# Patient Record
Sex: MALE | Race: WHITE | NOT HISPANIC OR LATINO | Employment: OTHER | URBAN - NONMETROPOLITAN AREA
[De-identification: names, ages, dates, MRNs, and addresses within clinical notes are randomized per-mention and may not be internally consistent; named-entity substitution may affect disease eponyms.]

---

## 2017-01-03 ENCOUNTER — HOSPITAL ENCOUNTER (OUTPATIENT)
Dept: OTHER | Facility: HOSPITAL | Age: 67
Discharge: HOME | End: 2017-01-03
Attending: INTERNAL MEDICINE | Admitting: INTERNAL MEDICINE

## 2017-01-09 ENCOUNTER — OFFICE VISIT (OUTPATIENT)
Dept: CARDIOLOGY | Facility: CLINIC | Age: 67
End: 2017-01-09

## 2017-01-09 VITALS
SYSTOLIC BLOOD PRESSURE: 138 MMHG | HEIGHT: 75 IN | OXYGEN SATURATION: 97 % | HEART RATE: 64 BPM | DIASTOLIC BLOOD PRESSURE: 68 MMHG | WEIGHT: 267.9 LBS | BODY MASS INDEX: 33.31 KG/M2

## 2017-01-09 DIAGNOSIS — I10 ESSENTIAL HYPERTENSION: Primary | ICD-10-CM

## 2017-01-09 DIAGNOSIS — I25.10 CORONARY ARTERY DISEASE INVOLVING NATIVE CORONARY ARTERY OF NATIVE HEART WITHOUT ANGINA PECTORIS: ICD-10-CM

## 2017-01-09 PROCEDURE — 99213 OFFICE O/P EST LOW 20 MIN: CPT | Performed by: INTERNAL MEDICINE

## 2017-01-09 RX ORDER — CHLORAL HYDRATE 500 MG
1200 CAPSULE ORAL
COMMUNITY

## 2017-01-09 RX ORDER — CHOLECALCIFEROL (VITAMIN D3) 10 MCG
1 TABLET ORAL DAILY
COMMUNITY

## 2017-01-09 NOTE — MR AVS SNAPSHOT
Robinson Quiroz   1/9/2017 12:45 PM   Office Visit    Dept Phone:  [phone]   Encounter #:  [id number]    Provider:  Arnaud Akbar MD   Department:  [facility] CARDIOLOGY                Your Full Care Plan              Your Updated Medication List          This list is accurate as of: 1/9/17  1:30 PM.  Always use your most recent med list.                albuterol 108 (90 BASE) MCG/ACT inhaler   Commonly known as:  PROVENTIL HFA;VENTOLIN HFA   Inhale 2 puffs every 4 (four) hours as needed (cough).       aspirin 81 MG chewable tablet       atorvastatin 80 MG tablet   Commonly known as:  LIPITOR       b complex-C-folic acid 1 MG capsule       clotrimazole-betamethasone 1-0.05 % cream   Commonly known as:  LOTRISONE       fish oil 1000 MG capsule capsule       gabapentin 300 MG capsule   Commonly known as:  NEURONTIN       Lorcaserin HCl 10 MG tablet   Take 10 mg by mouth 2 (Two) Times a Day.       metoprolol succinate XL 50 MG 24 hr tablet   Commonly known as:  TOPROL-XL   TAKE 1 TABLET BY MOUTH EVERY DAY FOR HEART/BLOOD PRESSURE       MULTIVITAMIN ADULTS 50+ PO       pregabalin 50 MG capsule   Commonly known as:  LYRICA       tamsulosin 0.4 MG capsule 24 hr capsule   Commonly known as:  FLOMAX   TAKE ONE CAPSULE BY MOUTH EVERY DAY FOR PROSTATE       triamcinolone 0.1 % cream   Commonly known as:  KENALOG       valsartan-hydrochlorothiazide 80-12.5 MG per tablet   Commonly known as:  DIOVAN-HCT   TAKE 1 TABLET EVERY DAY ORALLY FOR BLOOD PRESSURE               You Were Diagnosed With        Codes Comments    Essential hypertension    -  Primary ICD-10-CM: I10  ICD-9-CM: 401.9     Coronary artery disease involving native coronary artery of native heart without angina pectoris     ICD-10-CM: I25.10  ICD-9-CM: 414.01       Instructions     None    Patient Instructions History      Upcoming Appointments     Visit Type Date Time Department    OFFICE VISIT 1/9/2017 12:45 PM TAMMY  "CARDIOLOGY [facility]    FOLLOW UP 2017  3:00 PM [facility] CARDIOLOGY [facility]    OFFICE VISIT 7/10/2017  2:15 PM [facility] CARDIOLOGY [facility]      MyChart Signup     [facility] [employer] allows you to send messages to your doctor, view your test results, renew your prescriptions, schedule appointments, and more. To sign up, go to [employer] and click on the Sign Up Now link in the New User? box. Enter your [employer] Activation Code exactly as it appears below along with the last four digits of your Social Security Number and your Date of Birth () to complete the sign-up process. If you do not sign up before the expiration date, you must request a new code.    [employer] Activation Code: [email or web address]  Expires: 2017  1:29 PM    If you have questions, you can email [email or web address] or call [phone] to talk to our [employer] staff. Remember, [employer] is NOT to be used for urgent needs. For medical emergencies, dial 911.               Other Info from Your Visit           Your Appointments     2017  3:00 PM CDT   Follow Up with Arnaud Akbar MD   [facility] CARDIOLOGY (--)    [address] [place] [address] [address]  [facility] [address] [place] [id number] [phone]           Arrive 15 minutes prior to appointment.            Jul 10, 2017  2:15 PM CDT   Office Visit with Arnaud Akbar MD   [facility] CARDIOLOGY (--)    [address] [place] [address] [address]  [facility] [address] [place] [id number] [phone]           Arrive 15 minutes prior to appointment.              Allergies     Penicillins  Anaphylaxis      Reason for Visit     Results     Chest Pain           Vital Signs     Blood Pressure Pulse Height Weight Oxygen Saturation Body Mass Index    138/68 (BP Location: Left arm, Patient Position: Sitting, Cuff Size: Large Adult) 64 75\" (190.5 cm) 267 lb 14.4 oz (122 kg) 97% 33.49 kg/m2    Smoking Status                   Former Smoker           Problems and Diagnoses Noted     " Coronary artery disease involving native coronary artery without angina pectoris    High blood pressure

## 2017-01-09 NOTE — PROGRESS NOTES
Chief complaint : Coronary artery disease       History of Present Illness very pleasant 66-year-old gentleman presents today to discuss the result of his nuclear perfusion imaging stress test.  Since his last visit he has not had any symptoms of angina or chest pain.  He has no orthopnea or PND.  He has no palpitations he is planning to join the fitness center with his wife.         Review of Systems   Cardiovascular: Negative for chest pain and dyspnea on exertion.       Past Medical History   Diagnosis Date   • Arthritis    • Coronary artery disease    • History of echocardiogram 09/13/2012     NRL LV systolic function w/ EF 60%. Mild CLVH. NRL diastolic function of LV myocardium. Left atrial enlargement   • HL (hearing loss)    • Hyperlipidemia    • Hypertension    • Kidney stone    • Obesity    • Pneumonia    • Stroke        Family History   Problem Relation Age of Onset   • Cancer Mother    • Diabetes Mother    • Early death Mother    • Hearing loss Mother    • Hyperlipidemia Mother    • Liver disease Mother    • Pancreatic cancer Mother    • Heart disease Father    • Stroke Father    • Hearing loss Daughter    • Arthritis Maternal Aunt    • Arthritis Maternal Uncle    • Alcohol abuse Paternal Uncle    • Arthritis Maternal Grandmother    • Arthritis Maternal Grandfather        Penicillins     reports that he quit smoking about 13 years ago. His smoking use included Cigarettes. He has never used smokeless tobacco. He reports that he does not drink alcohol or use illicit drugs.    Objective     Vital Signs  Heart Rate:  [64] 64  BP: (138)/(68) 138/68    Physical Exam   Constitutional: He is oriented to person, place, and time. He appears well-developed and well-nourished.   HENT:   Head: Normocephalic and atraumatic.   Eyes: Conjunctivae and EOM are normal. Pupils are equal, round, and reactive to light.   Neck: Neck supple. No JVD present. Carotid bruit is not present. No tracheal deviation and no edema  present.   Cardiovascular: Normal rate, regular rhythm, S1 normal, S2 normal, normal heart sounds and intact distal pulses.  Exam reveals no gallop, no S3, no S4 and no friction rub.    No murmur heard.  Pulmonary/Chest: Effort normal and breath sounds normal. He has no wheezes. He has no rales. He exhibits no tenderness.   Abdominal: Bowel sounds are normal. He exhibits no abdominal bruit and no pulsatile midline mass. There is no rebound and no guarding.   Musculoskeletal: Normal range of motion. He exhibits no edema.   Neurological: He is alert and oriented to person, place, and time.   Skin: Skin is warm and dry.   Psychiatric: He has a normal mood and affect.       Procedures    Assessment/Plan     Patient Active Problem List   Diagnosis   • Essential hypertension   • Coronary artery disease involving native coronary artery without angina pectoris   • Chronic right shoulder pain   • Rotator cuff strain   • Coronary artery disease involving native heart   • Chest pain   • Hyperlipidemia     1. Essential hypertension  Patient's blood pressure is well-controlled.  Plan is to continue with current medications.    2. Coronary artery disease involving native coronary artery of native heart without angina pectoris  His nuclear perfusion imaging stress test reveals chronic changes.  I have advised patient to continue with current medications and risk factor modification.  If he continues to have symptoms however we will consider diagnostic coronary angiogram.      I discussed the patients findings and my recommendations with patient.    Arnaud Akbar MD  01/09/17  1:25 PM    EMR Dragon/Transcription disclaimer:   Much of this encounter note is an electronic transcription/translation of spoken language to printed text. The electronic translation of spoken language may permit erroneous, or at times, nonsensical words or phrases to be inadvertently transcribed; Although I have reviewed the note for such errors, some may  still exist.

## 2017-01-17 RX ORDER — TAMSULOSIN HYDROCHLORIDE 0.4 MG/1
0.4 CAPSULE ORAL DAILY
Qty: 90 CAPSULE | Refills: 0 | Status: SHIPPED | OUTPATIENT
Start: 2017-01-17 | End: 2017-04-18 | Stop reason: SDUPTHER

## 2017-01-17 RX ORDER — PREGABALIN 50 MG/1
50 CAPSULE ORAL DAILY
Qty: 90 CAPSULE | Refills: 0 | Status: SHIPPED | OUTPATIENT
Start: 2017-01-17 | End: 2017-04-15 | Stop reason: SDUPTHER

## 2017-01-17 RX ORDER — METOPROLOL SUCCINATE 50 MG/1
50 TABLET, EXTENDED RELEASE ORAL DAILY
Qty: 90 TABLET | Refills: 0 | Status: SHIPPED | OUTPATIENT
Start: 2017-01-17 | End: 2017-07-17 | Stop reason: SDUPTHER

## 2017-01-17 RX ORDER — ATORVASTATIN CALCIUM 80 MG/1
80 TABLET, FILM COATED ORAL DAILY
Qty: 90 TABLET | Refills: 0 | Status: SHIPPED | OUTPATIENT
Start: 2017-01-17 | End: 2017-07-17 | Stop reason: SDUPTHER

## 2017-03-13 RX ORDER — VALSARTAN AND HYDROCHLOROTHIAZIDE 80; 12.5 MG/1; MG/1
TABLET, FILM COATED ORAL
Qty: 90 TABLET | Refills: 0 | Status: SHIPPED | OUTPATIENT
Start: 2017-03-13 | End: 2017-06-08 | Stop reason: SDUPTHER

## 2017-04-12 ENCOUNTER — OFFICE VISIT (OUTPATIENT)
Dept: CARDIOLOGY | Facility: CLINIC | Age: 67
End: 2017-04-12

## 2017-04-12 VITALS
DIASTOLIC BLOOD PRESSURE: 67 MMHG | SYSTOLIC BLOOD PRESSURE: 134 MMHG | HEIGHT: 75 IN | HEART RATE: 60 BPM | OXYGEN SATURATION: 97 % | WEIGHT: 272 LBS | BODY MASS INDEX: 33.82 KG/M2

## 2017-04-12 DIAGNOSIS — I10 ESSENTIAL HYPERTENSION: ICD-10-CM

## 2017-04-12 DIAGNOSIS — E78.5 HYPERLIPIDEMIA, UNSPECIFIED HYPERLIPIDEMIA TYPE: Primary | ICD-10-CM

## 2017-04-12 DIAGNOSIS — I25.10 CORONARY ARTERY DISEASE INVOLVING NATIVE CORONARY ARTERY OF NATIVE HEART WITHOUT ANGINA PECTORIS: ICD-10-CM

## 2017-04-12 PROCEDURE — 99214 OFFICE O/P EST MOD 30 MIN: CPT | Performed by: INTERNAL MEDICINE

## 2017-04-13 NOTE — PROGRESS NOTES
Chief complaint : Coronary artery disease    History of Present Illness very pleasant 67-year-old gentleman who comes for follow-up visit.  He is doing very well.  He has started to exercise on a regular basis.  He has no chest pain or chest discomfort.  He denies shortness of breath orthopnea or PND.  Denies palpitations or dizziness.    Subjective      Review of Systems   Constitution: Negative. Negative for decreased appetite, diaphoresis, weakness, night sweats, weight gain and weight loss.   HENT: Negative for headaches, hearing loss, nosebleeds and sore throat.    Eyes: Negative.  Negative for blurred vision and photophobia.   Cardiovascular: Negative for chest pain, claudication, dyspnea on exertion, irregular heartbeat, leg swelling, palpitations, paroxysmal nocturnal dyspnea and syncope.   Respiratory: Negative for cough, hemoptysis, shortness of breath and wheezing.    Endocrine: Negative for cold intolerance, heat intolerance, polydipsia, polyphagia and polyuria.   Hematologic/Lymphatic: Negative.    Skin: Negative for color change, dry skin, flushing, itching and rash.   Musculoskeletal: Negative.  Negative for muscle cramps, muscle weakness and myalgias.   Gastrointestinal: Negative for abdominal pain, change in bowel habit, diarrhea, hematemesis, melena, nausea and vomiting.   Genitourinary: Negative for dysuria, frequency and hematuria.   Neurological: Negative for dizziness, focal weakness, light-headedness, loss of balance, numbness and seizures.   Psychiatric/Behavioral: Negative.  Negative for substance abuse, suicidal ideas and thoughts of violence.   Allergic/Immunologic: Negative.        Past Medical History:   Diagnosis Date   • Arthritis    • Coronary artery disease    • History of echocardiogram 09/13/2012    NRL LV systolic function w/ EF 60%. Mild CLVH. NRL diastolic function of LV myocardium. Left atrial enlargement   • HL (hearing loss)    • Hyperlipidemia    • Hypertension    • Kidney  stone    • Obesity    • Pneumonia    • Stroke        Family History   Problem Relation Age of Onset   • Cancer Mother    • Diabetes Mother    • Early death Mother    • Hearing loss Mother    • Hyperlipidemia Mother    • Liver disease Mother    • Pancreatic cancer Mother    • Heart disease Father    • Stroke Father    • Hearing loss Daughter    • Arthritis Maternal Aunt    • Arthritis Maternal Uncle    • Alcohol abuse Paternal Uncle    • Arthritis Maternal Grandmother    • Arthritis Maternal Grandfather        Suzanne     reports that he quit smoking about 13 years ago. His smoking use included Cigarettes. He has never used smokeless tobacco. He reports that he does not drink alcohol or use illicit drugs.    Objective     Vital Signs  Heart Rate:  [60] 60  BP: (134)/(67) 134/67    Physical Exam   Constitutional: He is oriented to person, place, and time. He appears well-developed and well-nourished.   HENT:   Head: Normocephalic and atraumatic.   Eyes: Conjunctivae and EOM are normal. Pupils are equal, round, and reactive to light.   Neck: Neck supple. No JVD present. Carotid bruit is not present. No tracheal deviation and no edema present.   Cardiovascular: Normal rate, regular rhythm, S1 normal, S2 normal, normal heart sounds and intact distal pulses.  Exam reveals no gallop, no S3, no S4 and no friction rub.    No murmur heard.  Pulmonary/Chest: Effort normal and breath sounds normal. He has no wheezes. He has no rales. He exhibits no tenderness.   Abdominal: Bowel sounds are normal. He exhibits no abdominal bruit and no pulsatile midline mass. There is no rebound and no guarding.   Musculoskeletal: Normal range of motion. He exhibits no edema.   Neurological: He is alert and oriented to person, place, and time.   Skin: Skin is warm and dry.   Psychiatric: He has a normal mood and affect.       Procedures    Assessment/Plan     Patient Active Problem List   Diagnosis   • Essential hypertension   • Coronary  artery disease involving native coronary artery without angina pectoris   • Chronic right shoulder pain   • Rotator cuff strain   • Coronary artery disease involving native heart   • Chest pain   • Hyperlipidemia     1. Hyperlipidemia, unspecified hyperlipidemia type:    Component      Latest Ref Rng & Units 2/3/2014 8/25/2014 3/9/2015 1/29/2016   HDL-C      60 - 200 mg/dl 37 (L) 37 (L) 29 (L) 36 (L)   LP(a) Cholesterol      0 - 10 mg/dl 3 0 0 0   LDLCALC ELECT      0 - 129 mg/dl 47 59 59 56   Chylomicron      Not Present Not Present Not Present Not Present Not Present   LDL/HDL Ratio      0.00 - 3.55 1.27 1.59 2.03 1.56   Total Cholesterol      0 - 199 mg/dl 116 121 115 123   Triglycerides      20 - 199 mg/dl 205 (H) 144 155 159   Plan:  · We will continue with high intensity statin therapy.  · We'll monitor his lipid panel and liver function test.  · Continue with lifestyle modification.  - Comprehensive Metabolic Panel; Future  - Lipid Panel; Future    2. Coronary artery disease involving native coronary artery of native heart without angina pectoris    September 2004 patient underwent coronary artery bypass graft surgery  · LIMA to the LAD  · SVG to diagonal branch  · SVG to OM  · SVG to PDA  October 21, 2013 patient underwent cardiac catheterization and stent placement.  2.5 x 33 mm xience expedition stent was deployed to the left circumflex coronary artery.  Subsequently nuclear perfusion imaging stress test January 3, 2017 revealed no evidence of reversible perfusion defect.  Plan:  · Continue with risk factor modification  · Continue with guideline direct medical therapy    3. Essential hypertension  Patient's blood pressure is well-controlled.  He has started to go to the fitness center on a regular basis with his spouse and is exercising on a regular basis.  Plan:  · Continue with current medications  · Continue with risk factor and lifestyle modification.  · Continue with low sodium diet.      I discussed  the patients findings and my recommendations with patient.    Arnaud Akbar MD  04/12/17  7:38 PM    EMR Dragon/Transcription disclaimer:   Much of this encounter note is an electronic transcription/translation of spoken language to printed text. The electronic translation of spoken language may permit erroneous, or at times, nonsensical words or phrases to be inadvertently transcribed; Although I have reviewed the note for such errors, some may still exist.

## 2017-04-18 RX ORDER — TAMSULOSIN HYDROCHLORIDE 0.4 MG/1
0.4 CAPSULE ORAL DAILY
Qty: 30 CAPSULE | Refills: 0 | Status: SHIPPED | OUTPATIENT
Start: 2017-04-18 | End: 2017-05-15 | Stop reason: SDUPTHER

## 2017-05-04 ENCOUNTER — OFFICE VISIT (OUTPATIENT)
Dept: INTERNAL MEDICINE | Facility: CLINIC | Age: 67
End: 2017-05-04

## 2017-05-04 ENCOUNTER — APPOINTMENT (OUTPATIENT)
Dept: LAB | Facility: HOSPITAL | Age: 67
End: 2017-05-04

## 2017-05-04 VITALS
WEIGHT: 271.8 LBS | HEIGHT: 75 IN | SYSTOLIC BLOOD PRESSURE: 140 MMHG | BODY MASS INDEX: 33.8 KG/M2 | DIASTOLIC BLOOD PRESSURE: 80 MMHG

## 2017-05-04 DIAGNOSIS — I10 ESSENTIAL HYPERTENSION: Primary | ICD-10-CM

## 2017-05-04 DIAGNOSIS — Z13.6 ENCOUNTER FOR ABDOMINAL AORTIC ANEURYSM SCREENING: ICD-10-CM

## 2017-05-04 DIAGNOSIS — G62.9 PERIPHERAL POLYNEUROPATHY: ICD-10-CM

## 2017-05-04 DIAGNOSIS — E78.2 MIXED HYPERLIPIDEMIA: ICD-10-CM

## 2017-05-04 LAB
ALBUMIN SERPL-MCNC: 4.7 G/DL (ref 3.4–4.8)
ALBUMIN/GLOB SERPL: 1.6 G/DL (ref 1.1–1.8)
ALP SERPL-CCNC: 93 U/L (ref 38–126)
ALT SERPL W P-5'-P-CCNC: 65 U/L (ref 21–72)
ANION GAP SERPL CALCULATED.3IONS-SCNC: 13 MMOL/L (ref 5–15)
ARTICHOKE IGE QN: 45 MG/DL (ref 1–129)
AST SERPL-CCNC: 71 U/L (ref 17–59)
BASOPHILS # BLD AUTO: 0.05 10*3/MM3 (ref 0–0.2)
BASOPHILS NFR BLD AUTO: 0.8 % (ref 0–2)
BILIRUB SERPL-MCNC: 0.8 MG/DL (ref 0.2–1.3)
BUN BLD-MCNC: 13 MG/DL (ref 7–21)
BUN/CREAT SERPL: 16 (ref 7–25)
CALCIUM SPEC-SCNC: 9.4 MG/DL (ref 8.4–10.2)
CHLORIDE SERPL-SCNC: 101 MMOL/L (ref 95–110)
CHOLEST SERPL-MCNC: 118 MG/DL (ref 0–199)
CO2 SERPL-SCNC: 26 MMOL/L (ref 22–31)
CREAT BLD-MCNC: 0.81 MG/DL (ref 0.7–1.3)
DEPRECATED RDW RBC AUTO: 40.3 FL (ref 35.1–43.9)
EOSINOPHIL # BLD AUTO: 0.32 10*3/MM3 (ref 0–0.7)
EOSINOPHIL NFR BLD AUTO: 5.4 % (ref 0–7)
ERYTHROCYTE [DISTWIDTH] IN BLOOD BY AUTOMATED COUNT: 12.7 % (ref 11.5–14.5)
GFR SERPL CREATININE-BSD FRML MDRD: 95 ML/MIN/1.73 (ref 49–113)
GLOBULIN UR ELPH-MCNC: 3 GM/DL (ref 2.3–3.5)
GLUCOSE BLD-MCNC: 97 MG/DL (ref 60–100)
HCT VFR BLD AUTO: 44.4 % (ref 39–49)
HDLC SERPL-MCNC: 42 MG/DL (ref 60–200)
HGB BLD-MCNC: 15.3 G/DL (ref 13.7–17.3)
IMM GRANULOCYTES # BLD: 0.02 10*3/MM3 (ref 0–0.02)
IMM GRANULOCYTES NFR BLD: 0.3 % (ref 0–0.5)
LDLC/HDLC SERPL: 1.01 {RATIO} (ref 0–3.55)
LYMPHOCYTES # BLD AUTO: 1.81 10*3/MM3 (ref 0.6–4.2)
LYMPHOCYTES NFR BLD AUTO: 30.3 % (ref 10–50)
MCH RBC QN AUTO: 30.6 PG (ref 26.5–34)
MCHC RBC AUTO-ENTMCNC: 34.5 G/DL (ref 31.5–36.3)
MCV RBC AUTO: 88.8 FL (ref 80–98)
MONOCYTES # BLD AUTO: 0.82 10*3/MM3 (ref 0–0.9)
MONOCYTES NFR BLD AUTO: 13.7 % (ref 0–12)
NEUTROPHILS # BLD AUTO: 2.96 10*3/MM3 (ref 2–8.6)
NEUTROPHILS NFR BLD AUTO: 49.5 % (ref 37–80)
PLATELET # BLD AUTO: 139 10*3/MM3 (ref 150–450)
PMV BLD AUTO: 10.3 FL (ref 8–12)
POTASSIUM BLD-SCNC: 3.9 MMOL/L (ref 3.5–5.1)
PROT SERPL-MCNC: 7.7 G/DL (ref 6.3–8.6)
RBC # BLD AUTO: 5 10*6/MM3 (ref 4.37–5.74)
SODIUM BLD-SCNC: 140 MMOL/L (ref 137–145)
TRIGL SERPL-MCNC: 167 MG/DL (ref 20–199)
TSH SERPL DL<=0.05 MIU/L-ACNC: 2.19 MIU/ML (ref 0.46–4.68)
VIT B12 BLD-MCNC: 640 PG/ML (ref 239–931)
WBC NRBC COR # BLD: 5.98 10*3/MM3 (ref 3.2–9.8)

## 2017-05-04 PROCEDURE — 82607 VITAMIN B-12: CPT | Performed by: INTERNAL MEDICINE

## 2017-05-04 PROCEDURE — 36415 COLL VENOUS BLD VENIPUNCTURE: CPT | Performed by: INTERNAL MEDICINE

## 2017-05-04 PROCEDURE — 80053 COMPREHEN METABOLIC PANEL: CPT | Performed by: INTERNAL MEDICINE

## 2017-05-04 PROCEDURE — 84443 ASSAY THYROID STIM HORMONE: CPT | Performed by: INTERNAL MEDICINE

## 2017-05-04 PROCEDURE — 99213 OFFICE O/P EST LOW 20 MIN: CPT | Performed by: INTERNAL MEDICINE

## 2017-05-04 PROCEDURE — 85025 COMPLETE CBC W/AUTO DIFF WBC: CPT | Performed by: INTERNAL MEDICINE

## 2017-05-04 PROCEDURE — 80061 LIPID PANEL: CPT | Performed by: INTERNAL MEDICINE

## 2017-05-04 RX ORDER — PREGABALIN 50 MG/1
50 CAPSULE ORAL 2 TIMES DAILY
Qty: 60 CAPSULE | Refills: 2 | Status: SHIPPED | OUTPATIENT
Start: 2017-05-04 | End: 2017-08-14 | Stop reason: SDUPTHER

## 2017-05-05 ENCOUNTER — TELEPHONE (OUTPATIENT)
Dept: INTERNAL MEDICINE | Facility: CLINIC | Age: 67
End: 2017-05-05

## 2017-05-10 ENCOUNTER — HOSPITAL ENCOUNTER (OUTPATIENT)
Dept: ULTRASOUND IMAGING | Facility: HOSPITAL | Age: 67
Discharge: HOME | End: 2017-05-10
Attending: INTERNAL MEDICINE | Admitting: INTERNAL MEDICINE

## 2017-05-10 PROCEDURE — 76706 US ABDL AORTA SCREEN AAA: CPT

## 2017-05-11 ENCOUNTER — TELEPHONE (OUTPATIENT)
Dept: INTERNAL MEDICINE | Facility: CLINIC | Age: 67
End: 2017-05-11

## 2017-05-15 RX ORDER — TAMSULOSIN HYDROCHLORIDE 0.4 MG/1
CAPSULE ORAL
Qty: 90 CAPSULE | Refills: 1 | Status: SHIPPED | OUTPATIENT
Start: 2017-05-15 | End: 2017-11-06 | Stop reason: SDUPTHER

## 2017-06-08 RX ORDER — VALSARTAN AND HYDROCHLOROTHIAZIDE 80; 12.5 MG/1; MG/1
TABLET, FILM COATED ORAL
Qty: 90 TABLET | Refills: 1 | Status: SHIPPED | OUTPATIENT
Start: 2017-06-08 | End: 2017-11-06 | Stop reason: SDUPTHER

## 2017-07-17 ENCOUNTER — TELEPHONE (OUTPATIENT)
Dept: INTERNAL MEDICINE | Facility: CLINIC | Age: 67
End: 2017-07-17

## 2017-07-17 RX ORDER — ATORVASTATIN CALCIUM 80 MG/1
80 TABLET, FILM COATED ORAL DAILY
Qty: 90 TABLET | Refills: 0 | Status: SHIPPED | OUTPATIENT
Start: 2017-07-17 | End: 2017-10-16 | Stop reason: SDUPTHER

## 2017-07-17 RX ORDER — METOPROLOL SUCCINATE 50 MG/1
50 TABLET, EXTENDED RELEASE ORAL DAILY
Qty: 90 TABLET | Refills: 0 | Status: SHIPPED | OUTPATIENT
Start: 2017-07-17 | End: 2017-10-16 | Stop reason: SDUPTHER

## 2017-08-14 ENCOUNTER — TELEPHONE (OUTPATIENT)
Dept: INTERNAL MEDICINE | Facility: CLINIC | Age: 67
End: 2017-08-14

## 2017-08-14 RX ORDER — PREGABALIN 50 MG/1
50 CAPSULE ORAL 2 TIMES DAILY
Qty: 180 CAPSULE | Refills: 0 | Status: SHIPPED | OUTPATIENT
Start: 2017-08-14 | End: 2017-11-06 | Stop reason: SDUPTHER

## 2017-09-18 ENCOUNTER — TELEPHONE (OUTPATIENT)
Dept: INTERNAL MEDICINE | Facility: CLINIC | Age: 67
End: 2017-09-18

## 2017-10-16 ENCOUNTER — TELEPHONE (OUTPATIENT)
Dept: INTERNAL MEDICINE | Facility: CLINIC | Age: 67
End: 2017-10-16

## 2017-10-16 RX ORDER — METOPROLOL SUCCINATE 50 MG/1
50 TABLET, EXTENDED RELEASE ORAL DAILY
Qty: 90 TABLET | Refills: 2 | Status: SHIPPED | OUTPATIENT
Start: 2017-10-16 | End: 2018-07-10 | Stop reason: SDUPTHER

## 2017-10-16 RX ORDER — ATORVASTATIN CALCIUM 80 MG/1
80 TABLET, FILM COATED ORAL DAILY
Qty: 90 TABLET | Refills: 2 | Status: SHIPPED | OUTPATIENT
Start: 2017-10-16 | End: 2018-07-10 | Stop reason: SDUPTHER

## 2017-10-27 ENCOUNTER — CLINICAL SUPPORT (OUTPATIENT)
Dept: INTERNAL MEDICINE | Facility: CLINIC | Age: 67
End: 2017-10-27

## 2017-10-27 DIAGNOSIS — Z23 IMMUNIZATION DUE: Primary | ICD-10-CM

## 2017-10-27 PROCEDURE — 90686 IIV4 VACC NO PRSV 0.5 ML IM: CPT | Performed by: INTERNAL MEDICINE

## 2017-10-27 PROCEDURE — G0008 ADMIN INFLUENZA VIRUS VAC: HCPCS | Performed by: INTERNAL MEDICINE

## 2017-11-06 ENCOUNTER — LAB (OUTPATIENT)
Dept: LAB | Facility: HOSPITAL | Age: 67
End: 2017-11-06

## 2017-11-06 ENCOUNTER — OFFICE VISIT (OUTPATIENT)
Dept: INTERNAL MEDICINE | Facility: CLINIC | Age: 67
End: 2017-11-06

## 2017-11-06 VITALS
DIASTOLIC BLOOD PRESSURE: 60 MMHG | BODY MASS INDEX: 34.02 KG/M2 | HEIGHT: 75 IN | SYSTOLIC BLOOD PRESSURE: 120 MMHG | WEIGHT: 273.6 LBS

## 2017-11-06 DIAGNOSIS — Z23 IMMUNIZATION DUE: ICD-10-CM

## 2017-11-06 DIAGNOSIS — E78.2 MIXED HYPERLIPIDEMIA: ICD-10-CM

## 2017-11-06 DIAGNOSIS — G62.9 PERIPHERAL POLYNEUROPATHY: ICD-10-CM

## 2017-11-06 DIAGNOSIS — E66.9 OBESITY (BMI 30.0-34.9): ICD-10-CM

## 2017-11-06 DIAGNOSIS — N40.0 BENIGN PROSTATIC HYPERPLASIA WITHOUT LOWER URINARY TRACT SYMPTOMS: ICD-10-CM

## 2017-11-06 DIAGNOSIS — I10 ESSENTIAL HYPERTENSION: Primary | ICD-10-CM

## 2017-11-06 DIAGNOSIS — E78.5 HYPERLIPIDEMIA, UNSPECIFIED HYPERLIPIDEMIA TYPE: ICD-10-CM

## 2017-11-06 LAB
ALBUMIN SERPL-MCNC: 4.5 G/DL (ref 3.4–4.8)
ALBUMIN/GLOB SERPL: 1.5 G/DL (ref 1.1–1.8)
ALP SERPL-CCNC: 80 U/L (ref 38–126)
ALT SERPL W P-5'-P-CCNC: 84 U/L (ref 21–72)
ANION GAP SERPL CALCULATED.3IONS-SCNC: 13 MMOL/L (ref 5–15)
ARTICHOKE IGE QN: 45 MG/DL (ref 1–129)
AST SERPL-CCNC: 74 U/L (ref 17–59)
BILIRUB SERPL-MCNC: 0.8 MG/DL (ref 0.2–1.3)
BUN BLD-MCNC: 10 MG/DL (ref 7–21)
BUN/CREAT SERPL: 11.5 (ref 7–25)
CALCIUM SPEC-SCNC: 9.7 MG/DL (ref 8.4–10.2)
CHLORIDE SERPL-SCNC: 100 MMOL/L (ref 95–110)
CHOLEST SERPL-MCNC: 122 MG/DL (ref 0–199)
CO2 SERPL-SCNC: 28 MMOL/L (ref 22–31)
CREAT BLD-MCNC: 0.87 MG/DL (ref 0.7–1.3)
GFR SERPL CREATININE-BSD FRML MDRD: 88 ML/MIN/1.73 (ref 49–113)
GLOBULIN UR ELPH-MCNC: 3.1 GM/DL (ref 2.3–3.5)
GLUCOSE BLD-MCNC: 94 MG/DL (ref 60–100)
HDLC SERPL-MCNC: 40 MG/DL (ref 60–200)
LDLC/HDLC SERPL: 1.15 {RATIO} (ref 0–3.55)
POTASSIUM BLD-SCNC: 4 MMOL/L (ref 3.5–5.1)
PROT SERPL-MCNC: 7.6 G/DL (ref 6.3–8.6)
PSA SERPL-MCNC: 0.62 NG/ML (ref 0–4)
SODIUM BLD-SCNC: 141 MMOL/L (ref 137–145)
TRIGL SERPL-MCNC: 181 MG/DL (ref 20–199)

## 2017-11-06 PROCEDURE — 36415 COLL VENOUS BLD VENIPUNCTURE: CPT | Performed by: INTERNAL MEDICINE

## 2017-11-06 PROCEDURE — 80061 LIPID PANEL: CPT | Performed by: INTERNAL MEDICINE

## 2017-11-06 PROCEDURE — 99214 OFFICE O/P EST MOD 30 MIN: CPT | Performed by: INTERNAL MEDICINE

## 2017-11-06 PROCEDURE — 84153 ASSAY OF PSA TOTAL: CPT | Performed by: INTERNAL MEDICINE

## 2017-11-06 PROCEDURE — 80053 COMPREHEN METABOLIC PANEL: CPT | Performed by: INTERNAL MEDICINE

## 2017-11-06 PROCEDURE — 90732 PPSV23 VACC 2 YRS+ SUBQ/IM: CPT | Performed by: INTERNAL MEDICINE

## 2017-11-06 PROCEDURE — G0009 ADMIN PNEUMOCOCCAL VACCINE: HCPCS | Performed by: INTERNAL MEDICINE

## 2017-11-06 RX ORDER — VALSARTAN AND HYDROCHLOROTHIAZIDE 80; 12.5 MG/1; MG/1
1 TABLET, FILM COATED ORAL DAILY
Qty: 90 TABLET | Refills: 1 | Status: SHIPPED | OUTPATIENT
Start: 2017-11-06 | End: 2017-12-26 | Stop reason: SDUPTHER

## 2017-11-06 RX ORDER — PREGABALIN 50 MG/1
50 CAPSULE ORAL 2 TIMES DAILY
Qty: 180 CAPSULE | Refills: 1 | Status: SHIPPED | OUTPATIENT
Start: 2017-11-06 | End: 2018-05-31 | Stop reason: SDUPTHER

## 2017-11-06 RX ORDER — TAMSULOSIN HYDROCHLORIDE 0.4 MG/1
1 CAPSULE ORAL DAILY
Qty: 90 CAPSULE | Refills: 1 | Status: SHIPPED | OUTPATIENT
Start: 2017-11-06 | End: 2018-05-15 | Stop reason: SDUPTHER

## 2017-11-06 NOTE — PROGRESS NOTES
Subjective   Robinson Quiroz is a 67 y.o. male       Patient is in for recheck on HTN, CAD, hyperlipidemia and peripheral neuropathy.    His BP is well controlled on current medications.He denies any chest pain, dyspnea, orthopnea or edema in lower extremitiies.  Denies dizziness or lightheadedness.  He continues to exercise 3 times a week.    History of CABG in 2004 and stenting of left circumflex artery in 2013.  Nuclear stress test in January 2017 was negative for ischemia and showed preserved EF of 64%.    He is on Lipitor and tolerates it well.  No myalgia or myositis. No history of elevated LFTs.  He did not lose any weight since last visit. He admits to not watching his diet well.    In terms of BPH, he denies any urologic complaints.No dysuria, frequency or difficulty urinating.    Lyrica is working well for pain and numbness in his legs.He denies any side effects related to the medication.      Past Medical History:   Diagnosis Date   • Arthritis    • Coronary artery disease    • History of echocardiogram 09/13/2012    NRL LV systolic function w/ EF 60%. Mild CLVH. NRL diastolic function of LV myocardium. Left atrial enlargement   • HL (hearing loss)    • Hyperlipidemia    • Hypertension    • Kidney stone    • Obesity    • Pneumonia    • Stroke      Past Surgical History:   Procedure Laterality Date   • CARDIAC SURGERY     • COLONOSCOPY  06/24/2015    Diverticulosis found in the sigmoid colon. Hemorrhoids found in the anus.   • CORONARY ARTERY BYPASS GRAFT     • HERNIA REPAIR         Social History   Substance Use Topics   • Smoking status: Former Smoker     Types: Cigarettes     Quit date: 8/24/2003   • Smokeless tobacco: Never Used      Comment: smoked 1pd or more for less than 20 years   • Alcohol use No     Family History   Problem Relation Age of Onset   • Cancer Mother    • Diabetes Mother    • Early death Mother    • Hearing loss Mother    • Hyperlipidemia Mother    • Liver disease Mother    •  Pancreatic cancer Mother    • Heart disease Father    • Stroke Father    • Hearing loss Daughter    • Arthritis Maternal Aunt    • Arthritis Maternal Uncle    • Alcohol abuse Paternal Uncle    • Arthritis Maternal Grandmother    • Arthritis Maternal Grandfather      Allergies   Allergen Reactions   • Penicillins Anaphylaxis     Current Outpatient Prescriptions on File Prior to Visit   Medication Sig Dispense Refill   • aspirin 81 MG chewable tablet Chew 81 mg daily.     • atorvastatin (LIPITOR) 80 MG tablet Take 1 tablet by mouth Daily. 90 tablet 2   • b complex-C-folic acid 1 MG capsule Take 1 capsule by mouth Daily.     • metoprolol succinate XL (TOPROL-XL) 50 MG 24 hr tablet Take 1 tablet by mouth Daily. 90 tablet 2   • Multiple Vitamins-Minerals (MULTIVITAMIN ADULTS 50+ PO) Take 1 tablet by mouth.     • Omega-3 Fatty Acids (FISH OIL) 1000 MG capsule capsule Take 1,200 mg by mouth 2 (Two) Times a Day With Meals.     • [DISCONTINUED] pregabalin (LYRICA) 50 MG capsule Take 1 capsule by mouth 2 (Two) Times a Day. 180 capsule 0   • [DISCONTINUED] tamsulosin (FLOMAX) 0.4 MG capsule 24 hr capsule TAKE 1 CAPSULE BY MOUTH DAILY. 90 capsule 1   • [DISCONTINUED] valsartan-hydrochlorothiazide (DIOVAN-HCT) 80-12.5 MG per tablet TAKE 1 TABLET BY MOUTH EVERY DAY FOR BLOOD PRESSURE 90 tablet 1     No current facility-administered medications on file prior to visit.      Review of Systems   Constitutional: Negative for activity change and fatigue.   HENT: Negative.    Eyes: Negative.    Respiratory: Negative for chest tightness and shortness of breath.    Cardiovascular: Negative for chest pain, palpitations and leg swelling.   Gastrointestinal: Negative for abdominal pain, constipation and diarrhea.   Endocrine: Negative for cold intolerance, heat intolerance, polydipsia and polyuria.   Musculoskeletal: Negative for back pain and gait problem.   Psychiatric/Behavioral: Negative for sleep disturbance. The patient is not  nervous/anxious.        Objective   Physical Exam   Constitutional: He is oriented to person, place, and time. He appears well-developed and well-nourished.   HENT:   Head: Normocephalic and atraumatic.   Nose: Nose normal.   Mouth/Throat: Oropharynx is clear and moist.   Eyes: Conjunctivae are normal. No scleral icterus.   Neck: Neck supple. No JVD present. No thyromegaly present.   Cardiovascular: Normal rate, regular rhythm and intact distal pulses.    No murmur heard.  Pulmonary/Chest: Effort normal and breath sounds normal.   Abdominal: Soft. Bowel sounds are normal. He exhibits no mass.   Neurological: He is alert and oriented to person, place, and time.   Skin: Skin is dry. No rash noted.   Psychiatric: He has a normal mood and affect. His behavior is normal.   Nursing note and vitals reviewed.          Patient Active Problem List   Diagnosis   • Essential hypertension   • Coronary artery disease involving native coronary artery without angina pectoris   • Chronic right shoulder pain   • Rotator cuff strain   • Coronary artery disease involving native heart   • Chest pain   • Hyperlipidemia   • Peripheral polyneuropathy               Assessment   Diagnosis Plan   1. Essential hypertension  Comprehensive Metabolic Panel   2. Mixed hyperlipidemia  Lipid Panel   3. Obesity (BMI 30.0-34.9)     4. Peripheral polyneuropathy     5. Benign prostatic hyperplasia without lower urinary tract symptoms  PSA   6. Immunization due  Pneumococcal Polysaccharide Vaccine 23-Valent Greater Than or Equal To 3yo Subcutaneous / IM         Plan        1. Patient will continue current antihypertensive therapy.      DASH.      1.5 gr Sodium restriction.  2. Lipitor is continued.      Will recheck lipids and LFTs.  3. Calories restricted diet discussed with the patient and advised on weight loss.      Advised to increase physical activity to 150 minutes weekly.      Will refer to nutritionist.  4. Lyrica is continued.      Advised  about habit forming nature of the medication.      Patient signed [facility] consent for treatment with controlled substance and provider agreement.      William reviewed.  5. Flomax is continued.      PSA ordered.          Follow up in 3-6 months.                This document has been electronically signed by Hillary Bailey MD on November 6, 2017 10:26 AM

## 2017-11-08 ENCOUNTER — TELEPHONE (OUTPATIENT)
Dept: INTERNAL MEDICINE | Facility: CLINIC | Age: 67
End: 2017-11-08

## 2017-11-08 NOTE — TELEPHONE ENCOUNTER
Spoke with pt let him know lab shows cholesterol and glucose was ok but his lft were elevated DR MCLEAN  Said probley due to the lipitor and his weight she recommends he recheck lfts lab in 3 months

## 2017-11-20 ENCOUNTER — TELEPHONE (OUTPATIENT)
Dept: NUTRITION | Facility: HOSPITAL | Age: 67
End: 2017-11-20

## 2017-11-20 NOTE — PROGRESS NOTES
Nutrition Services    Patient Name:  Robinson Quiroz  YOB: 1950  MRN: [id number]  Admit Date:  (Not on file)    Patient was referred by dr. noble for weight management. Pt was unable to reached until today. Pt said his girlfriend has just attended the dm classes at [facility]..  He said he will call me back in 2 weeks to schedule an appointment for a one on one visit. THis RDN will follow then.    Electronically signed by:  Giselle Gordon RD  11/20/17 4:20 PM

## 2017-12-15 ENCOUNTER — OFFICE VISIT (OUTPATIENT)
Dept: CARDIOLOGY | Facility: CLINIC | Age: 67
End: 2017-12-15

## 2017-12-15 VITALS
HEART RATE: 72 BPM | BODY MASS INDEX: 33.94 KG/M2 | SYSTOLIC BLOOD PRESSURE: 118 MMHG | HEIGHT: 75 IN | DIASTOLIC BLOOD PRESSURE: 72 MMHG | WEIGHT: 273 LBS

## 2017-12-15 DIAGNOSIS — I25.10 CORONARY ARTERY DISEASE INVOLVING NATIVE CORONARY ARTERY OF NATIVE HEART WITHOUT ANGINA PECTORIS: Primary | ICD-10-CM

## 2017-12-15 DIAGNOSIS — E78.00 PURE HYPERCHOLESTEROLEMIA: ICD-10-CM

## 2017-12-15 DIAGNOSIS — I10 ESSENTIAL HYPERTENSION: ICD-10-CM

## 2017-12-15 PROCEDURE — 99214 OFFICE O/P EST MOD 30 MIN: CPT | Performed by: INTERNAL MEDICINE

## 2017-12-15 RX ORDER — ASPIRIN 325 MG
325 TABLET ORAL DAILY
Qty: 30 TABLET | Refills: 11 | COMMUNITY
End: 2019-10-25

## 2017-12-15 NOTE — PROGRESS NOTES
[facility]  OFFICE NOTE    Robinson Quiroz  67 y.o. male    12/15/2017  1. Coronary artery disease involving native coronary artery of native heart without angina pectoris    2. Pure hypercholesterolemia    3. Essential hypertension        Chief complaint -Follow-up CAD      History of present Illness-67 year old gentleman with history of coronary disease status post quadruple bypass in 2004, subtotally had stent placement to left circumflex artery in 2013, had a abdominal aortic aneurysm screening last year and was okay.  He had a nuclear stress test in January 2017 which showed no ischemia EF was normal.  He is doing well no chest pain or shortness of breath is on optimal medical therapy with beta blockers and angiotensin receptor blockers and his blood pressure is well controlled.  His BMI is high talked to him about diet and exercise.  Denies any GI symptoms will CNS symptoms.  He had his lipids checked by Dr. Bailey.          Allergies   Allergen Reactions   • Penicillins Anaphylaxis         Past Medical History:   Diagnosis Date   • Arthritis    • Coronary artery disease    • History of echocardiogram 09/13/2012    NRL LV systolic function w/ EF 60%. Mild CLVH. NRL diastolic function of LV myocardium. Left atrial enlargement   • HL (hearing loss)    • Hyperlipidemia    • Hypertension    • Kidney stone    • Obesity    • Pneumonia    • Stroke          Past Surgical History:   Procedure Laterality Date   • CARDIAC SURGERY     • COLONOSCOPY  06/24/2015    Diverticulosis found in the sigmoid colon. Hemorrhoids found in the anus.   • CORONARY ARTERY BYPASS GRAFT     • HERNIA REPAIR           Family History   Problem Relation Age of Onset   • Cancer Mother    • Diabetes Mother    • Early death Mother    • Hearing loss Mother    • Hyperlipidemia Mother    • Liver disease Mother    • Pancreatic cancer Mother    • Heart disease Father    • Stroke Father    • Hearing loss Daughter    •  Arthritis Maternal Aunt    • Arthritis Maternal Uncle    • Alcohol abuse Paternal Uncle    • Arthritis Maternal Grandmother    • Arthritis Maternal Grandfather          Social History     Social History   • Marital status: Single     Spouse name: N/A   • Number of children: N/A   • Years of education: N/A     Occupational History   • Not on file.     Social History Main Topics   • Smoking status: Former Smoker     Types: Cigarettes     Quit date: 8/24/2003   • Smokeless tobacco: Never Used      Comment: smoked 1pd or more for less than 20 years   • Alcohol use No   • Drug use: No   • Sexual activity: Yes     Other Topics Concern   • Not on file     Social History Narrative         Current Outpatient Prescriptions   Medication Sig Dispense Refill   • b complex-C-folic acid 1 MG capsule Take 1 capsule by mouth Daily.     • metoprolol succinate XL (TOPROL-XL) 50 MG 24 hr tablet Take 1 tablet by mouth Daily. 90 tablet 2   • Multiple Vitamins-Minerals (MULTIVITAMIN ADULTS 50+ PO) Take 1 tablet by mouth.     • Omega-3 Fatty Acids (FISH OIL) 1000 MG capsule capsule Take 1,200 mg by mouth 2 (Two) Times a Day With Meals.     • pregabalin (LYRICA) 50 MG capsule Take 1 capsule by mouth 2 (Two) Times a Day. 180 capsule 1   • tamsulosin (FLOMAX) 0.4 MG capsule 24 hr capsule Take 1 capsule by mouth Daily. 90 capsule 1   • valsartan-hydrochlorothiazide (DIOVAN-HCT) 80-12.5 MG per tablet Take 1 tablet by mouth Daily. 90 tablet 1   • aspirin 325 MG tablet Take 1 tablet by mouth Daily. 30 tablet 11   • atorvastatin (LIPITOR) 80 MG tablet Take 1 tablet by mouth Daily. 90 tablet 2     No current facility-administered medications for this visit.          Review of Systems     Constitution: Denies any fatigue, fever or chills    HENT: Denies any headache, hearing impairment,     Eyes: Denies any blurring of vision, or photophobia     Cardivascular - As per history of present illness     Respiratory system-denies any COPD, shortness of  "breath,   sleep apnea.     Endocrine:   history of hyperlipidemia       Musculoskeletal:   history of arthritis with musculoskeletal problems    Gastrointestinal: No nausea, vomiting, or melena    Genitourinary: No dysuria or hematuria    Neurological:   No history of seizure disorder, stroke, memory problems    Psychiatric/Behavioral:        No history of depression,  No history of bipolar disorder or schizophrenia     Hematological- no history of easy bruising or any bleeding diathesis            OBJECTIVE    /72  Pulse 72  Ht 190.5 cm (75\")  Wt 124 kg (273 lb)  BMI 34.12 kg/m2      Physical Exam     Constitutional: is oriented to person, place, and time.     Skin-warm and dry    Well developed and nourished in no acute distress      Head: Normocephalic and atraumatic.     Eyes: Pupils are equal, round, and reactive to light.     Neck: Neck supple. No bruit in the carotids, no elevation of JVD    Cardiovascular: [place] in the fifth intercostal space   Regular rate, and  Rhythm,    S1 greater than S2, no S3 or S4, no gallop     Pulmonary/Chest:   Air  Entry is equal on both sides  No wheezing or crackles,      Abdominal: Soft.  No hepatosplenomegaly, bowel sounds are present    Musculoskeletal: No kyphoscoliosis, no significant thickening of the joints    Neurological: is alert and oriented to person, place, and time.    cranial nerve are intact .   No motor or sensory deficit    Extremities-no edema, no radial femoral delay      Psychiatric: He has a normal mood and affect.                  His behavior is normal.           Procedures      Lab Results   Component Value Date    WBC 5.98 05/04/2017    HGB 15.3 05/04/2017    HCT 44.4 05/04/2017    MCV 88.8 05/04/2017     (L) 05/04/2017     Lab Results   Component Value Date    GLUCOSE 94 11/06/2017    BUN 10 11/06/2017    CREATININE 0.87 11/06/2017    EGFRIFNONA 88 11/06/2017    BCR 11.5 11/06/2017    CO2 28.0 11/06/2017    CALCIUM 9.7 11/06/2017    " ALBUMIN 4.50 11/06/2017    LABIL2 1.5 11/06/2017    AST 74 (H) 11/06/2017    ALT 84 (H) 11/06/2017     Lab Results   Component Value Date    CHOL 122 11/06/2017    CHOL 118 05/04/2017     Lab Results   Component Value Date    TRIG 181 11/06/2017    TRIG 167 05/04/2017    TRIG 159 01/29/2016     Lab Results   Component Value Date    HDL 40 (L) 11/06/2017    HDL 42 (L) 05/04/2017     Lab Results   Component Value Date    LDLCALC 56 01/29/2016    LDLCALC 59 03/09/2015    LDLCALC 59 08/25/2014     No results found for: LDL  No results found for: HDLLDLRATIO  No components found for: CHOLHDL  Lab Results   Component Value Date    HGBA1C 5.6 01/29/2016     Lab Results   Component Value Date    TSH 2.190 05/04/2017                  A/P    CAD status post CABG-history of stent placement to circumflex proximal artery in 2013 asked to take a regular aspirin and he had a stress test in January 2017 which was unremarkable we'll continue medical therapy.    Hypertension continue the Toprol in the evening and Diovan HCTZ in the morning.    Hyperlipidemia on atorvastatin doing okay.  And he was taken a regular aspirin every day.    Follow-up in 1 year            This document has been electronically signed by Kane Maxwell MD on December 15, 2017 11:35 AM       EMR Dragon/Transcription disclaimer:   Some of this note may be an electronic transcription/translation of spoken language to printed text. The electronic translation of spoken language may permit erroneous, or at times, nonsensical words or phrases to be inadvertently transcribed; Although I have reviewed the note for such errors, some may still exist.

## 2017-12-26 ENCOUNTER — TELEPHONE (OUTPATIENT)
Dept: INTERNAL MEDICINE | Facility: CLINIC | Age: 67
End: 2017-12-26

## 2017-12-26 RX ORDER — VALSARTAN AND HYDROCHLOROTHIAZIDE 80; 12.5 MG/1; MG/1
1 TABLET, FILM COATED ORAL DAILY
Qty: 90 TABLET | Refills: 1 | Status: SHIPPED | OUTPATIENT
Start: 2017-12-26 | End: 2018-06-20 | Stop reason: SDUPTHER

## 2018-02-06 ENCOUNTER — OFFICE VISIT (OUTPATIENT)
Dept: SLEEP MEDICINE | Facility: HOSPITAL | Age: 68
End: 2018-02-06

## 2018-02-06 VITALS
SYSTOLIC BLOOD PRESSURE: 130 MMHG | WEIGHT: 268 LBS | HEIGHT: 75 IN | BODY MASS INDEX: 33.32 KG/M2 | DIASTOLIC BLOOD PRESSURE: 68 MMHG

## 2018-02-06 DIAGNOSIS — G47.33 OBSTRUCTIVE SLEEP APNEA, ADULT: Primary | ICD-10-CM

## 2018-02-06 PROCEDURE — 99213 OFFICE O/P EST LOW 20 MIN: CPT | Performed by: INTERNAL MEDICINE

## 2018-02-06 NOTE — PROGRESS NOTES
Sleep Clinic Follow Up    Date: 2/6/2018  Primary Care Physician: Hillary Bailey MD      Interim History (1/3):  Since the last visit on 12/29/2015, patient has:      1)  JAVIER - Has remained compliant with CPAP. He denies mask and machine issues, dry mouth, headaches, pressures intolerance, or non-compliance. He denies abnormal dreams, sleep paralysis, nasal congestion, URI sx.    PAP Data:  Time frame: 02/07/2017 - 02/05/2018   Compliance 94.5%  PAP range : 9 cm H2O  Average 90% pressure: 9 cmH2O  Leak: 0   Average AHI 1.2 events/hr  Mask type: nasal  DME: [facility]    Bed time: 2300  Sleep latency: 5 minutes  Number of times awakens during the night: 0-1  Wake time: 0700  Estimated total sleep time at night: 8 hours  Caffeine intake: 1 water and 1 soda  Alcohol intake: none  Nap time: none  Sleepiness with Driving: none    [place] - 9    2) Patient denies RLS symptoms.     PMHx, FH, SH reviewed and pertinent changes are: unchanged from last office visit on 12/292015      REVIEW OF SYSTEMS:   Negative for chest pain, fever, chills, SOA, abdominal pain. Smoking: none      Exam (6-11/12):    Vitals:    02/06/18 1535   BP: 130/68     HR - 89  RR - 15      Body mass index is 33.5 kg/(m^2). Patient's BMI is above normal parameters. Follow-up plan includes:  exercise counseling and nutrition counseling.      Gen:  No distress, conversant, pleasant, appears stated age, alert, oriented  Eyes:   Anicteric sclera, moist conjunctiva, no lid lag     PERRLA, EOMI   Heent:   NC/AT    Oropharynx clear, Mallampati 4, with partials    normal hearing  Lungs:  Normal effort, non-labored breathing    Clear to auscultation    CV:  Normal S1/S2, no murmur    no lower extremity edema  ABD:  Soft, normal bowel sounds    Psych:  Appropriate affect  Neuro:  CN 2-12 intact    Past Medical History:   Diagnosis Date   • Arthritis    • Coronary artery disease    • History of echocardiogram 09/13/2012    NRL LV systolic function w/ EF 60%. Mild CLVH.  NRL diastolic function of LV myocardium. Left atrial enlargement   • HL (hearing loss)    • Hyperlipidemia    • Hypertension    • Kidney stone    • Obesity    • Pneumonia    • Stroke        Current Outpatient Prescriptions:   •  aspirin 325 MG tablet, Take 1 tablet by mouth Daily., Disp: 30 tablet, Rfl: 11  •  atorvastatin (LIPITOR) 80 MG tablet, Take 1 tablet by mouth Daily., Disp: 90 tablet, Rfl: 2  •  b complex-C-folic acid 1 MG capsule, Take 1 capsule by mouth Daily., Disp: , Rfl:   •  metoprolol succinate XL (TOPROL-XL) 50 MG 24 hr tablet, Take 1 tablet by mouth Daily., Disp: 90 tablet, Rfl: 2  •  Multiple Vitamins-Minerals (MULTIVITAMIN ADULTS 50+ PO), Take 1 tablet by mouth., Disp: , Rfl:   •  Omega-3 Fatty Acids (FISH OIL) 1000 MG capsule capsule, Take 1,200 mg by mouth 2 (Two) Times a Day With Meals., Disp: , Rfl:   •  pregabalin (LYRICA) 50 MG capsule, Take 1 capsule by mouth 2 (Two) Times a Day., Disp: 180 capsule, Rfl: 1  •  tamsulosin (FLOMAX) 0.4 MG capsule 24 hr capsule, Take 1 capsule by mouth Daily., Disp: 90 capsule, Rfl: 1  •  valsartan-hydrochlorothiazide (DIOVAN-HCT) 80-12.5 MG per tablet, Take 1 tablet by mouth Daily., Disp: 90 tablet, Rfl: 1      ASSESSMENT / PLAN:     1. Obstructive sleep apnea  1. PSG on 04/10/2013, AHI of 18  2. CPAP titration on same day, recommended 10 cm H2O  3. Currently on 9 cm H2O  4. Continue PAP as prescribed.   5. Script for PAP supplies  6. Return to clinic in 1 year with compliance check unless sx change in the interim period.      Total time 15 min, more than half spent in face to face counseling and coordination of care.     This document has been electronically signed by Blu Chin MD on February 6, 2018         CC: Hillary Bailey MD          No ref. provider found

## 2018-05-15 ENCOUNTER — TELEPHONE (OUTPATIENT)
Dept: INTERNAL MEDICINE | Facility: CLINIC | Age: 68
End: 2018-05-15

## 2018-05-15 RX ORDER — TAMSULOSIN HYDROCHLORIDE 0.4 MG/1
1 CAPSULE ORAL DAILY
Qty: 90 CAPSULE | Refills: 0 | Status: SHIPPED | OUTPATIENT
Start: 2018-05-15 | End: 2018-08-13 | Stop reason: SDUPTHER

## 2018-05-21 ENCOUNTER — OFFICE VISIT (OUTPATIENT)
Dept: INTERNAL MEDICINE | Facility: CLINIC | Age: 68
End: 2018-05-21

## 2018-05-21 VITALS
WEIGHT: 269 LBS | SYSTOLIC BLOOD PRESSURE: 110 MMHG | BODY MASS INDEX: 33.45 KG/M2 | DIASTOLIC BLOOD PRESSURE: 50 MMHG | HEIGHT: 75 IN

## 2018-05-21 DIAGNOSIS — Z00.00 MEDICARE ANNUAL WELLNESS VISIT, SUBSEQUENT: Primary | ICD-10-CM

## 2018-05-21 DIAGNOSIS — I10 ESSENTIAL HYPERTENSION: ICD-10-CM

## 2018-05-21 DIAGNOSIS — G62.9 PERIPHERAL POLYNEUROPATHY: ICD-10-CM

## 2018-05-21 DIAGNOSIS — I25.10 CORONARY ARTERY DISEASE INVOLVING NATIVE CORONARY ARTERY OF NATIVE HEART WITHOUT ANGINA PECTORIS: ICD-10-CM

## 2018-05-21 PROCEDURE — G0438 PPPS, INITIAL VISIT: HCPCS | Performed by: INTERNAL MEDICINE

## 2018-05-22 PROCEDURE — 93005 ELECTROCARDIOGRAM TRACING: CPT | Performed by: INTERNAL MEDICINE

## 2018-05-22 PROCEDURE — 93010 ELECTROCARDIOGRAM REPORT: CPT | Performed by: INTERNAL MEDICINE

## 2018-05-31 ENCOUNTER — TELEPHONE (OUTPATIENT)
Dept: INTERNAL MEDICINE | Facility: CLINIC | Age: 68
End: 2018-05-31

## 2018-05-31 RX ORDER — PREGABALIN 50 MG/1
50 CAPSULE ORAL 2 TIMES DAILY
Qty: 180 CAPSULE | Refills: 1 | Status: SHIPPED | OUTPATIENT
Start: 2018-05-31 | End: 2018-12-03 | Stop reason: SDUPTHER

## 2018-06-20 ENCOUNTER — TELEPHONE (OUTPATIENT)
Dept: INTERNAL MEDICINE | Facility: CLINIC | Age: 68
End: 2018-06-20

## 2018-06-20 RX ORDER — VALSARTAN AND HYDROCHLOROTHIAZIDE 80; 12.5 MG/1; MG/1
1 TABLET, FILM COATED ORAL DAILY
Qty: 90 TABLET | Refills: 1 | Status: SHIPPED | OUTPATIENT
Start: 2018-06-20 | End: 2018-08-02 | Stop reason: RX

## 2018-07-10 ENCOUNTER — TELEPHONE (OUTPATIENT)
Dept: INTERNAL MEDICINE | Facility: CLINIC | Age: 68
End: 2018-07-10

## 2018-07-10 RX ORDER — METOPROLOL SUCCINATE 50 MG/1
50 TABLET, EXTENDED RELEASE ORAL DAILY
Qty: 90 TABLET | Refills: 2 | Status: SHIPPED | OUTPATIENT
Start: 2018-07-10 | End: 2019-04-15 | Stop reason: SDUPTHER

## 2018-07-10 RX ORDER — ATORVASTATIN CALCIUM 80 MG/1
80 TABLET, FILM COATED ORAL DAILY
Qty: 90 TABLET | Refills: 2 | Status: SHIPPED | OUTPATIENT
Start: 2018-07-10 | End: 2019-04-15 | Stop reason: SDUPTHER

## 2018-08-02 ENCOUNTER — OFFICE VISIT (OUTPATIENT)
Dept: INTERNAL MEDICINE | Facility: CLINIC | Age: 68
End: 2018-08-02

## 2018-08-02 ENCOUNTER — LAB (OUTPATIENT)
Dept: LAB | Facility: HOSPITAL | Age: 68
End: 2018-08-02

## 2018-08-02 VITALS
HEART RATE: 58 BPM | OXYGEN SATURATION: 96 % | DIASTOLIC BLOOD PRESSURE: 60 MMHG | HEIGHT: 75 IN | BODY MASS INDEX: 33.61 KG/M2 | SYSTOLIC BLOOD PRESSURE: 110 MMHG | WEIGHT: 270.3 LBS

## 2018-08-02 DIAGNOSIS — I10 ESSENTIAL HYPERTENSION: ICD-10-CM

## 2018-08-02 DIAGNOSIS — R06.09 DYSPNEA ON EXERTION: Primary | ICD-10-CM

## 2018-08-02 DIAGNOSIS — M25.50 ARTHRALGIA OF MULTIPLE JOINTS: ICD-10-CM

## 2018-08-02 DIAGNOSIS — R79.89 ABNORMAL LIVER FUNCTION TEST: ICD-10-CM

## 2018-08-02 DIAGNOSIS — R94.5 ABNORMAL RESULTS OF LIVER FUNCTION STUDIES: ICD-10-CM

## 2018-08-02 DIAGNOSIS — R53.83 FATIGUE, UNSPECIFIED TYPE: ICD-10-CM

## 2018-08-02 DIAGNOSIS — I25.10 CORONARY ARTERY DISEASE INVOLVING NATIVE CORONARY ARTERY OF NATIVE HEART, ANGINA PRESENCE UNSPECIFIED: ICD-10-CM

## 2018-08-02 LAB
ALBUMIN SERPL-MCNC: 4.8 G/DL (ref 3.4–4.8)
ALBUMIN/GLOB SERPL: 1.6 G/DL (ref 1.1–1.8)
ALP SERPL-CCNC: 79 U/L (ref 38–126)
ALT SERPL W P-5'-P-CCNC: 85 U/L (ref 21–72)
ANION GAP SERPL CALCULATED.3IONS-SCNC: 9 MMOL/L (ref 5–15)
AST SERPL-CCNC: 65 U/L (ref 17–59)
BASOPHILS # BLD AUTO: 0.04 10*3/MM3 (ref 0–0.2)
BASOPHILS NFR BLD AUTO: 0.6 % (ref 0–2)
BILIRUB SERPL-MCNC: 0.8 MG/DL (ref 0.2–1.3)
BUN BLD-MCNC: 11 MG/DL (ref 7–21)
BUN/CREAT SERPL: 15.1 (ref 7–25)
CALCIUM SPEC-SCNC: 9.6 MG/DL (ref 8.4–10.2)
CHLORIDE SERPL-SCNC: 102 MMOL/L (ref 95–110)
CO2 SERPL-SCNC: 28 MMOL/L (ref 22–31)
CREAT BLD-MCNC: 0.73 MG/DL (ref 0.7–1.3)
DEPRECATED RDW RBC AUTO: 40.8 FL (ref 35.1–43.9)
EOSINOPHIL # BLD AUTO: 0.39 10*3/MM3 (ref 0–0.7)
EOSINOPHIL NFR BLD AUTO: 5.7 % (ref 0–7)
ERYTHROCYTE [DISTWIDTH] IN BLOOD BY AUTOMATED COUNT: 12.7 % (ref 11.5–14.5)
GFR SERPL CREATININE-BSD FRML MDRD: 107 ML/MIN/1.73 (ref 49–113)
GLOBULIN UR ELPH-MCNC: 3 GM/DL (ref 2.3–3.5)
GLUCOSE BLD-MCNC: 97 MG/DL (ref 60–100)
HCT VFR BLD AUTO: 42.8 % (ref 39–49)
HGB BLD-MCNC: 14.9 G/DL (ref 13.7–17.3)
IMM GRANULOCYTES # BLD: 0.02 10*3/MM3 (ref 0–0.02)
IMM GRANULOCYTES NFR BLD: 0.3 % (ref 0–0.5)
LYMPHOCYTES # BLD AUTO: 2.16 10*3/MM3 (ref 0.6–4.2)
LYMPHOCYTES NFR BLD AUTO: 31.4 % (ref 10–50)
MCH RBC QN AUTO: 30.4 PG (ref 26.5–34)
MCHC RBC AUTO-ENTMCNC: 34.8 G/DL (ref 31.5–36.3)
MCV RBC AUTO: 87.3 FL (ref 80–98)
MONOCYTES # BLD AUTO: 1.02 10*3/MM3 (ref 0–0.9)
MONOCYTES NFR BLD AUTO: 14.8 % (ref 0–12)
NEUTROPHILS # BLD AUTO: 3.24 10*3/MM3 (ref 2–8.6)
NEUTROPHILS NFR BLD AUTO: 47.2 % (ref 37–80)
NT-PROBNP SERPL-MCNC: 127 PG/ML (ref 0–900)
PLATELET # BLD AUTO: 152 10*3/MM3 (ref 150–450)
PMV BLD AUTO: 10 FL (ref 8–12)
POTASSIUM BLD-SCNC: 3.9 MMOL/L (ref 3.5–5.1)
PROT SERPL-MCNC: 7.8 G/DL (ref 6.3–8.6)
RBC # BLD AUTO: 4.9 10*6/MM3 (ref 4.37–5.74)
SODIUM BLD-SCNC: 139 MMOL/L (ref 137–145)
TSH SERPL DL<=0.05 MIU/L-ACNC: 2.89 MIU/ML (ref 0.46–4.68)
WBC NRBC COR # BLD: 6.87 10*3/MM3 (ref 3.2–9.8)

## 2018-08-02 PROCEDURE — 36415 COLL VENOUS BLD VENIPUNCTURE: CPT | Performed by: INTERNAL MEDICINE

## 2018-08-02 PROCEDURE — 83880 ASSAY OF NATRIURETIC PEPTIDE: CPT

## 2018-08-02 PROCEDURE — 85025 COMPLETE CBC W/AUTO DIFF WBC: CPT | Performed by: INTERNAL MEDICINE

## 2018-08-02 PROCEDURE — 99214 OFFICE O/P EST MOD 30 MIN: CPT | Performed by: INTERNAL MEDICINE

## 2018-08-02 PROCEDURE — 80074 ACUTE HEPATITIS PANEL: CPT | Performed by: INTERNAL MEDICINE

## 2018-08-02 PROCEDURE — 93010 ELECTROCARDIOGRAM REPORT: CPT | Performed by: INTERNAL MEDICINE

## 2018-08-02 PROCEDURE — 84443 ASSAY THYROID STIM HORMONE: CPT | Performed by: INTERNAL MEDICINE

## 2018-08-02 PROCEDURE — 93005 ELECTROCARDIOGRAM TRACING: CPT | Performed by: INTERNAL MEDICINE

## 2018-08-02 PROCEDURE — 82550 ASSAY OF CK (CPK): CPT | Performed by: INTERNAL MEDICINE

## 2018-08-02 PROCEDURE — 80053 COMPREHEN METABOLIC PANEL: CPT | Performed by: INTERNAL MEDICINE

## 2018-08-02 RX ORDER — LOSARTAN POTASSIUM AND HYDROCHLOROTHIAZIDE 12.5; 5 MG/1; MG/1
1 TABLET ORAL DAILY
Qty: 30 TABLET | Refills: 1 | Status: SHIPPED | OUTPATIENT
Start: 2018-08-02 | End: 2018-09-17 | Stop reason: SDUPTHER

## 2018-08-02 NOTE — PROGRESS NOTES
Subjective   Robinson Quiroz is a 68 y.o. male with PMH of CAD, CABG who presents complaining of shortness of breath and fatigue over the last.  SOB is worse on exertion and is new. He denies any cough or sputum production. No chest pain but had some chest heaviness.  Has not been running any fever.    Also complains of fatigue, low energy and has stopped going to the gym. His joints are hurting but denies swollen joints or skin rashes.    Fatigue   Associated symptoms include arthralgias, fatigue and numbness. Pertinent negatives include no abdominal pain, chest pain, chills, coughing, fever, neck pain, rash, sore throat or swollen glands.   Shortness of Breath   This is a new problem. The current episode started more than 1 month ago. The problem occurs constantly. The problem has been unchanged. Pertinent negatives include no abdominal pain, chest pain, fever, neck pain, rash, sore throat, swollen glands or wheezing. Nothing aggravates the symptoms. The patient has no known risk factors for DVT/PE. He has tried nothing for the symptoms. His past medical history is significant for CAD. There is no history of chronic lung disease or a recent surgery.     Past Medical History:   Diagnosis Date   • Arthritis    • Coronary artery disease    • History of echocardiogram 09/13/2012    NRL LV systolic function w/ EF 60%. Mild CLVH. NRL diastolic function of LV myocardium. Left atrial enlargement   • HL (hearing loss)    • Hyperlipidemia    • Hypertension    • Kidney stone    • Obesity    • Pneumonia    • Stroke (CMS/HCC)      Past Surgical History:   Procedure Laterality Date   • CARDIAC SURGERY     • COLONOSCOPY  06/24/2015    Diverticulosis found in the sigmoid colon. Hemorrhoids found in the anus.   • CORONARY ARTERY BYPASS GRAFT     • HERNIA REPAIR         Social History   Substance Use Topics   • Smoking status: Former Smoker     Types: Cigarettes     Quit date: 8/24/2003   • Smokeless tobacco: Never Used       Comment: smoked 1pd or more for less than 20 years   • Alcohol use No     Family History   Problem Relation Age of Onset   • Cancer Mother    • Diabetes Mother    • Early death Mother    • Hearing loss Mother    • Hyperlipidemia Mother    • Liver disease Mother    • Pancreatic cancer Mother    • Heart disease Father    • Stroke Father    • Hearing loss Daughter    • Arthritis Maternal Aunt    • Arthritis Maternal Uncle    • Alcohol abuse Paternal Uncle    • Arthritis Maternal Grandmother    • Arthritis Maternal Grandfather      Allergies   Allergen Reactions   • Penicillins Anaphylaxis     Current Outpatient Prescriptions on File Prior to Visit   Medication Sig Dispense Refill   • aspirin 325 MG tablet Take 1 tablet by mouth Daily. 30 tablet 11   • atorvastatin (LIPITOR) 80 MG tablet Take 1 tablet by mouth Daily. 90 tablet 2   • b complex-C-folic acid 1 MG capsule Take 1 capsule by mouth Daily.     • metoprolol succinate XL (TOPROL-XL) 50 MG 24 hr tablet Take 1 tablet by mouth Daily. 90 tablet 2   • Multiple Vitamins-Minerals (MULTIVITAMIN ADULTS 50+ PO) Take 1 tablet by mouth.     • Omega-3 Fatty Acids (FISH OIL) 1000 MG capsule capsule Take 1,200 mg by mouth 2 (Two) Times a Day With Meals.     • pregabalin (LYRICA) 50 MG capsule Take 1 capsule by mouth 2 (Two) Times a Day. 180 capsule 1   • tamsulosin (FLOMAX) 0.4 MG capsule 24 hr capsule Take 1 capsule by mouth Daily. 90 capsule 0     No current facility-administered medications on file prior to visit.      Review of Systems   Constitutional: Positive for activity change and fatigue. Negative for chills and fever.   HENT: Negative for sinus pain and sore throat.    Eyes: Negative for visual disturbance.   Respiratory: Positive for chest tightness and shortness of breath. Negative for cough and wheezing.    Cardiovascular: Negative for chest pain and palpitations.   Gastrointestinal: Negative for abdominal pain, constipation and diarrhea.   Endocrine: Negative for  cold intolerance, heat intolerance, polydipsia and polyuria.   Genitourinary: Negative for difficulty urinating, flank pain and frequency.   Musculoskeletal: Positive for arthralgias. Negative for back pain, gait problem and neck pain.   Skin: Negative for color change and rash.   Neurological: Positive for numbness. Negative for dizziness, syncope and light-headedness.   Hematological: Negative for adenopathy. Does not bruise/bleed easily.       Objective   Physical Exam   Constitutional: He appears well-developed and well-nourished.   HENT:   Head: Normocephalic and atraumatic.   Nose: Nose normal.   Mouth/Throat: Oropharynx is clear and moist.   Eyes: Conjunctivae and EOM are normal. No scleral icterus.   Neck: Neck supple. No JVD present.   Cardiovascular: Normal rate, regular rhythm and normal heart sounds.    No murmur heard.  Pulmonary/Chest: Effort normal and breath sounds normal. He has no wheezes. He has no rales.   Abdominal: Soft. Bowel sounds are normal. He exhibits no mass.   Musculoskeletal: Normal range of motion. He exhibits no tenderness or deformity.   Neurological: He is alert. He displays normal reflexes. No cranial nerve deficit.   Skin: Skin is warm and dry.   Psychiatric: He has a normal mood and affect. His behavior is normal. Judgment and thought content normal.   Nursing note and vitals reviewed.          Patient Active Problem List   Diagnosis   • Essential hypertension   • Coronary artery disease involving native coronary artery without angina pectoris   • Chronic right shoulder pain   • Rotator cuff strain   • Coronary artery disease involving native heart   • Chest pain   • Hyperlipidemia   • Peripheral polyneuropathy         Study Result     Date of Service:    01/03/2017     NUCLEAR MEDICINE MYOCARDIAL MULTI-IMAGING STUDY, MYOCARDIAL PERFUSION  SCAN WITH WALL MOTION AND MYOCARDIAL PERFUSION SCAN WITH EJECTION  FRACTION:        This is a 2 day study protocol.     INDICATION:  Chest  pain.     DESCRIPTION OF PROCEDURE:  After discussing the indication and all  potential complications of nuclear perfusion imaging stress test,  patient underwent chemical stress test using Lexiscan. Lexiscan 0.4 mg  was administered intravenously. Following Lexiscan, 35.3 mCi of  technetium sestamibi was administered intravenously. On the stress  portion, nuclear imaging was obtained under the gamma SPECT camera. On  day 2, 36.8 mCi of technetium sestamibi was administered intravenously  and resting nuclear images were obtained under the gamma SPECT camera.     FINDINGS:  1.   EKG: The EKG portion of the test reveals sinus rhythm with right       bundle branch block with no ischemic changes. The EKG portion of       the test was nondiagnostic.  2.   NUCLEAR IMAGES: The nuclear images reveal evidence of perfusion       defect which appears to be large in size in the anterior segment of       the left ventricular myocardium without moderate reversibility.       There is evidence of perfusion defect also noted in the       anteroapical segment of the left ventricular myocardium which       appears to be fixed and mildly reversible. Gated SPECT images       performed during stress, however, reveal decreased myocardial       thickening noted in the anterior and inferolateral segment of the       left ventricular myocardium. Calculated ejection fraction was 64%.     FINAL IMPRESSION:  1.   This is an abnormal myocardial perfusion imaging stress test with       evidence of fixed perfusion defect noted in the anterior and       inferolateral segment of the left ventricular myocardium  2.   Normal LV systolic function with decreased myocardial thickening       noted in the anterior and anteroapical and inferolateral wall of       the left ventricular myocardium.        Read/Dictated By:   PADMINI ARIAS MD                      CARDIOLOGIST     Electronically Signed  01/14/2017 08:03:07  Signed:  PADMINI ARIAS MD        Results  for orders placed or performed in visit on 08/02/18   Comprehensive metabolic panel   Result Value Ref Range    Glucose 97 60 - 100 mg/dL    BUN 11 7 - 21 mg/dL    Creatinine 0.73 0.70 - 1.30 mg/dL    Sodium 139 137 - 145 mmol/L    Potassium 3.9 3.5 - 5.1 mmol/L    Chloride 102 95 - 110 mmol/L    CO2 28.0 22.0 - 31.0 mmol/L    Calcium 9.6 8.4 - 10.2 mg/dL    Total Protein 7.8 6.3 - 8.6 g/dL    Albumin 4.80 3.40 - 4.80 g/dL    ALT (SGPT) 85 (H) 21 - 72 U/L    AST (SGOT) 65 (H) 17 - 59 U/L    Alkaline Phosphatase 79 38 - 126 U/L    Total Bilirubin 0.8 0.2 - 1.3 mg/dL    eGFR Non  Amer 107 49 - 113 mL/min/1.73    Globulin 3.0 2.3 - 3.5 gm/dL    A/G Ratio 1.6 1.1 - 1.8 g/dL    BUN/Creatinine Ratio 15.1 7.0 - 25.0    Anion Gap 9.0 5.0 - 15.0 mmol/L   TSH   Result Value Ref Range    TSH 2.890 0.460 - 4.680 mIU/mL   CBC Auto Differential   Result Value Ref Range    WBC 6.87 3.20 - 9.80 10*3/mm3    RBC 4.90 4.37 - 5.74 10*6/mm3    Hemoglobin 14.9 13.7 - 17.3 g/dL    Hematocrit 42.8 39.0 - 49.0 %    MCV 87.3 80.0 - 98.0 fL    MCH 30.4 26.5 - 34.0 pg    MCHC 34.8 31.5 - 36.3 g/dL    RDW 12.7 11.5 - 14.5 %    RDW-SD 40.8 35.1 - 43.9 fl    MPV 10.0 8.0 - 12.0 fL    Platelets 152 150 - 450 10*3/mm3    Neutrophil % 47.2 37.0 - 80.0 %    Lymphocyte % 31.4 10.0 - 50.0 %    Monocyte % 14.8 (H) 0.0 - 12.0 %    Eosinophil % 5.7 0.0 - 7.0 %    Basophil % 0.6 0.0 - 2.0 %    Immature Grans % 0.3 0.0 - 0.5 %    Neutrophils, Absolute 3.24 2.00 - 8.60 10*3/mm3    Lymphocytes, Absolute 2.16 0.60 - 4.20 10*3/mm3    Monocytes, Absolute 1.02 (H) 0.00 - 0.90 10*3/mm3    Eosinophils, Absolute 0.39 0.00 - 0.70 10*3/mm3    Basophils, Absolute 0.04 0.00 - 0.20 10*3/mm3    Immature Grans, Absolute 0.02 0.00 - 0.02 10*3/mm3       XR Chest PA & Lateral   Order: [id number]   Status:  Final result   Visible to patient:  No (Not Released) Dx:  Dyspnea on exertion   Details     Reading Physician Reading Date Result Priority   Lalo Dawson MD  8/2/2018    Narrative       [facility] [place]    Patient Name: ROBINSON SOLOMON    ORDERING: SUSAN MCLEAN     ATTENDING: SUSAN MCLEAN     REFERRING: SUSAN MCLEAN    -----------------------    PROCEDURE: Two-view chest    COMPARISON: 8/24/2016    HISTORY: sob, R06.09 Other forms of dyspnea    FINDINGS: Frontal and lateral views of the chest are obtained.     Devices: None    Lungs/Pleura: No overt pulmonary vascular congestion, focal  pulmonary parenchymal opacity, pleural effusion or pneumothorax.    Cardiomediastinal structures: Sternal suture wires appear stable.  Cardiac silhouette is normal in size. Thoracic aorta contains  atherosclerotic calcification.         Impression     CONCLUSION:    No acute cardiopulmonary disease    Electronically signed by:  Lalo Dawson MD  8/2/2018 4:02 PM CDT  Workstation: [id number]               Assessment/Plan   Robinson was seen today for fatigue and shortness of breath.    Diagnoses and all orders for this visit:    Dyspnea on exertion  -     XR Chest PA & Lateral  -     ECG 12 Lead  -     CBC & Differential  -     Comprehensive metabolic panel  -     TSH  -     Cancel: BNP  -     CBC Auto Differential    Coronary artery disease involving native coronary artery of native heart, angina presence unspecified  -     ECG 12 Lead  -     Ambulatory Referral to Cardiology    Essential hypertension  -     Ambulatory Referral to Cardiology    Fatigue, unspecified type  -     CK; Future  -     Sedimentation rate, automated; Future  -     Lyme Disease IgG/IgM Antibodies; Future    BMI 33.0-33.9,adult    Arthralgia of multiple joints  -     Lyme Disease IgG/IgM Antibodies; Future    Other orders  -     losartan-hydrochlorothiazide (HYZAAR) 50-12.5 MG per tablet; Take 1 tablet by mouth Daily.           Plan      1. Symptoms are nonspecific.  Chest X-Ray reviewed with the patient is essentially abnormal.  Labs ordered.  2.ECG obtained.No changes from baseline noted.  Will  refer back to cardiology.  3.Hyzaar, Metoprolol.  DASH.  4.Will monitor clinically.  Patient is advised to take plenty of rest and increase intake of fluids.  5.Weight loss discussed. Calories restricted diets reviewed.  Activity: Approximately 150 minutes of moderate activity (such as walking program)  per week (20-30 minutes most days of the week)  Diet: Heart healthy foods - more fresh fruits and vegetables and whole grains; less red meat; more fish and poultry that is baked or grilled - not fried; less salt not to exceed 2000 mg daily - less processed food; No trans or saturated fat  Non Smoker   Weight management: Patient's Body mass index is 33.79 kg/m². BMI is above normal parameters. Recommendations include: educational material, exercise counseling and nutrition counseling.  6. No evidence of inflammatory arthritis on physical exam.  Will check ESR, Lyme titers and C-reactive protein.  Could be viral syndrome.            Follow up in 1 month.                This document has been electronically signed by Hillary Bailey MD on August 7, 2018 1:57 PM

## 2018-08-07 DIAGNOSIS — R94.5 ABNORMAL RESULTS OF LIVER FUNCTION STUDIES: ICD-10-CM

## 2018-08-07 DIAGNOSIS — R79.89 ABNORMAL LIVER FUNCTION TEST: Primary | ICD-10-CM

## 2018-08-07 LAB — CK SERPL-CCNC: 190 U/L (ref 55–170)

## 2018-08-08 ENCOUNTER — LAB (OUTPATIENT)
Dept: LAB | Facility: HOSPITAL | Age: 68
End: 2018-08-08

## 2018-08-08 DIAGNOSIS — R74.8 ELEVATED CPK: ICD-10-CM

## 2018-08-08 DIAGNOSIS — M25.50 ARTHRALGIA OF MULTIPLE JOINTS: Primary | ICD-10-CM

## 2018-08-08 DIAGNOSIS — M25.50 ARTHRALGIA OF MULTIPLE JOINTS: ICD-10-CM

## 2018-08-08 DIAGNOSIS — R74.8 ELEVATED CPK: Primary | ICD-10-CM

## 2018-08-08 DIAGNOSIS — R53.83 FATIGUE, UNSPECIFIED TYPE: Primary | ICD-10-CM

## 2018-08-08 DIAGNOSIS — M79.10 MYALGIA: Primary | ICD-10-CM

## 2018-08-08 LAB
ERYTHROCYTE [SEDIMENTATION RATE] IN BLOOD: 8 MM/HR (ref 0–15)
HAV IGM SERPL QL IA: NEGATIVE
HBV CORE IGM SERPL QL IA: NEGATIVE
HBV SURFACE AG SERPL QL IA: NEGATIVE
HCV AB SER DONR QL: NEGATIVE

## 2018-08-08 PROCEDURE — 82085 ASSAY OF ALDOLASE: CPT

## 2018-08-08 PROCEDURE — 86038 ANTINUCLEAR ANTIBODIES: CPT

## 2018-08-08 PROCEDURE — 85651 RBC SED RATE NONAUTOMATED: CPT

## 2018-08-08 PROCEDURE — 86430 RHEUMATOID FACTOR TEST QUAL: CPT

## 2018-08-08 PROCEDURE — 36415 COLL VENOUS BLD VENIPUNCTURE: CPT

## 2018-08-08 PROCEDURE — 86618 LYME DISEASE ANTIBODY: CPT

## 2018-08-09 LAB
ALDOLASE SERPL-CCNC: 7.8 U/L (ref 3.3–10.3)
ANA SER QL: NEGATIVE
B BURGDOR IGG+IGM SER-ACNC: <0.91 ISR (ref 0–0.9)
RHEUMATOID FACT SERPL-ACNC: NEGATIVE [IU]/ML

## 2018-08-10 ENCOUNTER — OFFICE VISIT (OUTPATIENT)
Dept: INTERNAL MEDICINE | Facility: CLINIC | Age: 68
End: 2018-08-10

## 2018-08-10 VITALS
SYSTOLIC BLOOD PRESSURE: 120 MMHG | WEIGHT: 267.8 LBS | BODY MASS INDEX: 33.3 KG/M2 | HEIGHT: 75 IN | DIASTOLIC BLOOD PRESSURE: 60 MMHG

## 2018-08-10 DIAGNOSIS — I10 ESSENTIAL HYPERTENSION: ICD-10-CM

## 2018-08-10 DIAGNOSIS — B34.9 VIRAL ILLNESS: ICD-10-CM

## 2018-08-10 DIAGNOSIS — I25.10 CORONARY ARTERY DISEASE INVOLVING NATIVE CORONARY ARTERY OF NATIVE HEART WITHOUT ANGINA PECTORIS: ICD-10-CM

## 2018-08-10 DIAGNOSIS — R53.83 FATIGUE, UNSPECIFIED TYPE: Primary | ICD-10-CM

## 2018-08-10 PROCEDURE — 99213 OFFICE O/P EST LOW 20 MIN: CPT | Performed by: INTERNAL MEDICINE

## 2018-08-13 ENCOUNTER — TELEPHONE (OUTPATIENT)
Dept: INTERNAL MEDICINE | Facility: CLINIC | Age: 68
End: 2018-08-13

## 2018-08-13 RX ORDER — TAMSULOSIN HYDROCHLORIDE 0.4 MG/1
1 CAPSULE ORAL DAILY
Qty: 90 CAPSULE | Refills: 1 | Status: SHIPPED | OUTPATIENT
Start: 2018-08-13 | End: 2019-02-05 | Stop reason: SDUPTHER

## 2018-08-31 ENCOUNTER — TELEPHONE (OUTPATIENT)
Dept: INTERNAL MEDICINE | Facility: CLINIC | Age: 68
End: 2018-08-31

## 2018-09-17 ENCOUNTER — OFFICE VISIT (OUTPATIENT)
Dept: CARDIOLOGY | Facility: CLINIC | Age: 68
End: 2018-09-17

## 2018-09-17 ENCOUNTER — TELEPHONE (OUTPATIENT)
Dept: INTERNAL MEDICINE | Facility: CLINIC | Age: 68
End: 2018-09-17

## 2018-09-17 VITALS
BODY MASS INDEX: 33.82 KG/M2 | DIASTOLIC BLOOD PRESSURE: 50 MMHG | HEART RATE: 62 BPM | SYSTOLIC BLOOD PRESSURE: 109 MMHG | WEIGHT: 272 LBS | HEIGHT: 75 IN

## 2018-09-17 DIAGNOSIS — I25.10 CORONARY ARTERY DISEASE INVOLVING NATIVE CORONARY ARTERY OF NATIVE HEART WITHOUT ANGINA PECTORIS: Primary | ICD-10-CM

## 2018-09-17 DIAGNOSIS — E78.00 PURE HYPERCHOLESTEROLEMIA: ICD-10-CM

## 2018-09-17 DIAGNOSIS — I10 ESSENTIAL HYPERTENSION: ICD-10-CM

## 2018-09-17 PROCEDURE — 99213 OFFICE O/P EST LOW 20 MIN: CPT | Performed by: INTERNAL MEDICINE

## 2018-09-17 RX ORDER — LOSARTAN POTASSIUM AND HYDROCHLOROTHIAZIDE 12.5; 5 MG/1; MG/1
1 TABLET ORAL DAILY
Qty: 90 TABLET | Refills: 1 | Status: SHIPPED | OUTPATIENT
Start: 2018-09-17 | End: 2018-09-27 | Stop reason: SDUPTHER

## 2018-09-17 NOTE — PROGRESS NOTES
[facility]  OFFICE NOTE    Robinson Quiroz  68 y.o. male    09/17/2018  1. Coronary artery disease involving native coronary artery of native heart without angina pectoris    2. Pure hypercholesterolemia    3. Essential hypertension        Chief complaint -Follow-up CAD      History of present Illness-68 year old gentleman with history of coronary disease status post quadruple bypass in 2004,  had stent placement to left circumflex artery in 2013, had a abdominal aortic aneurysm screening last year and was okay.  He had a nuclear stress test in January 2017 which showed no ischemia EF was normal.  He is doing well no chest pain or shortness of breath is on optimal medical therapy with beta blockers and angiotensin receptor blockers and his blood pressure is well controlled.  His BMI is high talked to him about diet and exercise.  Denies any GI symptoms will CNS symptoms.         Allergies   Allergen Reactions   • Penicillins Anaphylaxis         Past Medical History:   Diagnosis Date   • Arthritis    • Coronary artery disease    • History of echocardiogram 09/13/2012    NRL LV systolic function w/ EF 60%. Mild CLVH. NRL diastolic function of LV myocardium. Left atrial enlargement   • HL (hearing loss)    • Hyperlipidemia    • Hypertension    • Kidney stone    • Obesity    • Pneumonia    • Stroke (CMS/HCC)          Past Surgical History:   Procedure Laterality Date   • CARDIAC SURGERY     • COLONOSCOPY  06/24/2015    Diverticulosis found in the sigmoid colon. Hemorrhoids found in the anus.   • CORONARY ARTERY BYPASS GRAFT     • HERNIA REPAIR           Family History   Problem Relation Age of Onset   • Cancer Mother    • Diabetes Mother    • Early death Mother    • Hearing loss Mother    • Hyperlipidemia Mother    • Liver disease Mother    • Pancreatic cancer Mother    • Heart disease Father    • Stroke Father    • Hearing loss Daughter    • Arthritis Maternal Aunt    • Arthritis  Maternal Uncle    • Alcohol abuse Paternal Uncle    • Arthritis Maternal Grandmother    • Arthritis Maternal Grandfather          Social History     Social History   • Marital status: Single     Spouse name: N/A   • Number of children: N/A   • Years of education: N/A     Occupational History   • Not on file.     Social History Main Topics   • Smoking status: Former Smoker     Types: Cigarettes     Quit date: 8/24/2003   • Smokeless tobacco: Never Used      Comment: smoked 1pd or more for less than 20 years   • Alcohol use No   • Drug use: No   • Sexual activity: Yes     Other Topics Concern   • Not on file     Social History Narrative   • No narrative on file         Current Outpatient Prescriptions   Medication Sig Dispense Refill   • aspirin 325 MG tablet Take 1 tablet by mouth Daily. 30 tablet 11   • atorvastatin (LIPITOR) 80 MG tablet Take 1 tablet by mouth Daily. 90 tablet 2   • b complex-C-folic acid 1 MG capsule Take 1 capsule by mouth Daily.     • losartan-hydrochlorothiazide (HYZAAR) 50-12.5 MG per tablet Take 1 tablet by mouth Daily. 30 tablet 1   • metoprolol succinate XL (TOPROL-XL) 50 MG 24 hr tablet Take 1 tablet by mouth Daily. 90 tablet 2   • Multiple Vitamins-Minerals (MULTIVITAMIN ADULTS 50+ PO) Take 1 tablet by mouth.     • Omega-3 Fatty Acids (FISH OIL) 1000 MG capsule capsule Take 1,200 mg by mouth 2 (Two) Times a Day With Meals.     • pregabalin (LYRICA) 50 MG capsule Take 1 capsule by mouth 2 (Two) Times a Day. 180 capsule 1   • tamsulosin (FLOMAX) 0.4 MG capsule 24 hr capsule Take 1 capsule by mouth Daily. 90 capsule 1     No current facility-administered medications for this visit.          Review of Systems     Constitution: Denies any fatigue, fever or chills    HENT: Denies any headache, hearing impairment,     Eyes: Denies any blurring of vision, or photophobia     Cardivascular - As per history of present illness     Respiratory system-denies any COPD, shortness of breath,   sleep  "apnea.     Endocrine:   history of hyperlipidemia       Musculoskeletal:   history of arthritis with musculoskeletal problems    Gastrointestinal: No nausea, vomiting, or melena    Genitourinary: No dysuria or hematuria    Neurological:   No history of seizure disorder, stroke, memory problems    Psychiatric/Behavioral:        No history of depression,      Hematological- no history of easy bruising or any bleeding diathesis            OBJECTIVE    /50   Pulse 62   Ht 190.5 cm (75\")   Wt 123 kg (272 lb)   BMI 34.00 kg/m²       Physical Exam     Constitutional: is oriented to person, place, and time.     Skin-warm and dry    Well developed and nourished in no acute distress      Head: Normocephalic and atraumatic.     Eyes: Pupils are equal, round, and reactive to light.     Neck: Neck supple. No bruit in the carotids, no elevation of JVD    Cardiovascular: Filion in the fifth intercostal space   Regular rate, and  Rhythm,    S1 greater than S2, no S3 or S4, no gallop     Pulmonary/Chest:   Air  Entry is equal on both sides  No wheezing or crackles,      Abdominal: Soft.  No hepatosplenomegaly, bowel sounds are present    Musculoskeletal: No kyphoscoliosis, no significant thickening of the joints    Neurological: is alert and oriented to person, place, and time.    cranial nerve are intact .   No motor or sensory deficit    Extremities-no edema, no radial femoral delay      Psychiatric: He has a normal mood and affect.                  His behavior is normal.           Procedures      Lab Results   Component Value Date    WBC 6.87 08/02/2018    HGB 14.9 08/02/2018    HCT 42.8 08/02/2018    MCV 87.3 08/02/2018     08/02/2018     Lab Results   Component Value Date    GLUCOSE 97 08/02/2018    BUN 11 08/02/2018    CREATININE 0.73 08/02/2018    EGFRIFNONA 107 08/02/2018    BCR 15.1 08/02/2018    CO2 28.0 08/02/2018    CALCIUM 9.6 08/02/2018    ALBUMIN 4.80 08/02/2018    AST 65 (H) 08/02/2018    ALT 85 (H) " 08/02/2018     Lab Results   Component Value Date    CHOL 122 11/06/2017    CHOL 118 05/04/2017     Lab Results   Component Value Date    TRIG 181 11/06/2017    TRIG 167 05/04/2017    TRIG 159 01/29/2016     Lab Results   Component Value Date    HDL 40 (L) 11/06/2017    HDL 42 (L) 05/04/2017     No components found for: LDLCALC  Lab Results   Component Value Date    LDL 45 11/06/2017    LDL 45 05/04/2017    LDL 56 01/29/2016     No results found for: HDLLDLRATIO  No components found for: CHOLHDL  Lab Results   Component Value Date    HGBA1C 5.6 01/29/2016     Lab Results   Component Value Date    TSH 2.890 08/02/2018                  A/P    CAD status post CABG-history of stent placement to circumflex proximal artery in 2013 asked to take a regular aspirin and he had a stress test in January 2017 which was unremarkable we'll continue medical therapy.    Hypertension continue the Toprol in the evening and Hyzaar in the morning.    Hyperlipidemia on atorvastatin doing okay.  And he was taken a regular aspirin every day.    Follow-up in 1 year            This document has been electronically signed by Kane Maxwell MD on September 17, 2018 10:40 AM       EMR Dragon/Transcription disclaimer:   Some of this note may be an electronic transcription/translation of spoken language to printed text. The electronic translation of spoken language may permit erroneous, or at times, nonsensical words or phrases to be inadvertently transcribed; Although I have reviewed the note for such errors, some may still exist.

## 2018-09-27 RX ORDER — LOSARTAN POTASSIUM AND HYDROCHLOROTHIAZIDE 12.5; 5 MG/1; MG/1
TABLET ORAL
Qty: 90 TABLET | Refills: 1 | Status: SHIPPED | OUTPATIENT
Start: 2018-09-27 | End: 2019-04-01 | Stop reason: SDUPTHER

## 2018-11-09 ENCOUNTER — OFFICE VISIT (OUTPATIENT)
Dept: FAMILY MEDICINE CLINIC | Facility: CLINIC | Age: 68
End: 2018-11-09

## 2018-11-09 VITALS
OXYGEN SATURATION: 93 % | BODY MASS INDEX: 33.88 KG/M2 | WEIGHT: 272.5 LBS | SYSTOLIC BLOOD PRESSURE: 152 MMHG | HEIGHT: 75 IN | DIASTOLIC BLOOD PRESSURE: 60 MMHG | HEART RATE: 61 BPM

## 2018-11-09 DIAGNOSIS — E78.00 PURE HYPERCHOLESTEROLEMIA: ICD-10-CM

## 2018-11-09 DIAGNOSIS — I10 ESSENTIAL HYPERTENSION: ICD-10-CM

## 2018-11-09 DIAGNOSIS — I25.10 CORONARY ARTERY DISEASE INVOLVING NATIVE CORONARY ARTERY OF NATIVE HEART WITHOUT ANGINA PECTORIS: Primary | ICD-10-CM

## 2018-11-09 PROCEDURE — 99214 OFFICE O/P EST MOD 30 MIN: CPT | Performed by: FAMILY MEDICINE

## 2018-11-09 NOTE — PROGRESS NOTES
Subjective   Robinson Quiroz is a 68 y.o. male.   CC: establish care  History of Present Illness The patient comes in for check of their chronic medical issues which include Hyperlipidemia,Hypertension and BPH. He denies c/o chest pain or D.O.E. .       The following portions of the patient's history were reviewed and updated as appropriate: allergies, current medications, past family history, past medical history, past social history, past surgical history and problem list.    Review of Systems   Constitutional: Negative for fatigue and fever.   Respiratory: Negative for cough, chest tightness and stridor.    Cardiovascular: Negative for chest pain, palpitations and leg swelling.       Objective   Physical Exam   Constitutional: He appears well-developed and well-nourished.   HENT:   Head: Normocephalic and atraumatic.   Right Ear: External ear normal.   Left Ear: External ear normal.   Nose: Nose normal.   Mouth/Throat: Oropharynx is clear and moist.   Eyes: Pupils are equal, round, and reactive to light.   Neck: Normal range of motion.   Cardiovascular: Normal rate, regular rhythm and normal heart sounds.  Exam reveals no gallop and no friction rub.    No murmur heard.  Pulmonary/Chest: Effort normal and breath sounds normal. No respiratory distress. He has no wheezes. He has no rales.   Abdominal: Soft. Bowel sounds are normal. He exhibits no distension. There is no tenderness. There is no guarding.   Neurological: He is alert.   Skin: Skin is warm and dry.   Vitals reviewed.        Assessment/Plan   Robinson was seen today for establish care.    Diagnoses and all orders for this visit:    Coronary artery disease involving native coronary artery of native heart without angina pectoris    Essential hypertension    Pure hypercholesterolemia      Return to the clinic in 6 month/s.  Will contact with results as needed.  Labs reviewed.

## 2018-12-03 ENCOUNTER — TELEPHONE (OUTPATIENT)
Dept: FAMILY MEDICINE CLINIC | Facility: CLINIC | Age: 68
End: 2018-12-03

## 2018-12-03 RX ORDER — PREGABALIN 50 MG/1
50 CAPSULE ORAL 2 TIMES DAILY
Qty: 180 CAPSULE | Refills: 1 | Status: SHIPPED | OUTPATIENT
Start: 2018-12-03 | End: 2019-06-03 | Stop reason: DRUGHIGH

## 2018-12-03 NOTE — TELEPHONE ENCOUNTER
Wife has called to get refills on his pregabalin (LYRICA) 50 MG capsule. She states he will not have enough to finish today. She is needing this called in asap. [facility]. Call her at [phone]

## 2019-02-05 RX ORDER — TAMSULOSIN HYDROCHLORIDE 0.4 MG/1
1 CAPSULE ORAL DAILY
Qty: 90 CAPSULE | Refills: 1 | Status: SHIPPED | OUTPATIENT
Start: 2019-02-05 | End: 2019-07-30 | Stop reason: SDUPTHER

## 2019-02-06 ENCOUNTER — OFFICE VISIT (OUTPATIENT)
Dept: SLEEP MEDICINE | Facility: HOSPITAL | Age: 69
End: 2019-02-06

## 2019-02-06 VITALS
HEIGHT: 75 IN | DIASTOLIC BLOOD PRESSURE: 66 MMHG | HEART RATE: 62 BPM | OXYGEN SATURATION: 92 % | SYSTOLIC BLOOD PRESSURE: 132 MMHG | WEIGHT: 273.9 LBS | BODY MASS INDEX: 34.06 KG/M2

## 2019-02-06 DIAGNOSIS — G47.33 OBSTRUCTIVE SLEEP APNEA, ADULT: Primary | ICD-10-CM

## 2019-02-06 PROCEDURE — 99213 OFFICE O/P EST LOW 20 MIN: CPT | Performed by: INTERNAL MEDICINE

## 2019-02-06 NOTE — PROGRESS NOTES
Sleep Clinic Follow Up    Date: 2019  Primary Care Physician: Merrick Gorman MD    Last office visit: 2018 (I reviewed this note)    CC: Follow up: JAVIER    Sleep Testin. PSG on 04/10/2013, AHI of 18  2. CPAP titration on same day, recommended 10 cm H2O  3. Currently on 9 cm H2O      Assessment and Plan:    1. Obstructive sleep apnea Established, stable (1)  1. Compliant and improved with PAP therapy  2. Continue PAP as prescribed.   3. Script for PAP supplies      Interim History (1-3/4):  Since the last visit:    1) moderate JAVIER -  Robinson Quiroz has remained compliant with CPAP. He denies mask and machine issues, dry mouth, headaches, or pressures intolerance. He denies abnormal dreams, sleep paralysis, nasal congestion, URI sx.    PAP Data:    Time frame: 2018 - 2019   Compliance 91.2 %  Average use on days used: 5hrs 51 min  Percent of days with usage greater than or equal to 4 hours: 75.3%  PAP range : 9 cm H2O  Average 90% pressure: 9 cmH2O  Leak: <1 minutes  Average AHI 1.2 events/hr  Mask type: Nasal pillows  DME: [facility]    Bed time: 2230  Sleep latency: 5-15 minutes  Number of times awakens during the night: 0-1  Wake time: 0700  Estimated total sleep time at night: 6-7 hours  Caffeine intake: 24oz of coffee, 0oz of tea, and 12oz of soda  Alcohol intake: 0 drinks per week  Nap time: none   Sleepiness with Driving: none    [place] - 5    2) Patient denies RLS symptoms.     PMHx, FH, SH reviewed and pertinent changes are: unchanged from last office visit on 2018      REVIEW OF SYSTEMS:   Negative for chest pain, fever, cough, SOA, abdominal pain. Smoking:none      Exam ():  Vitals:    19 1505   BP: 132/66   Pulse: 62   SpO2: 92%     Body mass index is 34.24 kg/m². Patient's Body mass index is 34.24 kg/m². BMI is above normal parameters. Recommendations include: referral to primary care.      Gen:  No acute distress, alert, oriented  Lungs:  CTA with  normal effort   CV:  RRR, no M/R/G  GI:  soft, non-tender  Psych:  Appropriate affect      Past Medical History:   Diagnosis Date   • Arthritis    • Coronary artery disease    • History of echocardiogram 09/13/2012    NRL LV systolic function w/ EF 60%. Mild CLVH. NRL diastolic function of LV myocardium. Left atrial enlargement   • HL (hearing loss)    • Hyperlipidemia    • Hypertension    • Kidney stone    • Obesity    • Pneumonia    • Stroke (CMS/HCC)        Current Outpatient Medications:   •  aspirin 325 MG tablet, Take 1 tablet by mouth Daily., Disp: 30 tablet, Rfl: 11  •  atorvastatin (LIPITOR) 80 MG tablet, Take 1 tablet by mouth Daily., Disp: 90 tablet, Rfl: 2  •  b complex-C-folic acid 1 MG capsule, Take 1 capsule by mouth Daily., Disp: , Rfl:   •  losartan-hydrochlorothiazide (HYZAAR) 50-12.5 MG per tablet, TAKE 1 TABLET BY MOUTH EVERY DAY, Disp: 90 tablet, Rfl: 1  •  metoprolol succinate XL (TOPROL-XL) 50 MG 24 hr tablet, Take 1 tablet by mouth Daily., Disp: 90 tablet, Rfl: 2  •  Multiple Vitamins-Minerals (MULTIVITAMIN ADULTS 50+ PO), Take 1 tablet by mouth., Disp: , Rfl:   •  Omega-3 Fatty Acids (FISH OIL) 1000 MG capsule capsule, Take 1,200 mg by mouth 2 (Two) Times a Day With Meals., Disp: , Rfl:   •  pregabalin (LYRICA) 50 MG capsule, Take 1 capsule by mouth 2 (Two) Times a Day., Disp: 180 capsule, Rfl: 1  •  tamsulosin (FLOMAX) 0.4 MG capsule 24 hr capsule, Take 1 capsule by mouth Daily., Disp: 90 capsule, Rfl: 1    Total time 15 min, more than half spent in face to face counseling and coordination of care.    RTC in 12 months     This document has been electronically signed by Blu Chin MD on February 6, 2019         CC: Merrick Gorman MD          No ref. provider found

## 2019-04-01 ENCOUNTER — TELEPHONE (OUTPATIENT)
Dept: FAMILY MEDICINE CLINIC | Facility: CLINIC | Age: 69
End: 2019-04-01

## 2019-04-01 RX ORDER — LOSARTAN POTASSIUM AND HYDROCHLOROTHIAZIDE 12.5; 5 MG/1; MG/1
1 TABLET ORAL DAILY
Qty: 90 TABLET | Refills: 1 | Status: SHIPPED | OUTPATIENT
Start: 2019-04-01 | End: 2019-12-23 | Stop reason: SDUPTHER

## 2019-04-15 RX ORDER — ATORVASTATIN CALCIUM 80 MG/1
80 TABLET, FILM COATED ORAL DAILY
Qty: 30 TABLET | Refills: 0 | Status: SHIPPED | OUTPATIENT
Start: 2019-04-15 | End: 2019-05-09 | Stop reason: SDUPTHER

## 2019-04-15 RX ORDER — METOPROLOL SUCCINATE 50 MG/1
50 TABLET, EXTENDED RELEASE ORAL DAILY
Qty: 30 TABLET | Refills: 0 | Status: SHIPPED | OUTPATIENT
Start: 2019-04-15 | End: 2019-05-09 | Stop reason: SDUPTHER

## 2019-05-09 ENCOUNTER — OFFICE VISIT (OUTPATIENT)
Dept: FAMILY MEDICINE CLINIC | Facility: CLINIC | Age: 69
End: 2019-05-09

## 2019-05-09 ENCOUNTER — LAB (OUTPATIENT)
Dept: LAB | Facility: HOSPITAL | Age: 69
End: 2019-05-09

## 2019-05-09 VITALS
OXYGEN SATURATION: 98 % | WEIGHT: 273.19 LBS | SYSTOLIC BLOOD PRESSURE: 138 MMHG | HEART RATE: 61 BPM | HEIGHT: 75 IN | DIASTOLIC BLOOD PRESSURE: 80 MMHG | BODY MASS INDEX: 33.97 KG/M2

## 2019-05-09 DIAGNOSIS — E78.00 PURE HYPERCHOLESTEROLEMIA: ICD-10-CM

## 2019-05-09 DIAGNOSIS — I25.10 CORONARY ARTERY DISEASE INVOLVING NATIVE CORONARY ARTERY OF NATIVE HEART WITHOUT ANGINA PECTORIS: ICD-10-CM

## 2019-05-09 DIAGNOSIS — I10 ESSENTIAL HYPERTENSION: ICD-10-CM

## 2019-05-09 DIAGNOSIS — I10 ESSENTIAL HYPERTENSION: Primary | ICD-10-CM

## 2019-05-09 LAB
ALBUMIN SERPL-MCNC: 4.7 G/DL (ref 3.5–5.2)
ALBUMIN/GLOB SERPL: 1.7 G/DL
ALP SERPL-CCNC: 74 U/L (ref 39–117)
ALT SERPL W P-5'-P-CCNC: 97 U/L (ref 1–41)
ANION GAP SERPL CALCULATED.3IONS-SCNC: 12.8 MMOL/L
AST SERPL-CCNC: 75 U/L (ref 1–40)
BASOPHILS # BLD AUTO: 0.07 10*3/MM3 (ref 0–0.2)
BASOPHILS NFR BLD AUTO: 1.2 % (ref 0–1.5)
BILIRUB SERPL-MCNC: 0.7 MG/DL (ref 0.2–1.2)
BUN BLD-MCNC: 11 MG/DL (ref 8–23)
BUN/CREAT SERPL: 13.4 (ref 7–25)
CALCIUM SPEC-SCNC: 9.5 MG/DL (ref 8.6–10.5)
CHLORIDE SERPL-SCNC: 100 MMOL/L (ref 98–107)
CHOLEST SERPL-MCNC: 106 MG/DL (ref 0–200)
CO2 SERPL-SCNC: 27.2 MMOL/L (ref 22–29)
CREAT BLD-MCNC: 0.82 MG/DL (ref 0.76–1.27)
DEPRECATED RDW RBC AUTO: 41 FL (ref 37–54)
EOSINOPHIL # BLD AUTO: 0.4 10*3/MM3 (ref 0–0.4)
EOSINOPHIL NFR BLD AUTO: 6.9 % (ref 0.3–6.2)
ERYTHROCYTE [DISTWIDTH] IN BLOOD BY AUTOMATED COUNT: 12.6 % (ref 12.3–15.4)
GFR SERPL CREATININE-BSD FRML MDRD: 93 ML/MIN/1.73
GLOBULIN UR ELPH-MCNC: 2.7 GM/DL
GLUCOSE BLD-MCNC: 98 MG/DL (ref 65–99)
HCT VFR BLD AUTO: 44.1 % (ref 37.5–51)
HDLC SERPL-MCNC: 47 MG/DL (ref 40–60)
HGB BLD-MCNC: 14.9 G/DL (ref 13–17.7)
IMM GRANULOCYTES # BLD AUTO: 0.02 10*3/MM3 (ref 0–0.05)
IMM GRANULOCYTES NFR BLD AUTO: 0.3 % (ref 0–0.5)
LDLC SERPL CALC-MCNC: 34 MG/DL (ref 0–100)
LDLC/HDLC SERPL: 0.73 {RATIO}
LYMPHOCYTES # BLD AUTO: 1.65 10*3/MM3 (ref 0.7–3.1)
LYMPHOCYTES NFR BLD AUTO: 28.5 % (ref 19.6–45.3)
MCH RBC QN AUTO: 30 PG (ref 26.6–33)
MCHC RBC AUTO-ENTMCNC: 33.8 G/DL (ref 31.5–35.7)
MCV RBC AUTO: 88.9 FL (ref 79–97)
MONOCYTES # BLD AUTO: 0.75 10*3/MM3 (ref 0.1–0.9)
MONOCYTES NFR BLD AUTO: 13 % (ref 5–12)
NEUTROPHILS # BLD AUTO: 2.89 10*3/MM3 (ref 1.7–7)
NEUTROPHILS NFR BLD AUTO: 50.1 % (ref 42.7–76)
NRBC BLD AUTO-RTO: 0 /100 WBC (ref 0–0.2)
PLATELET # BLD AUTO: 158 10*3/MM3 (ref 140–450)
PMV BLD AUTO: 11.4 FL (ref 6–12)
POTASSIUM BLD-SCNC: 4.1 MMOL/L (ref 3.5–5.2)
PROT SERPL-MCNC: 7.4 G/DL (ref 6–8.5)
RBC # BLD AUTO: 4.96 10*6/MM3 (ref 4.14–5.8)
SODIUM BLD-SCNC: 140 MMOL/L (ref 136–145)
TRIGL SERPL-MCNC: 123 MG/DL (ref 0–150)
VLDLC SERPL-MCNC: 24.6 MG/DL (ref 5–40)
WBC NRBC COR # BLD: 5.78 10*3/MM3 (ref 3.4–10.8)

## 2019-05-09 PROCEDURE — 80053 COMPREHEN METABOLIC PANEL: CPT

## 2019-05-09 PROCEDURE — 36415 COLL VENOUS BLD VENIPUNCTURE: CPT

## 2019-05-09 PROCEDURE — 99214 OFFICE O/P EST MOD 30 MIN: CPT | Performed by: FAMILY MEDICINE

## 2019-05-09 PROCEDURE — 80061 LIPID PANEL: CPT

## 2019-05-09 PROCEDURE — 85025 COMPLETE CBC W/AUTO DIFF WBC: CPT

## 2019-05-09 RX ORDER — METOPROLOL SUCCINATE 50 MG/1
50 TABLET, EXTENDED RELEASE ORAL DAILY
Qty: 90 TABLET | Refills: 1 | Status: SHIPPED | OUTPATIENT
Start: 2019-05-09 | End: 2019-11-03 | Stop reason: SDUPTHER

## 2019-05-09 RX ORDER — ATORVASTATIN CALCIUM 80 MG/1
80 TABLET, FILM COATED ORAL DAILY
Qty: 90 TABLET | Refills: 1 | Status: SHIPPED | OUTPATIENT
Start: 2019-05-09 | End: 2019-11-03 | Stop reason: SDUPTHER

## 2019-05-09 NOTE — PROGRESS NOTES
Subjective   Robinson Quiroz is a 69 y.o. male.    cc:follow up  History of Present Illness The patient comes in for check of their chronic medical issues which include CAD ,Hypertension and Hyperlipidemia.He is at a baseline.   .       The following portions of the patient's history were reviewed and updated as appropriate: allergies, current medications, past family history, past medical history, past social history, past surgical history and problem list.    Review of Systems   Constitutional: Negative for fatigue and fever.   Respiratory: Negative for cough, chest tightness and stridor.    Cardiovascular: Negative for chest pain, palpitations and leg swelling.       Objective   Physical Exam   Constitutional: He appears well-developed and well-nourished.   HENT:   Head: Normocephalic.   Right Ear: External ear normal.   Left Ear: External ear normal.   Nose: Nose normal.   Mouth/Throat: Oropharynx is clear and moist.   Eyes: Conjunctivae are normal.   Neck: Normal range of motion.   Cardiovascular: Normal rate, regular rhythm and normal heart sounds. Exam reveals no gallop and no friction rub.   No murmur heard.  Pulmonary/Chest: Effort normal and breath sounds normal. No stridor. No respiratory distress. He has no wheezes. He has no rales.   Abdominal: Soft. Bowel sounds are normal. He exhibits no distension and no mass. There is no tenderness. There is no guarding.   Neurological: He is alert.   Skin: Skin is warm and dry.   Vitals reviewed.        Assessment/Plan   Robinson was seen today for follow-up.    Diagnoses and all orders for this visit:    Essential hypertension  -     Comprehensive metabolic panel; Future  -     CBC w AUTO Differential; Future    Pure hypercholesterolemia  -     Lipid panel; Future    Coronary artery disease involving native coronary artery of native heart without angina pectoris    Other orders  -     metoprolol succinate XL (TOPROL-XL) 50 MG 24 hr tablet; Take 1 tablet by mouth  Daily.  -     atorvastatin (LIPITOR) 80 MG tablet; Take 1 tablet by mouth Daily.        Return to the clinic in 3 month/s.  Will contact with results as needed.

## 2019-05-10 ENCOUNTER — TELEPHONE (OUTPATIENT)
Dept: FAMILY MEDICINE CLINIC | Facility: CLINIC | Age: 69
End: 2019-05-10

## 2019-05-10 NOTE — PROGRESS NOTES
Per Dr. Gorman, Mr. Quiroz has been called with recent lab results & recommendations.  Continue current medications and follow-up as planned or sooner if any problems.  Copy of labs mailed to him as well.

## 2019-06-03 ENCOUNTER — TELEPHONE (OUTPATIENT)
Dept: FAMILY MEDICINE CLINIC | Facility: CLINIC | Age: 69
End: 2019-06-03

## 2019-06-03 RX ORDER — PREGABALIN 50 MG/1
50 CAPSULE ORAL 2 TIMES DAILY
Qty: 180 CAPSULE | Refills: 1 | Status: SHIPPED | OUTPATIENT
Start: 2019-06-03 | End: 2019-08-09 | Stop reason: SDUPTHER

## 2019-06-03 NOTE — TELEPHONE ENCOUNTER
DR. Gorman,    Mr. Robinson Quiroz is requesting a refill on his Lyrica 50 mg  #180 Take 1 Capsule BID    Last OV 05/09/19    Next Pilar OV 08/09/19    Last Script Written 12/03/18  #180 with 1 refill    Last [facility]  12/03/18      Please Advise on refills    Thank you

## 2019-06-06 ENCOUNTER — TELEPHONE (OUTPATIENT)
Dept: FAMILY MEDICINE CLINIC | Facility: CLINIC | Age: 69
End: 2019-06-06

## 2019-06-06 NOTE — TELEPHONE ENCOUNTER
pregabalin (LYRICA) 50 MG capsule [[id number]]   Order Details   Dose: 50 mg Route: Oral Frequency: 2 Times Daily   Dispense Quantity: 180 capsule Refills: 1 Fills remaining: --           Sig: Take 1 capsule by mouth 2 (Two) Times a Day.          Written Date: 06/03/19 Expiration Date: 11/30/19     Start Date: 06/03/19 End Date: --            Ordering Provider:  Merrick Gorman MD Phone:  [phone] Fax:  [phone]    Address:  [address] [place] [address] [place] [address] NPI:  [id number]            Authorizing Provider:  Merrick Gorman MD Phone:  [phone] Fax:  [phone]    Address:  [address] [place] [address] [place] [address] NPI:  [id number]            Ordering User:  Merrick Gorman MD               Pharmacy:  [facility]/pharmacy #[id number] [address] [phone]  - [phone]  Phone:  [phone] Fax:  [phone]    Address:  [address] [place] [address]      Pharmacy Comments:  --          Fill quantity remaining:  -- Fill quantity used:  --        Orders with any of the following pharmaceutical classes: Anticonvulsant     Name Dose Frequency Start Date End Date Medication Warnings Interventions? Order Mode    pregabalin (LYRICA) 50 MG capsule 50 mg 2 Times Daily 12/03/18 06/03/19   Outpatient    Warnings History     No Interaction Warnings Shown    E-Prescribing Status     Outpatient Medication Detail     pregabalin (LYRICA) 50 MG capsule        Sig: Take 1 capsule by mouth 2 (Two) Times a Day.        Sent to pharmacy as: Pregabalin 50 MG Oral Capsule        Class: Normal        Route: Oral        Date/Time Signed: 6/3/2019 13:37       E-Prescribing Status: Receipt confirmed by pharmacy (6/3/2019  1:37 PM CDT)

## 2019-06-06 NOTE — TELEPHONE ENCOUNTER
Please call [facility] about a Lyrica 50mg script, they need a 90 day supply in order for insurance to cover. Thanks!

## 2019-07-26 ENCOUNTER — TELEPHONE (OUTPATIENT)
Dept: FAMILY MEDICINE CLINIC | Facility: CLINIC | Age: 69
End: 2019-07-26

## 2019-07-26 NOTE — TELEPHONE ENCOUNTER
Patient called and states that he needs a new handicap form filled out for his handicap sticker. Call him at [phone] when ready

## 2019-07-30 RX ORDER — TAMSULOSIN HYDROCHLORIDE 0.4 MG/1
CAPSULE ORAL
Qty: 90 CAPSULE | Refills: 1 | Status: SHIPPED | OUTPATIENT
Start: 2019-07-30 | End: 2020-02-06

## 2019-08-09 ENCOUNTER — OFFICE VISIT (OUTPATIENT)
Dept: FAMILY MEDICINE CLINIC | Facility: CLINIC | Age: 69
End: 2019-08-09

## 2019-08-09 VITALS
SYSTOLIC BLOOD PRESSURE: 118 MMHG | BODY MASS INDEX: 34.34 KG/M2 | OXYGEN SATURATION: 96 % | HEIGHT: 75 IN | HEART RATE: 58 BPM | DIASTOLIC BLOOD PRESSURE: 54 MMHG | WEIGHT: 276.19 LBS

## 2019-08-09 DIAGNOSIS — G62.9 NEUROPATHY: ICD-10-CM

## 2019-08-09 DIAGNOSIS — E78.00 PURE HYPERCHOLESTEROLEMIA: ICD-10-CM

## 2019-08-09 DIAGNOSIS — I10 ESSENTIAL HYPERTENSION: Primary | ICD-10-CM

## 2019-08-09 PROCEDURE — 99214 OFFICE O/P EST MOD 30 MIN: CPT | Performed by: FAMILY MEDICINE

## 2019-08-09 RX ORDER — PREGABALIN 50 MG/1
50 CAPSULE ORAL 2 TIMES DAILY
Qty: 180 CAPSULE | Refills: 1 | Status: SHIPPED | OUTPATIENT
Start: 2019-08-09 | End: 2019-11-25 | Stop reason: SDUPTHER

## 2019-08-09 NOTE — PROGRESS NOTES
Subjective   Robinson Quiroz is a 69 y.o. male.    cc:follow up  History of Present Illness The patient comes in for check of their chronic medical issues which include Hypertension and Hypercholesterolemia. His Neuropathy is at it'w base line.  .       The following portions of the patient's history were reviewed and updated as appropriate: allergies, current medications, past family history, past medical history, past social history, past surgical history and problem list.    Review of Systems   Constitutional: Negative for fatigue and fever.   Respiratory: Negative for cough, chest tightness and stridor.    Cardiovascular: Negative for chest pain, palpitations and leg swelling.       Objective   Physical Exam   Constitutional: He is oriented to person, place, and time. He appears well-developed and well-nourished.   HENT:   Head: Normocephalic and atraumatic.   Right Ear: External ear normal.   Left Ear: External ear normal.   Nose: Nose normal.   Mouth/Throat: Oropharynx is clear and moist.   Eyes: Conjunctivae are normal.   Neck: Normal range of motion.   Cardiovascular: Normal rate, regular rhythm and normal heart sounds. Exam reveals no gallop and no friction rub.   No murmur heard.  Pulmonary/Chest: Effort normal and breath sounds normal. No stridor. No respiratory distress. He has no wheezes. He has no rales.   Abdominal: Soft. Bowel sounds are normal. He exhibits no distension and no mass. There is no tenderness. There is no guarding.   Musculoskeletal:   The left knee is minimally tender.    Neurological: He is alert and oriented to person, place, and time.   Skin: Skin is warm and dry.   Vitals reviewed.        Assessment/Plan   Robinson was seen today for follow-up and hypertension.    Diagnoses and all orders for this visit:    Essential hypertension  -     Comprehensive metabolic panel; Future  -     CBC w AUTO Differential; Future    Pure hypercholesterolemia  -     Lipid panel;  Future    Neuropathy    Other orders  -     pregabalin (LYRICA) 50 MG capsule; Take 1 capsule by mouth 2 (Two) Times a Day.    Return to the clinic in 3 month/s.  Will contact with results as needed.

## 2019-08-10 ENCOUNTER — LAB (OUTPATIENT)
Dept: LAB | Facility: HOSPITAL | Age: 69
End: 2019-08-10

## 2019-08-10 DIAGNOSIS — I10 ESSENTIAL HYPERTENSION: ICD-10-CM

## 2019-08-10 DIAGNOSIS — E78.00 PURE HYPERCHOLESTEROLEMIA: ICD-10-CM

## 2019-08-10 LAB
ALBUMIN SERPL-MCNC: 4.4 G/DL (ref 3.5–5.2)
ALBUMIN/GLOB SERPL: 1.8 G/DL
ALP SERPL-CCNC: 74 U/L (ref 39–117)
ALT SERPL W P-5'-P-CCNC: 79 U/L (ref 1–41)
ANION GAP SERPL CALCULATED.3IONS-SCNC: 11.8 MMOL/L (ref 5–15)
AST SERPL-CCNC: 56 U/L (ref 1–40)
BASOPHILS # BLD AUTO: 0.07 10*3/MM3 (ref 0–0.2)
BASOPHILS NFR BLD AUTO: 1.4 % (ref 0–1.5)
BILIRUB SERPL-MCNC: 0.7 MG/DL (ref 0.2–1.2)
BUN BLD-MCNC: 11 MG/DL (ref 8–23)
BUN/CREAT SERPL: 13.3 (ref 7–25)
CALCIUM SPEC-SCNC: 9.4 MG/DL (ref 8.6–10.5)
CHLORIDE SERPL-SCNC: 100 MMOL/L (ref 98–107)
CHOLEST SERPL-MCNC: 99 MG/DL (ref 0–200)
CO2 SERPL-SCNC: 26.2 MMOL/L (ref 22–29)
CREAT BLD-MCNC: 0.83 MG/DL (ref 0.76–1.27)
DEPRECATED RDW RBC AUTO: 39.9 FL (ref 37–54)
EOSINOPHIL # BLD AUTO: 0.31 10*3/MM3 (ref 0–0.4)
EOSINOPHIL NFR BLD AUTO: 6.1 % (ref 0.3–6.2)
ERYTHROCYTE [DISTWIDTH] IN BLOOD BY AUTOMATED COUNT: 12.2 % (ref 12.3–15.4)
GFR SERPL CREATININE-BSD FRML MDRD: 92 ML/MIN/1.73
GLOBULIN UR ELPH-MCNC: 2.5 GM/DL
GLUCOSE BLD-MCNC: 101 MG/DL (ref 65–99)
HCT VFR BLD AUTO: 42 % (ref 37.5–51)
HDLC SERPL-MCNC: 43 MG/DL (ref 40–60)
HGB BLD-MCNC: 14.3 G/DL (ref 13–17.7)
LDLC SERPL CALC-MCNC: 33 MG/DL (ref 0–100)
LDLC/HDLC SERPL: 0.76 {RATIO}
LYMPHOCYTES # BLD AUTO: 1.62 10*3/MM3 (ref 0.7–3.1)
LYMPHOCYTES NFR BLD AUTO: 32 % (ref 19.6–45.3)
MCH RBC QN AUTO: 30.8 PG (ref 26.6–33)
MCHC RBC AUTO-ENTMCNC: 34 G/DL (ref 31.5–35.7)
MCV RBC AUTO: 90.3 FL (ref 79–97)
MONOCYTES # BLD AUTO: 0.72 10*3/MM3 (ref 0.1–0.9)
MONOCYTES NFR BLD AUTO: 14.2 % (ref 5–12)
NEUTROPHILS # BLD AUTO: 2.34 10*3/MM3 (ref 1.7–7)
NEUTROPHILS NFR BLD AUTO: 46.1 % (ref 42.7–76)
PLATELET # BLD AUTO: 139 10*3/MM3 (ref 140–450)
PMV BLD AUTO: 11.6 FL (ref 6–12)
POTASSIUM BLD-SCNC: 4.3 MMOL/L (ref 3.5–5.2)
PROT SERPL-MCNC: 6.9 G/DL (ref 6–8.5)
RBC # BLD AUTO: 4.65 10*6/MM3 (ref 4.14–5.8)
SODIUM BLD-SCNC: 138 MMOL/L (ref 136–145)
TRIGL SERPL-MCNC: 116 MG/DL (ref 0–150)
VLDLC SERPL-MCNC: 23.2 MG/DL (ref 5–40)
WBC NRBC COR # BLD: 5.07 10*3/MM3 (ref 3.4–10.8)

## 2019-08-10 PROCEDURE — 36415 COLL VENOUS BLD VENIPUNCTURE: CPT

## 2019-08-10 PROCEDURE — 80061 LIPID PANEL: CPT

## 2019-08-10 PROCEDURE — 85025 COMPLETE CBC W/AUTO DIFF WBC: CPT

## 2019-08-10 PROCEDURE — 80053 COMPREHEN METABOLIC PANEL: CPT

## 2019-10-24 DIAGNOSIS — I25.10 CORONARY ARTERY DISEASE INVOLVING NATIVE CORONARY ARTERY OF NATIVE HEART WITHOUT ANGINA PECTORIS: Primary | ICD-10-CM

## 2019-10-25 ENCOUNTER — OFFICE VISIT (OUTPATIENT)
Dept: CARDIOLOGY | Facility: CLINIC | Age: 69
End: 2019-10-25

## 2019-10-25 VITALS
DIASTOLIC BLOOD PRESSURE: 72 MMHG | BODY MASS INDEX: 33.57 KG/M2 | OXYGEN SATURATION: 96 % | HEIGHT: 75 IN | SYSTOLIC BLOOD PRESSURE: 128 MMHG | HEART RATE: 60 BPM | WEIGHT: 270 LBS

## 2019-10-25 DIAGNOSIS — I25.810 CORONARY ARTERY DISEASE INVOLVING CORONARY BYPASS GRAFT OF NATIVE HEART WITHOUT ANGINA PECTORIS: Primary | ICD-10-CM

## 2019-10-25 PROCEDURE — 93000 ELECTROCARDIOGRAM COMPLETE: CPT | Performed by: INTERNAL MEDICINE

## 2019-10-25 PROCEDURE — 99214 OFFICE O/P EST MOD 30 MIN: CPT | Performed by: INTERNAL MEDICINE

## 2019-10-25 NOTE — PROGRESS NOTES
[facility]  OFFICE NOTE    Cardiovascular Medicine  Manuel Alcaraz M.D., RPVI         No referring provider defined for this encounter.    Thank you for asking me to see Robinson Quiroz for CAD.    History of Present Illness  This is a 69 y.o. male with:    1. Coronary artery disease involving native coronary artery of native heart without angina pectoris    2. Pure hypercholesterolemia    3. Essential hypertension          Chief complaint -Follow-up CAD        History of present Illness-68 year old gentleman with history of coronary disease status post quadruple bypass in 2004,   has coronary angiogram from 2012 which showed a patent LIMA to LAD, SVG to diagonal and is filling the LAD as well, SVG to circumflex looks to be occluded SVG to RPDA was patent.  He had significant disease in his native circumflex so underwent PCI of the mid circumflex and into and OM. had a abdominal aortic aneurysm screening last year and was okay.  He had a nuclear stress test in January 2017 which showed no ischemia EF was normal.  He is doing well no chest pain or shortness of breath is on optimal medical therapy with beta blockers and angiotensin receptor blockers and his blood pressure is well controlled.  His BMI is high talked to him about diet and exercise.  Denies any GI symptoms will CNS symptoms.   He had one episode of chest pain several weeks ago, which is sharp in character lasted for 1 minute, was nonexertional and resolved on its own.  Is not recurred.  He is able to exercise on a treadmill for 30 minutes without any chest pain or shortness of breath.        Review of Systems - ROS  Constitution: Negative for weakness, weight gain and weight loss.   HENT: Negative for congestion.    Eyes: Negative for blurred vision.   Cardiovascular: As mentioned above  Respiratory: Negative for cough and hemoptysis.    Endocrine: Negative for polydipsia and polyuria.   Hematologic/Lymphatic: Negative for  bleeding problem. Does not bruise/bleed easily.   Skin: Negative for flushing.   Musculoskeletal: Negative for neck pain and stiffness.   Gastrointestinal: Negative for abdominal pain, diarrhea, jaundice, melena, nausea and vomiting.   Genitourinary: Negative for dysuria and hematuria.   Neurological: Negative for dizziness, focal weakness and numbness.   Psychiatric/Behavioral: Negative for altered mental status and depression.          All other systems were reviewed and were negative.    family history includes Alcohol abuse in his paternal uncle; Arthritis in his maternal aunt, maternal grandfather, maternal grandmother, and maternal uncle; Cancer in his mother; Diabetes in his mother; Early death in his mother; Hearing loss in his daughter and mother; Heart disease in his father; Hyperlipidemia in his mother; Liver disease in his mother; Pancreatic cancer in his mother; Stroke in his father.     reports that he quit smoking about 16 years ago. His smoking use included cigarettes. He has never used smokeless tobacco. He reports that he does not drink alcohol or use drugs.    Allergies   Allergen Reactions   • Penicillins Anaphylaxis   • Latex Rash         Current Outpatient Medications:   •  atorvastatin (LIPITOR) 80 MG tablet, Take 1 tablet by mouth Daily., Disp: 90 tablet, Rfl: 1  •  b complex-C-folic acid 1 MG capsule, Take 1 capsule by mouth Daily., Disp: , Rfl:   •  losartan-hydrochlorothiazide (HYZAAR) 50-12.5 MG per tablet, Take 1 tablet by mouth Daily., Disp: 90 tablet, Rfl: 1  •  metoprolol succinate XL (TOPROL-XL) 50 MG 24 hr tablet, Take 1 tablet by mouth Daily., Disp: 90 tablet, Rfl: 1  •  Multiple Vitamins-Minerals (MULTIVITAMIN ADULTS 50+ PO), Take 1 tablet by mouth., Disp: , Rfl:   •  Omega-3 Fatty Acids (FISH OIL) 1000 MG capsule capsule, Take 1,200 mg by mouth 2 (Two) Times a Day With Meals., Disp: , Rfl:   •  pregabalin (LYRICA) 50 MG capsule, Take 1 capsule by mouth 2 (Two) Times a Day., Disp:  "180 capsule, Rfl: 1  •  tamsulosin (FLOMAX) 0.4 MG capsule 24 hr capsule, TAKE 1 CAPSULE BY MOUTH EVERY DAY, Disp: 90 capsule, Rfl: 1  •  aspirin 81 MG tablet, Take 1 tablet by mouth Daily., Disp: 30 tablet, Rfl: 11    Physical Exam:  Vitals:    10/25/19 0936   BP: 128/72   BP Location: Right arm   Patient Position: Sitting   Pulse: 60   SpO2: 96%   Weight: 122 kg (270 lb)   Height: 190.5 cm (75\")   PainSc: 0-No pain     Current Pain Level: none  Pulse Ox: Normal  on room air  General: alert, appears stated age and cooperative     Body Habitus: well-nourished    HEENT: Head: Normocephalic, no lesions, without obvious abnormality. No arcus senilis, xanthelasma or xanthomas.    Neuro: alert, oriented x3  Pulses: 2+ and symmetric  JVP: Volume/Pulsation: Normal.  Normal waveforms.   Appropriate inspiratory decrease.  No Kussmaul's. No Geno's.   Carotid Exam: no bruit normal pulsation bilaterally   Carotid Volume: normal.     Respirations: no increased work of breathing   Chest:  Normal    Pulmonary:Normal   Precordium: Normal impulses. P2 is not palpable.  RV Heave: absent  LV Heave: absent  Clare:  normal size and placement  Palpable S4: absent.  Heart rate: normal    Heart Rhythm: regular     Heart Sounds: S1: normal  S2: normal  S3: absent   S4: absent  Opening Snap: absent    Pericardial Rub:  Absent: .    Abdomen:   Appearance: normal .  Palpation: Soft, non-tender to palpation, bowel sounds positive in all four quadrants; no guarding or rebound tenderness  Extremity: no edema.   LE Skin: no rashes  LE Hair:  normal  LE Pulses: well perfused with normal pulses in the distal extremities  Pallor on elevation: Absent. Rubor on dependency: None      DATA REVIEWED:     EKG. I personally reviewed and interpreted the EKG.  Sinus bradycardia first-degree AV block.  Right bundle branch block.    ECG/EMG Results (all)     None        ---------------------------------------------------  TTE/ASHLYN:   "     --------------------------------------------------------------------------------------------------  LABS:     The CVD Risk score (Ela et al., 2008) failed to calculate for the following reasons:    The patient has a prior MI, stroke, CHF, or peripheral vascular disease diagnosis         Lab Results   Component Value Date    GLUCOSE 101 (H) 08/10/2019    BUN 11 08/10/2019    CREATININE 0.83 08/10/2019    EGFRIFNONA 92 08/10/2019    BCR 13.3 08/10/2019    K 4.3 08/10/2019    CO2 26.2 08/10/2019    CALCIUM 9.4 08/10/2019    ALBUMIN 4.40 08/10/2019    AST 56 (H) 08/10/2019    ALT 79 (H) 08/10/2019     Lab Results   Component Value Date    WBC 5.07 08/10/2019    HGB 14.3 08/10/2019    HCT 42.0 08/10/2019    MCV 90.3 08/10/2019     (L) 08/10/2019     Lab Results   Component Value Date    CHOL 99 08/10/2019    CHLPL 123 01/29/2016    TRIG 116 08/10/2019    HDL 43 08/10/2019    LDL 33 08/10/2019     Lab Results   Component Value Date    TSH 2.890 08/02/2018     Lab Results   Component Value Date    CKTOTAL 190 (H) 08/02/2018     Lab Results   Component Value Date    HGBA1C 5.6 01/29/2016     No results found for: DDIMER  Lab Results   Component Value Date    ALT 79 (H) 08/10/2019     Lab Results   Component Value Date    HGBA1C 5.6 01/29/2016     Lab Results   Component Value Date    CREATININE 0.83 08/10/2019     Lab Results   Component Value Date    IRON 79 03/13/2014    TIBC 320 03/13/2014     No results found for: INR, PROTIME    Assessment/Plan   CAD status post CABG-history of stent placement to circumflex proximal artery in 2013 asked to take a regular aspirin and he had a stress test in January 2017 which was unremarkable we'll continue medical therapy.  Get an echocardiogram for assessment of systolic function.     Hypertension continue the Toprol in the evening and Hyzaar in the morning.     Hyperlipidemia on atorvastatin doing okay.  And he was taken a regular aspirin every day.     Follow-up  in 9 months.      Prevention:  Patient's Body mass index is 33.75 kg/m². BMI is above normal parameters. Recommendations include: exercise counseling and nutrition counseling.      Robinson Quiroz is not a smoker    AAA Screening:   Not needed    Return in about 9 months (around 7/25/2020).          This document has been electronically signed by Manuel Alcaraz MD on October 25, 2019 9:52 AM

## 2019-11-04 ENCOUNTER — LAB (OUTPATIENT)
Dept: LAB | Facility: HOSPITAL | Age: 69
End: 2019-11-04

## 2019-11-04 ENCOUNTER — OFFICE VISIT (OUTPATIENT)
Dept: FAMILY MEDICINE CLINIC | Facility: CLINIC | Age: 69
End: 2019-11-04

## 2019-11-04 VITALS
BODY MASS INDEX: 34.74 KG/M2 | HEART RATE: 56 BPM | SYSTOLIC BLOOD PRESSURE: 144 MMHG | HEIGHT: 75 IN | DIASTOLIC BLOOD PRESSURE: 80 MMHG | WEIGHT: 279.44 LBS | OXYGEN SATURATION: 94 %

## 2019-11-04 DIAGNOSIS — G89.29 CHRONIC MIDLINE LOW BACK PAIN, UNSPECIFIED WHETHER SCIATICA PRESENT: Primary | ICD-10-CM

## 2019-11-04 DIAGNOSIS — G62.9 NEUROPATHY: ICD-10-CM

## 2019-11-04 DIAGNOSIS — M54.50 CHRONIC MIDLINE LOW BACK PAIN, UNSPECIFIED WHETHER SCIATICA PRESENT: Primary | ICD-10-CM

## 2019-11-04 PROCEDURE — 99214 OFFICE O/P EST MOD 30 MIN: CPT | Performed by: FAMILY MEDICINE

## 2019-11-04 PROCEDURE — 36415 COLL VENOUS BLD VENIPUNCTURE: CPT

## 2019-11-04 PROCEDURE — 80053 COMPREHEN METABOLIC PANEL: CPT

## 2019-11-04 PROCEDURE — 96372 THER/PROPH/DIAG INJ SC/IM: CPT | Performed by: FAMILY MEDICINE

## 2019-11-04 RX ORDER — ATORVASTATIN CALCIUM 80 MG/1
TABLET, FILM COATED ORAL
Qty: 90 TABLET | Refills: 1 | Status: SHIPPED | OUTPATIENT
Start: 2019-11-04 | End: 2020-05-13 | Stop reason: SDUPTHER

## 2019-11-04 RX ORDER — TRIAMCINOLONE ACETONIDE 40 MG/ML
80 INJECTION, SUSPENSION INTRA-ARTICULAR; INTRAMUSCULAR ONCE
Status: COMPLETED | OUTPATIENT
Start: 2019-11-04 | End: 2019-11-04

## 2019-11-04 RX ORDER — METOPROLOL SUCCINATE 50 MG/1
TABLET, EXTENDED RELEASE ORAL
Qty: 90 TABLET | Refills: 1 | Status: SHIPPED | OUTPATIENT
Start: 2019-11-04 | End: 2020-05-13 | Stop reason: SDUPTHER

## 2019-11-04 RX ORDER — METHOCARBAMOL 500 MG/1
500 TABLET, FILM COATED ORAL 4 TIMES DAILY
Qty: 56 TABLET | Refills: 1 | Status: SHIPPED | OUTPATIENT
Start: 2019-11-04 | End: 2020-01-15

## 2019-11-04 RX ADMIN — TRIAMCINOLONE ACETONIDE 80 MG: 40 INJECTION, SUSPENSION INTRA-ARTICULAR; INTRAMUSCULAR at 15:53

## 2019-11-04 NOTE — PROGRESS NOTES
Subjective   Robinson Quiroz is a 69 y.o. male.    cc: low back pain  History of Present Illness   The patient comes in to have his back evaluated. He has been having pain in his low back. The pain is accompanied with burning in his feet.  The following portions of the patient's history were reviewed and updated as appropriate: allergies, current medications, past family history, past medical history, past social history, past surgical history and problem list.    Review of Systems   Constitutional: Negative for fatigue and fever.   Respiratory: Negative for cough, chest tightness and stridor.    Cardiovascular: Negative for chest pain and palpitations.   Musculoskeletal: Positive for back pain.       Objective   Physical Exam   Constitutional: He appears well-developed and well-nourished.   HENT:   Head: Normocephalic and atraumatic.   Right Ear: External ear normal.   Left Ear: External ear normal.   Nose: Nose normal.   Mouth/Throat: Oropharynx is clear and moist.   Eyes: Conjunctivae are normal.   Neck: Normal range of motion.   Cardiovascular: Normal rate, regular rhythm and normal heart sounds. Exam reveals no gallop and no friction rub.   No murmur heard.  Pulmonary/Chest: Effort normal and breath sounds normal. No stridor. No respiratory distress. He has no wheezes. He has no rales.   Abdominal: Soft. Bowel sounds are normal. He exhibits no distension. There is no tenderness.   Musculoskeletal:   The low back is tender to palpation. He has paraspinal spasms in the Lumbar area.   Skin: Skin is warm and dry.   Vitals reviewed.        Assessment/Plan   Robinson was seen today for back pain.    Diagnoses and all orders for this visit:    Chronic midline low back pain, unspecified whether sciatica present  -     XR Spine Lumbar 4+ View; Future  -     triamcinolone acetonide (KENALOG-40) injection 80 mg    Neuropathy  -     Comprehensive metabolic panel; Future    Other orders  -     methocarbamol (ROBAXIN) 500 MG  tablet; Take 1 tablet by mouth 4 (Four) Times a Day.    Continue with Pregabalin.  Return to the clinic in 1 week/s.  Will contact with results as needed.  1

## 2019-11-05 LAB
ALBUMIN SERPL-MCNC: 4.2 G/DL (ref 3.5–5.2)
ALBUMIN/GLOB SERPL: 1.4 G/DL
ALP SERPL-CCNC: 105 U/L (ref 39–117)
ALT SERPL W P-5'-P-CCNC: 73 U/L (ref 1–41)
ANION GAP SERPL CALCULATED.3IONS-SCNC: 12.9 MMOL/L (ref 5–15)
AST SERPL-CCNC: 52 U/L (ref 1–40)
BILIRUB SERPL-MCNC: 0.4 MG/DL (ref 0.2–1.2)
BUN BLD-MCNC: 12 MG/DL (ref 8–23)
BUN/CREAT SERPL: 14.6 (ref 7–25)
CALCIUM SPEC-SCNC: 9.4 MG/DL (ref 8.6–10.5)
CHLORIDE SERPL-SCNC: 102 MMOL/L (ref 98–107)
CO2 SERPL-SCNC: 28.1 MMOL/L (ref 22–29)
CREAT BLD-MCNC: 0.82 MG/DL (ref 0.76–1.27)
GFR SERPL CREATININE-BSD FRML MDRD: 93 ML/MIN/1.73
GLOBULIN UR ELPH-MCNC: 3 GM/DL
GLUCOSE BLD-MCNC: 78 MG/DL (ref 65–99)
POTASSIUM BLD-SCNC: 3.8 MMOL/L (ref 3.5–5.2)
PROT SERPL-MCNC: 7.2 G/DL (ref 6–8.5)
SODIUM BLD-SCNC: 143 MMOL/L (ref 136–145)

## 2019-11-11 ENCOUNTER — OFFICE VISIT (OUTPATIENT)
Dept: FAMILY MEDICINE CLINIC | Facility: CLINIC | Age: 69
End: 2019-11-11

## 2019-11-11 VITALS
DIASTOLIC BLOOD PRESSURE: 76 MMHG | WEIGHT: 273.25 LBS | BODY MASS INDEX: 33.97 KG/M2 | HEIGHT: 75 IN | HEART RATE: 60 BPM | SYSTOLIC BLOOD PRESSURE: 138 MMHG | OXYGEN SATURATION: 95 %

## 2019-11-11 DIAGNOSIS — M15.9 OSTEOARTHRITIS OF MULTIPLE JOINTS, UNSPECIFIED OSTEOARTHRITIS TYPE: ICD-10-CM

## 2019-11-11 DIAGNOSIS — Z23 NEEDS FLU SHOT: Primary | ICD-10-CM

## 2019-11-11 DIAGNOSIS — M19.90 OSTEOARTHRITIS, UNSPECIFIED OSTEOARTHRITIS TYPE, UNSPECIFIED SITE: ICD-10-CM

## 2019-11-11 PROCEDURE — 99213 OFFICE O/P EST LOW 20 MIN: CPT | Performed by: FAMILY MEDICINE

## 2019-11-11 PROCEDURE — G0008 ADMIN INFLUENZA VIRUS VAC: HCPCS | Performed by: FAMILY MEDICINE

## 2019-11-11 PROCEDURE — 90653 IIV ADJUVANT VACCINE IM: CPT | Performed by: FAMILY MEDICINE

## 2019-11-11 NOTE — PROGRESS NOTES
Subjective   Robinson Quiroz is a 69 y.o. male.     Cc:follow up of his low back pain.    History of Present Illness THe patient comes infor follow  Up of his back pain. The steroid injection helped with the pain.  He also needs  His flu shot.    The following portions of the patient's history were reviewed and updated as appropriate: allergies, current medications, past family history, past medical history, past social history, past surgical history and problem list.    Review of Systems   Constitutional: Negative for fatigue and fever.   Musculoskeletal: Negative for back pain.       Objective   Physical Exam   Constitutional: He appears well-developed and well-nourished.   HENT:   Head: Normocephalic and atraumatic.   Eyes: Conjunctivae are normal.   Cardiovascular: Normal rate, regular rhythm and normal heart sounds. Exam reveals no gallop and no friction rub.   No murmur heard.  Pulmonary/Chest: Effort normal and breath sounds normal. No stridor. No respiratory distress. He has no wheezes. He has no rales.   Abdominal: Soft. Bowel sounds are normal.   Musculoskeletal:   The low back is non tender.   Vitals reviewed.        Assessment/Plan   Robinson was seen today for follow-up and hypertension.    Diagnoses and all orders for this visit:    Osteoarthritis of multiple joints, unspecified osteoarthritis type    Osteoarthritis, unspecified osteoarthritis type, unspecified site        Follow up as needed.

## 2019-11-25 ENCOUNTER — TELEPHONE (OUTPATIENT)
Dept: FAMILY MEDICINE CLINIC | Facility: CLINIC | Age: 69
End: 2019-11-25

## 2019-11-25 LAB
BH CV ECHO MEAS - ACS: 2.1 CM
BH CV ECHO MEAS - AO ISTHMUS: 2.6 CM
BH CV ECHO MEAS - AO MAX PG (FULL): 3.1 MMHG
BH CV ECHO MEAS - AO MAX PG: 7.3 MMHG
BH CV ECHO MEAS - AO MEAN PG (FULL): 1 MMHG
BH CV ECHO MEAS - AO MEAN PG: 3 MMHG
BH CV ECHO MEAS - AO ROOT AREA (BSA CORRECTED): 1.1
BH CV ECHO MEAS - AO ROOT AREA: 6.2 CM^2
BH CV ECHO MEAS - AO ROOT DIAM: 2.8 CM
BH CV ECHO MEAS - AO V2 MAX: 135 CM/SEC
BH CV ECHO MEAS - AO V2 MEAN: 84.3 CM/SEC
BH CV ECHO MEAS - AO V2 VTI: 32.6 CM
BH CV ECHO MEAS - ASC AORTA: 3.3 CM
BH CV ECHO MEAS - AVA(I,A): 2.5 CM^2
BH CV ECHO MEAS - AVA(I,D): 2.5 CM^2
BH CV ECHO MEAS - AVA(V,A): 2.4 CM^2
BH CV ECHO MEAS - AVA(V,D): 2.4 CM^2
BH CV ECHO MEAS - BSA(HAYCOCK): 2.6 M^2
BH CV ECHO MEAS - BSA: 2.5 M^2
BH CV ECHO MEAS - BZI_BMI: 34.1 KILOGRAMS/M^2
BH CV ECHO MEAS - BZI_METRIC_HEIGHT: 190.5 CM
BH CV ECHO MEAS - BZI_METRIC_WEIGHT: 123.8 KG
BH CV ECHO MEAS - EDV(CUBED): 56.6 ML
BH CV ECHO MEAS - EDV(MOD-SP2): 130 ML
BH CV ECHO MEAS - EDV(MOD-SP4): 101 ML
BH CV ECHO MEAS - EDV(TEICH): 63.5 ML
BH CV ECHO MEAS - EF(CUBED): 64.9 %
BH CV ECHO MEAS - EF(MOD-SP2): 66.6 %
BH CV ECHO MEAS - EF(MOD-SP4): 69.8 %
BH CV ECHO MEAS - EF(TEICH): 57.1 %
BH CV ECHO MEAS - EPSS: 0.6 CM
BH CV ECHO MEAS - ESV(CUBED): 19.9 ML
BH CV ECHO MEAS - ESV(MOD-SP2): 43.4 ML
BH CV ECHO MEAS - ESV(MOD-SP4): 30.5 ML
BH CV ECHO MEAS - ESV(TEICH): 27.3 ML
BH CV ECHO MEAS - FS: 29.4 %
BH CV ECHO MEAS - IVS/LVPW: 1
BH CV ECHO MEAS - IVSD: 1.1 CM
BH CV ECHO MEAS - LA DIMENSION: 4.5 CM
BH CV ECHO MEAS - LA/AO: 1.6
BH CV ECHO MEAS - LV DIASTOLIC VOL/BSA (35-75): 40.3 ML/M^2
BH CV ECHO MEAS - LV MASS(C)D: 128.7 GRAMS
BH CV ECHO MEAS - LV MASS(C)DI: 51.4 GRAMS/M^2
BH CV ECHO MEAS - LV MAX PG: 4.2 MMHG
BH CV ECHO MEAS - LV MEAN PG: 2 MMHG
BH CV ECHO MEAS - LV SYSTOLIC VOL/BSA (12-30): 12.2 ML/M^2
BH CV ECHO MEAS - LV V1 MAX: 102 CM/SEC
BH CV ECHO MEAS - LV V1 MEAN: 69.1 CM/SEC
BH CV ECHO MEAS - LV V1 VTI: 25.7 CM
BH CV ECHO MEAS - LVIDD: 3.8 CM
BH CV ECHO MEAS - LVIDS: 2.7 CM
BH CV ECHO MEAS - LVLD AP2: 7.9 CM
BH CV ECHO MEAS - LVLD AP4: 8.4 CM
BH CV ECHO MEAS - LVLS AP2: 7.2 CM
BH CV ECHO MEAS - LVLS AP4: 6.3 CM
BH CV ECHO MEAS - LVOT AREA (M): 3.1 CM^2
BH CV ECHO MEAS - LVOT AREA: 3.1 CM^2
BH CV ECHO MEAS - LVOT DIAM: 2 CM
BH CV ECHO MEAS - LVPWD: 1 CM
BH CV ECHO MEAS - MV A MAX VEL: 84 CM/SEC
BH CV ECHO MEAS - MV DEC SLOPE: 247 CM/SEC^2
BH CV ECHO MEAS - MV E MAX VEL: 62.6 CM/SEC
BH CV ECHO MEAS - MV E/A: 0.75
BH CV ECHO MEAS - MV MAX PG: 3.4 MMHG
BH CV ECHO MEAS - MV MEAN PG: 1 MMHG
BH CV ECHO MEAS - MV P1/2T MAX VEL: 63.9 CM/SEC
BH CV ECHO MEAS - MV P1/2T: 75.8 MSEC
BH CV ECHO MEAS - MV V2 MAX: 91.8 CM/SEC
BH CV ECHO MEAS - MV V2 MEAN: 47.3 CM/SEC
BH CV ECHO MEAS - MV V2 VTI: 28 CM
BH CV ECHO MEAS - MVA P1/2T LCG: 3.4 CM^2
BH CV ECHO MEAS - MVA(P1/2T): 2.9 CM^2
BH CV ECHO MEAS - MVA(VTI): 2.9 CM^2
BH CV ECHO MEAS - PA MAX PG: 7.4 MMHG
BH CV ECHO MEAS - PA MEAN PG: 3 MMHG
BH CV ECHO MEAS - PA V2 MAX: 136 CM/SEC
BH CV ECHO MEAS - PA V2 MEAN: 82.1 CM/SEC
BH CV ECHO MEAS - PA V2 VTI: 28 CM
BH CV ECHO MEAS - RAP SYSTOLE: 3 MMHG
BH CV ECHO MEAS - RVSP: 25.3 MMHG
BH CV ECHO MEAS - SI(AO): 80.1 ML/M^2
BH CV ECHO MEAS - SI(CUBED): 14.7 ML/M^2
BH CV ECHO MEAS - SI(LVOT): 32.2 ML/M^2
BH CV ECHO MEAS - SI(MOD-SP2): 34.6 ML/M^2
BH CV ECHO MEAS - SI(MOD-SP4): 28.1 ML/M^2
BH CV ECHO MEAS - SI(TEICH): 14.5 ML/M^2
BH CV ECHO MEAS - SV(AO): 200.7 ML
BH CV ECHO MEAS - SV(CUBED): 36.7 ML
BH CV ECHO MEAS - SV(LVOT): 80.7 ML
BH CV ECHO MEAS - SV(MOD-SP2): 86.6 ML
BH CV ECHO MEAS - SV(MOD-SP4): 70.5 ML
BH CV ECHO MEAS - SV(TEICH): 36.3 ML
BH CV ECHO MEAS - TR MAX VEL: 236 CM/SEC
BH CV VAS BP LEFT ARM: NORMAL MMHG

## 2019-11-25 RX ORDER — PREGABALIN 50 MG/1
50 CAPSULE ORAL 2 TIMES DAILY
Qty: 180 CAPSULE | Refills: 1 | Status: SHIPPED | OUTPATIENT
Start: 2019-11-25 | End: 2019-11-27 | Stop reason: SDUPTHER

## 2019-11-27 ENCOUNTER — TELEPHONE (OUTPATIENT)
Dept: CARDIOLOGY | Facility: CLINIC | Age: 69
End: 2019-11-27

## 2019-11-27 ENCOUNTER — TELEPHONE (OUTPATIENT)
Dept: FAMILY MEDICINE CLINIC | Facility: CLINIC | Age: 69
End: 2019-11-27

## 2019-11-27 RX ORDER — PREGABALIN 50 MG/1
50 CAPSULE ORAL 2 TIMES DAILY
Qty: 180 CAPSULE | Refills: 1 | Status: SHIPPED | OUTPATIENT
Start: 2019-11-27 | End: 2020-05-13 | Stop reason: SDUPTHER

## 2019-11-27 NOTE — TELEPHONE ENCOUNTER
Called patient with results, patient voiced understanding         ----- Message from Michelle Anders RN sent at 11/27/2019  3:40 PM CST -----  Echo ok per Dr Alcaraz

## 2019-12-03 ENCOUNTER — OFFICE VISIT (OUTPATIENT)
Dept: FAMILY MEDICINE CLINIC | Facility: CLINIC | Age: 69
End: 2019-12-03

## 2019-12-03 VITALS
WEIGHT: 269.19 LBS | OXYGEN SATURATION: 97 % | SYSTOLIC BLOOD PRESSURE: 152 MMHG | HEART RATE: 61 BPM | HEIGHT: 75 IN | BODY MASS INDEX: 33.47 KG/M2 | DIASTOLIC BLOOD PRESSURE: 78 MMHG

## 2019-12-03 DIAGNOSIS — R21 RASH: ICD-10-CM

## 2019-12-03 DIAGNOSIS — I25.10 CORONARY ARTERY DISEASE INVOLVING NATIVE CORONARY ARTERY OF NATIVE HEART WITHOUT ANGINA PECTORIS: Primary | ICD-10-CM

## 2019-12-03 DIAGNOSIS — E78.00 PURE HYPERCHOLESTEROLEMIA: ICD-10-CM

## 2019-12-03 DIAGNOSIS — I10 ESSENTIAL HYPERTENSION: ICD-10-CM

## 2019-12-03 PROCEDURE — 99214 OFFICE O/P EST MOD 30 MIN: CPT | Performed by: FAMILY MEDICINE

## 2019-12-03 RX ORDER — FLUCONAZOLE 150 MG/1
150 TABLET ORAL DAILY
Qty: 5 TABLET | Refills: 0 | Status: SHIPPED | OUTPATIENT
Start: 2019-12-03 | End: 2020-01-15

## 2019-12-03 NOTE — PROGRESS NOTES
"Subjective   Robinson Quiroz is a 69 y.o. male.   Cc: follow up of chronic medical issues  HPI The patient comes in for check of their chronic medical issues which include CAD,Hypertension and Hypercholesterolemia. He is at his base line. He has a rash in his groin. It itches..       The following portions of the patient's history were reviewed and updated as appropriate: allergies, current medications, past family history, past medical history, past social history, past surgical history and problem list.    Review of Systems   Constitutional: Negative for fatigue and fever.   Respiratory: Negative for cough, chest tightness and stridor.    Cardiovascular: Negative for chest pain, palpitations and leg swelling.       Objective   Physical Exam   Constitutional: He is oriented to person, place, and time. He appears well-developed and well-nourished.   HENT:   Head: Normocephalic and atraumatic.   Right Ear: External ear normal.   Left Ear: External ear normal.   Nose: Nose normal.   Mouth/Throat: Oropharynx is clear and moist.   Eyes: Conjunctivae are normal.   Neck: Normal range of motion.   Cardiovascular: Normal rate, regular rhythm and normal heart sounds. Exam reveals no gallop and no friction rub.   No murmur heard.  Pulmonary/Chest: Effort normal and breath sounds normal. No stridor. No respiratory distress. He has no wheezes. He has no rales.   Abdominal: Soft. Bowel sounds are normal. He exhibits no distension. There is no tenderness. There is no guarding.   Neurological: He is alert and oriented to person, place, and time.   Skin: Skin is warm and dry.   There is a rash that is erythematous in both groin areas.It is consistent with yeast.   Vitals reviewed.        Visit Vitals  /78   Pulse 61   Ht 190.5 cm (75\")   Wt 122 kg (269 lb 3 oz)   SpO2 97%   BMI 33.65 kg/m²     Body mass index is 33.65 kg/m².      Assessment/Plan   Robinson was seen today for rash.    Diagnoses and all orders for this " visit:    Coronary artery disease involving native coronary artery of native heart without angina pectoris    Essential hypertension  -     Comprehensive metabolic panel; Future  -     CBC w AUTO Differential; Future    Pure hypercholesterolemia  -     Lipid panel; Future    Rash    Other orders  -     fluconazole (DIFLUCAN) 150 MG tablet; Take 1 tablet by mouth Daily.    Return to the clinic in 3 month/s.  Will contact with results as needed.

## 2019-12-23 ENCOUNTER — TELEPHONE (OUTPATIENT)
Dept: FAMILY MEDICINE CLINIC | Facility: CLINIC | Age: 69
End: 2019-12-23

## 2019-12-23 RX ORDER — LOSARTAN POTASSIUM AND HYDROCHLOROTHIAZIDE 12.5; 5 MG/1; MG/1
1 TABLET ORAL DAILY
Qty: 90 TABLET | Refills: 1 | Status: SHIPPED | OUTPATIENT
Start: 2019-12-23 | End: 2020-08-10

## 2019-12-23 NOTE — TELEPHONE ENCOUNTER
PT CALLED TO GET REFILLS SENT TO [facility]  losartan-hydrochlorothiazide (HYZAAR) 50-12.5 MG per tablet

## 2020-01-15 ENCOUNTER — OFFICE VISIT (OUTPATIENT)
Dept: FAMILY MEDICINE CLINIC | Facility: CLINIC | Age: 70
End: 2020-01-15

## 2020-01-15 VITALS
WEIGHT: 271.44 LBS | HEIGHT: 75 IN | DIASTOLIC BLOOD PRESSURE: 72 MMHG | HEART RATE: 59 BPM | SYSTOLIC BLOOD PRESSURE: 128 MMHG | OXYGEN SATURATION: 97 % | BODY MASS INDEX: 33.75 KG/M2

## 2020-01-15 DIAGNOSIS — M79.10 MUSCLE PAIN: ICD-10-CM

## 2020-01-15 DIAGNOSIS — M25.511 ACUTE PAIN OF RIGHT SHOULDER: Primary | ICD-10-CM

## 2020-01-15 PROCEDURE — 99213 OFFICE O/P EST LOW 20 MIN: CPT | Performed by: FAMILY MEDICINE

## 2020-01-15 RX ORDER — METHOCARBAMOL 500 MG/1
500 TABLET, FILM COATED ORAL 4 TIMES DAILY
Qty: 56 TABLET | Refills: 1 | Status: SHIPPED | OUTPATIENT
Start: 2020-01-15 | End: 2020-10-16

## 2020-01-15 RX ORDER — NAPROXEN 500 MG/1
500 TABLET ORAL 2 TIMES DAILY WITH MEALS
Qty: 60 TABLET | Refills: 0 | Status: SHIPPED | OUTPATIENT
Start: 2020-01-15 | End: 2020-02-07

## 2020-01-15 NOTE — PROGRESS NOTES
"Subjective   Robinson Quiroz is a 69 y.o. male.   Cc:shoulder pain  HPIthe patient comes in for c/o right shoulder pain. He denies injury. It hurts over the right scapula. He has ROM.    The following portions of the patient's history were reviewed and updated as appropriate: allergies, current medications, past family history, past medical history, past social history, past surgical history and problem list.    Review of Systems   Constitutional: Negative for fatigue and fever.   Respiratory: Negative for cough, chest tightness and stridor.    Cardiovascular: Negative for chest pain, palpitations and leg swelling.       Objective   Physical Exam   Constitutional: He appears well-developed and well-nourished.   HENT:   Head: Normocephalic and atraumatic.   Right Ear: External ear normal.   Left Ear: External ear normal.   Nose: Nose normal.   Mouth/Throat: Oropharynx is clear and moist.   Eyes: Conjunctivae are normal.   Cardiovascular: Normal rate, regular rhythm and normal heart sounds. Exam reveals no gallop and no friction rub.   No murmur heard.  Pulmonary/Chest: Effort normal and breath sounds normal. No stridor. No respiratory distress. He has no wheezes. He has no rales.   Abdominal: Soft. Bowel sounds are normal. He exhibits no distension and no mass. There is no tenderness. There is no guarding.   Musculoskeletal:   Tenderness over the right scapula. He has full Range of Motion.   Skin: Skin is warm and dry.   Vitals reviewed.        Visit Vitals  /72   Pulse 59   Ht 190.5 cm (75\")   Wt 123 kg (271 lb 7 oz)   SpO2 97%   BMI 33.93 kg/m²     Body mass index is 33.93 kg/m².      Assessment/Plan   Robinson was seen today for shoulder pain.    Diagnoses and all orders for this visit:    Acute pain of right shoulder  -     XR Shoulder 2+ View Right; Future  -     XR Chest 2 View; Future  -     XR Shoulder 2+ View Right; Future    Muscle pain  -     XR Shoulder 2+ View Right; Future    Other orders  -     " methocarbamol (ROBAXIN) 500 MG tablet; Take 1 tablet by mouth 4 (Four) Times a Day.  -     naproxen (NAPROSYN) 500 MG tablet; Take 1 tablet by mouth 2 (Two) Times a Day With Meals.    Return at next appointment.

## 2020-01-17 ENCOUNTER — TELEPHONE (OUTPATIENT)
Dept: FAMILY MEDICINE CLINIC | Facility: CLINIC | Age: 70
End: 2020-01-17

## 2020-01-27 ENCOUNTER — TELEPHONE (OUTPATIENT)
Dept: FAMILY MEDICINE CLINIC | Facility: CLINIC | Age: 70
End: 2020-01-27

## 2020-02-03 ENCOUNTER — TELEPHONE (OUTPATIENT)
Dept: FAMILY MEDICINE CLINIC | Facility: CLINIC | Age: 70
End: 2020-02-03

## 2020-02-06 RX ORDER — TAMSULOSIN HYDROCHLORIDE 0.4 MG/1
CAPSULE ORAL
Qty: 90 CAPSULE | Refills: 1 | Status: SHIPPED | OUTPATIENT
Start: 2020-02-06 | End: 2020-05-13 | Stop reason: SDUPTHER

## 2020-02-07 RX ORDER — NAPROXEN 500 MG/1
TABLET ORAL
Qty: 60 TABLET | Refills: 0 | Status: SHIPPED | OUTPATIENT
Start: 2020-02-07 | End: 2020-02-12

## 2020-02-12 ENCOUNTER — LAB (OUTPATIENT)
Dept: LAB | Facility: HOSPITAL | Age: 70
End: 2020-02-12

## 2020-02-12 ENCOUNTER — OFFICE VISIT (OUTPATIENT)
Dept: SLEEP MEDICINE | Facility: HOSPITAL | Age: 70
End: 2020-02-12

## 2020-02-12 ENCOUNTER — OFFICE VISIT (OUTPATIENT)
Dept: FAMILY MEDICINE CLINIC | Facility: CLINIC | Age: 70
End: 2020-02-12

## 2020-02-12 VITALS
BODY MASS INDEX: 34.44 KG/M2 | DIASTOLIC BLOOD PRESSURE: 53 MMHG | SYSTOLIC BLOOD PRESSURE: 132 MMHG | HEIGHT: 75 IN | HEART RATE: 68 BPM | OXYGEN SATURATION: 96 % | WEIGHT: 277 LBS

## 2020-02-12 VITALS
HEIGHT: 75 IN | OXYGEN SATURATION: 96 % | BODY MASS INDEX: 34.33 KG/M2 | WEIGHT: 276.13 LBS | DIASTOLIC BLOOD PRESSURE: 74 MMHG | SYSTOLIC BLOOD PRESSURE: 126 MMHG | HEART RATE: 83 BPM

## 2020-02-12 DIAGNOSIS — G89.29 CHRONIC RIGHT SHOULDER PAIN: ICD-10-CM

## 2020-02-12 DIAGNOSIS — I10 ESSENTIAL HYPERTENSION: ICD-10-CM

## 2020-02-12 DIAGNOSIS — G47.33 OBSTRUCTIVE SLEEP APNEA, ADULT: Primary | ICD-10-CM

## 2020-02-12 DIAGNOSIS — M19.90 OSTEOARTHRITIS, UNSPECIFIED OSTEOARTHRITIS TYPE, UNSPECIFIED SITE: Primary | ICD-10-CM

## 2020-02-12 DIAGNOSIS — E78.00 PURE HYPERCHOLESTEROLEMIA: ICD-10-CM

## 2020-02-12 DIAGNOSIS — M25.511 CHRONIC RIGHT SHOULDER PAIN: ICD-10-CM

## 2020-02-12 LAB
ALBUMIN SERPL-MCNC: 4.7 G/DL (ref 3.5–5.2)
ALBUMIN/GLOB SERPL: 1.9 G/DL
ALP SERPL-CCNC: 82 U/L (ref 39–117)
ALT SERPL W P-5'-P-CCNC: 46 U/L (ref 1–41)
ANION GAP SERPL CALCULATED.3IONS-SCNC: 11.6 MMOL/L (ref 5–15)
AST SERPL-CCNC: 34 U/L (ref 1–40)
BASOPHILS # BLD AUTO: 0.07 10*3/MM3 (ref 0–0.2)
BASOPHILS NFR BLD AUTO: 1 % (ref 0–1.5)
BILIRUB SERPL-MCNC: 0.8 MG/DL (ref 0.2–1.2)
BUN BLD-MCNC: 11 MG/DL (ref 8–23)
BUN/CREAT SERPL: 12.6 (ref 7–25)
CALCIUM SPEC-SCNC: 9.9 MG/DL (ref 8.6–10.5)
CHLORIDE SERPL-SCNC: 98 MMOL/L (ref 98–107)
CHOLEST SERPL-MCNC: 106 MG/DL (ref 0–200)
CO2 SERPL-SCNC: 27.4 MMOL/L (ref 22–29)
CREAT BLD-MCNC: 0.87 MG/DL (ref 0.76–1.27)
DEPRECATED RDW RBC AUTO: 38.9 FL (ref 37–54)
EOSINOPHIL # BLD AUTO: 0.24 10*3/MM3 (ref 0–0.4)
EOSINOPHIL NFR BLD AUTO: 3.5 % (ref 0.3–6.2)
ERYTHROCYTE [DISTWIDTH] IN BLOOD BY AUTOMATED COUNT: 12.6 % (ref 12.3–15.4)
GFR SERPL CREATININE-BSD FRML MDRD: 87 ML/MIN/1.73
GLOBULIN UR ELPH-MCNC: 2.5 GM/DL
GLUCOSE BLD-MCNC: 101 MG/DL (ref 65–99)
HCT VFR BLD AUTO: 43.2 % (ref 37.5–51)
HDLC SERPL-MCNC: 45 MG/DL (ref 40–60)
HGB BLD-MCNC: 15.1 G/DL (ref 13–17.7)
IMM GRANULOCYTES # BLD AUTO: 0.03 10*3/MM3 (ref 0–0.05)
IMM GRANULOCYTES NFR BLD AUTO: 0.4 % (ref 0–0.5)
LDLC SERPL CALC-MCNC: 26 MG/DL (ref 0–100)
LDLC/HDLC SERPL: 0.57 {RATIO}
LYMPHOCYTES # BLD AUTO: 2 10*3/MM3 (ref 0.7–3.1)
LYMPHOCYTES NFR BLD AUTO: 29.4 % (ref 19.6–45.3)
MCH RBC QN AUTO: 30.4 PG (ref 26.6–33)
MCHC RBC AUTO-ENTMCNC: 35 G/DL (ref 31.5–35.7)
MCV RBC AUTO: 87.1 FL (ref 79–97)
MONOCYTES # BLD AUTO: 0.92 10*3/MM3 (ref 0.1–0.9)
MONOCYTES NFR BLD AUTO: 13.5 % (ref 5–12)
NEUTROPHILS # BLD AUTO: 3.55 10*3/MM3 (ref 1.7–7)
NEUTROPHILS NFR BLD AUTO: 52.2 % (ref 42.7–76)
NRBC BLD AUTO-RTO: 0 /100 WBC (ref 0–0.2)
PLATELET # BLD AUTO: 185 10*3/MM3 (ref 140–450)
PMV BLD AUTO: 10.4 FL (ref 6–12)
POTASSIUM BLD-SCNC: 3.7 MMOL/L (ref 3.5–5.2)
PROT SERPL-MCNC: 7.2 G/DL (ref 6–8.5)
RBC # BLD AUTO: 4.96 10*6/MM3 (ref 4.14–5.8)
SODIUM BLD-SCNC: 137 MMOL/L (ref 136–145)
TRIGL SERPL-MCNC: 177 MG/DL (ref 0–150)
VLDLC SERPL-MCNC: 35.4 MG/DL (ref 5–40)
WBC NRBC COR # BLD: 6.81 10*3/MM3 (ref 3.4–10.8)

## 2020-02-12 PROCEDURE — 36415 COLL VENOUS BLD VENIPUNCTURE: CPT

## 2020-02-12 PROCEDURE — 80053 COMPREHEN METABOLIC PANEL: CPT

## 2020-02-12 PROCEDURE — 85025 COMPLETE CBC W/AUTO DIFF WBC: CPT

## 2020-02-12 PROCEDURE — 80061 LIPID PANEL: CPT

## 2020-02-12 PROCEDURE — 99213 OFFICE O/P EST LOW 20 MIN: CPT | Performed by: NURSE PRACTITIONER

## 2020-02-12 PROCEDURE — 99214 OFFICE O/P EST MOD 30 MIN: CPT | Performed by: FAMILY MEDICINE

## 2020-02-12 NOTE — PROGRESS NOTES
Subjective   Robinson Quiroz is a 69 y.o. male.   Cc: follow up of chronic medical issues  Hypertension   This is a chronic problem. The current episode started more than 1 year ago. The problem has been gradually improving since onset. The problem is controlled. Pertinent negatives include no anxiety, blurred vision, orthopnea, palpitations or shortness of breath. There are no associated agents to hypertension. Risk factors for coronary artery disease include dyslipidemia. Current antihypertension treatment includes beta blockers, angiotensin blockers, diuretics and lifestyle changes. Compliance problems include diet.  There is no history of angina, kidney disease, heart failure or PVD. There is no history of chronic renal disease.   Hyperlipidemia   This is a chronic problem. The current episode started more than 1 year ago. Recent lipid tests were reviewed and are normal. He has no history of chronic renal disease, liver disease or obesity. There are no known factors aggravating his hyperlipidemia. Pertinent negatives include no shortness of breath. Current antihyperlipidemic treatment includes statins and diet change.     The patient comes in for check of his chronic medical issues. His back and shoulder are doing better.  The following portions of the patient's history were reviewed and updated as appropriate: allergies, current medications, past family history, past medical history, past social history, past surgical history and problem list.    Review of Systems   Eyes: Negative for blurred vision.   Respiratory: Negative for shortness of breath.    Cardiovascular: Negative for palpitations and orthopnea.       Objective   Physical Exam   Constitutional: He appears well-developed and well-nourished.   HENT:   Head: Normocephalic and atraumatic.   Right Ear: External ear normal.   Left Ear: External ear normal.   Nose: Nose normal.   Mouth/Throat: Oropharynx is clear and moist.   Eyes: Conjunctivae are  "normal.   Neck: Normal range of motion.   Cardiovascular: Normal rate, regular rhythm and normal heart sounds. Exam reveals no gallop and no friction rub.   No murmur heard.  Pulmonary/Chest: Breath sounds normal. No stridor. No respiratory distress. He has no rales.   Abdominal: Soft. Bowel sounds are normal. He exhibits no distension. There is no tenderness.   Musculoskeletal:   Minimal tenderness at the right AC joint. Good range of motion of the right shoulder.The low back is minimally tender at the Lumbo-sacral area.   Skin: Skin is warm and dry.   Vitals reviewed.        Visit Vitals  /74   Pulse 83   Ht 190.5 cm (75\")   Wt 125 kg (276 lb 2 oz)   SpO2 96%   BMI 34.51 kg/m²     Body mass index is 34.51 kg/m².      Assessment/Plan   Robinson was seen today for follow-up.    Diagnoses and all orders for this visit:    Osteoarthritis, unspecified osteoarthritis type, unspecified site    Chronic right shoulder pain    Pure hypercholesterolemia  -     Lipid panel; Future    Essential hypertension  -     Comprehensive metabolic panel; Future  -     CBC w AUTO Differential; Future    Continue on current medications.  Return to the clinic in 3 month/s.  Will contact with results as needed.    "

## 2020-02-12 NOTE — PROGRESS NOTES
Sleep Clinic Follow Up    Date: 2020  Primary Care Physician: Merrick Gorman MD    Last office visit: 2019 (I reviewed this note)    CC: Follow up: JAVIER on CPAP      Interim History:  Since the last visit:    1) moderate JAVIER -  Robinson Quiroz has remained compliant with CPAP. He denies mask and machine issues, dry mouth, headaches, or pressures intolerance. He denies abnormal dreams, sleep paralysis, nasal congestion, URI sx.    Sleep Testin. PSG on 04/10/2013, AHI of 18   2. CPAP titration on same day recommended 10 cm H2O   3. Currently on 9 cm H2O    PAP Data:    Time frame: 2019-02/10/2020   Compliance 96.1 %  Average use on days used: 6 hrs 13 min  Percent of days with usage greater than or equal to 4 hours: 88.2%  PAP range : 9 cm H2O  Average 90% pressure: 9 cmH2O  Leak: 0 minutes  Average AHI 1.2 events/hr  Mask type: Nasal pillows  DME: [facility]    Bed time: 2300  Sleep latency: 5 minutes  Number of times awakens during the night: 1  Wake time: 1973-7152  Estimated total sleep time at night: 6-8 hours  Caffeine intake: 3 cups of coffee, 0 cups of tea, and 0-1 sodas per day  Alcohol intake: 0 drinks per week  Nap time: very rare   Sleepiness with Driving: none     [place] - 8    2) Patient reports rare RLS symptoms.     PMHx, FH, SH reviewed and pertinent changes are: unchanged from last office visit on 2019      REVIEW OF SYSTEMS:   Negative for chest pain, SOA, fever, chills, cough, N/V/D, abdominal pain.    Smoking:none        Exam:  Vitals:    20 1405   BP: 132/53   Pulse: 68   SpO2: 96%           20  1405   Weight: 126 kg (277 lb)     Body mass index is 34.62 kg/m². Patient's Body mass index is 34.62 kg/m². BMI is above normal parameters. Recommendations include: referral to primary care.      Gen:                No distress, conversant, pleasant, appears stated age, alert, oriented  Eyes:               Anicteric sclera, moist conjunctiva, no lid lag                            PERRL, EOMI   Heent:             NC/AT                          Oropharynx clear                          Normal hearing  Lungs:             Normal effort, non-labored breathing                          Clear to auscultation bilaterally          CV:                  Normal S1/S2, no murmur                          No lower extremity edema  ABD:               Soft, rounded, non-distended                          Normal bowel sounds                    Psych:             Appropriate affect  Neuro:             CN 2-12 appear intact    Past Medical History:   Diagnosis Date   • Arthritis    • Coronary artery disease    • History of echocardiogram 09/13/2012    NRL LV systolic function w/ EF 60%. Mild CLVH. NRL diastolic function of LV myocardium. Left atrial enlargement   • HL (hearing loss)    • Hyperlipidemia    • Hypertension    • Kidney stone    • Obesity    • Pneumonia    • Stroke (CMS/HCC)        Current Outpatient Medications:   •  aspirin 81 MG tablet, Take 1 tablet by mouth Daily., Disp: 30 tablet, Rfl: 11  •  atorvastatin (LIPITOR) 80 MG tablet, TAKE 1 TABLET BY MOUTH EVERY DAY, Disp: 90 tablet, Rfl: 1  •  b complex-C-folic acid 1 MG capsule, Take 1 capsule by mouth Daily., Disp: , Rfl:   •  losartan-hydrochlorothiazide (HYZAAR) 50-12.5 MG per tablet, Take 1 tablet by mouth Daily., Disp: 90 tablet, Rfl: 1  •  methocarbamol (ROBAXIN) 500 MG tablet, Take 1 tablet by mouth 4 (Four) Times a Day., Disp: 56 tablet, Rfl: 1  •  metoprolol succinate XL (TOPROL-XL) 50 MG 24 hr tablet, TAKE 1 TABLET BY MOUTH EVERY DAY, Disp: 90 tablet, Rfl: 1  •  Multiple Vitamins-Minerals (MULTIVITAMIN ADULTS 50+ PO), Take 1 tablet by mouth., Disp: , Rfl:   •  Omega-3 Fatty Acids (FISH OIL) 1000 MG capsule capsule, Take 1,200 mg by mouth 2 (Two) Times a Day With Meals., Disp: , Rfl:   •  pregabalin (LYRICA) 50 MG capsule, Take 1 capsule by mouth 2 (Two) Times a Day., Disp: 180 capsule, Rfl: 1  •  tamsulosin  (FLOMAX) 0.4 MG capsule 24 hr capsule, TAKE 1 CAPSULE BY MOUTH EVERY DAY, Disp: 90 capsule, Rfl: 1      Assessment and Plan:    1. Obstructive sleep apnea Established, stable (1)  1. Compliant with PAP therapy  2. Continue PAP as prescribed  3. Script for PAP supplies  4. Return to clinic in 1 year with compliance report unless change in symptoms in interim period  2. Restless leg syndrome/ /Robles-Ekbom disease (RLS/WED) self-limited or minor problem        8 of 15 minutes spent face-to-face counseling patient extensively regarding:   PAP therapy, PAP compliance and PAP maintenance      RTC in 12 months. Patient agrees to return sooner if changes in symptoms.        This document has been electronically signed by KWESI Corral on February 12, 2020 2:09 PM          CC: Merrick Gorman MD          No ref. provider found

## 2020-05-08 ENCOUNTER — TELEPHONE (OUTPATIENT)
Dept: FAMILY MEDICINE CLINIC | Facility: CLINIC | Age: 70
End: 2020-05-08

## 2020-05-08 NOTE — TELEPHONE ENCOUNTER
Patient called and requested to change his visit type for 5/13/2020 to a tele-health visit. He wants to keep the same day and time along with his girlfriend if possible.    He can be contacted at [phone] to reschedule.

## 2020-05-13 ENCOUNTER — OFFICE VISIT (OUTPATIENT)
Dept: FAMILY MEDICINE CLINIC | Facility: CLINIC | Age: 70
End: 2020-05-13

## 2020-05-13 VITALS
HEIGHT: 75 IN | HEART RATE: 55 BPM | DIASTOLIC BLOOD PRESSURE: 65 MMHG | WEIGHT: 273.25 LBS | BODY MASS INDEX: 33.97 KG/M2 | SYSTOLIC BLOOD PRESSURE: 133 MMHG

## 2020-05-13 DIAGNOSIS — E78.00 PURE HYPERCHOLESTEROLEMIA: ICD-10-CM

## 2020-05-13 DIAGNOSIS — G62.9 PERIPHERAL POLYNEUROPATHY: ICD-10-CM

## 2020-05-13 DIAGNOSIS — I10 ESSENTIAL HYPERTENSION: ICD-10-CM

## 2020-05-13 DIAGNOSIS — I25.10 CORONARY ARTERY DISEASE INVOLVING NATIVE CORONARY ARTERY OF NATIVE HEART WITHOUT ANGINA PECTORIS: Primary | ICD-10-CM

## 2020-05-13 PROCEDURE — 99214 OFFICE O/P EST MOD 30 MIN: CPT | Performed by: FAMILY MEDICINE

## 2020-05-13 RX ORDER — METOPROLOL SUCCINATE 50 MG/1
50 TABLET, EXTENDED RELEASE ORAL DAILY
Qty: 90 TABLET | Refills: 1 | Status: SHIPPED | OUTPATIENT
Start: 2020-05-13 | End: 2020-11-05

## 2020-05-13 RX ORDER — ATORVASTATIN CALCIUM 80 MG/1
80 TABLET, FILM COATED ORAL DAILY
Qty: 90 TABLET | Refills: 1 | Status: SHIPPED | OUTPATIENT
Start: 2020-05-13 | End: 2020-11-05

## 2020-05-13 RX ORDER — TAMSULOSIN HYDROCHLORIDE 0.4 MG/1
1 CAPSULE ORAL DAILY
Qty: 90 CAPSULE | Refills: 1 | Status: SHIPPED | OUTPATIENT
Start: 2020-05-13 | End: 2021-02-12 | Stop reason: SDUPTHER

## 2020-05-13 RX ORDER — PREGABALIN 50 MG/1
50 CAPSULE ORAL 2 TIMES DAILY
Qty: 180 CAPSULE | Refills: 1 | Status: SHIPPED | OUTPATIENT
Start: 2020-05-13 | End: 2020-11-25 | Stop reason: SDUPTHER

## 2020-05-13 NOTE — PROGRESS NOTES
Subjective   Robinson Quiroz is a 70 y.o. male.   Cc: follow up of chronic medical issues    Hypertension   This is a chronic problem. The current episode started more than 1 year ago. The problem has been gradually improving since onset. Pertinent negatives include no anxiety, blurred vision, chest pain, headaches, malaise/fatigue, neck pain, orthopnea, peripheral edema, PND, shortness of breath or sweats. Current antihypertension treatment includes beta blockers, ACE inhibitors and diuretics.   Hyperlipidemia   This is a chronic problem. The current episode started more than 1 year ago. The problem is resistant. Recent lipid tests were reviewed and are variable. Pertinent negatives include no chest pain or shortness of breath. Current antihyperlipidemic treatment includes statins.     His polyneuropathy is at it's baseline.  The following portions of the patient's history were reviewed and updated as appropriate: allergies, current medications, past family history, past medical history, past social history, past surgical history and problem list.    Review of Systems   Constitutional: Negative for malaise/fatigue.   Eyes: Negative for blurred vision.   Respiratory: Negative for shortness of breath.    Cardiovascular: Negative for chest pain, orthopnea and PND.   Musculoskeletal: Negative for neck pain.   Neurological: Negative for headaches.       Objective   Physical Exam   Constitutional: He is oriented to person, place, and time. He appears well-developed and well-nourished.   HENT:   Head: Normocephalic and atraumatic.   Right Ear: External ear normal.   Left Ear: External ear normal.   Nose: Nose normal.   Mouth/Throat: Oropharynx is clear and moist.   Eyes: Pupils are equal, round, and reactive to light. Conjunctivae and EOM are normal.   Neck: Normal range of motion. Neck supple.   Pulmonary/Chest: Effort normal.   Abdominal: Soft. There is no tenderness.   Musculoskeletal:   Back is non tender.  "  Neurological: He is alert and oriented to person, place, and time.   Skin: Skin is warm and dry.   Vitals reviewed.    Exam done at a distance.  Visit Vitals  /65   Pulse 55   Ht 190.5 cm (75\")   Wt 124 kg (273 lb 4 oz)   BMI 34.15 kg/m²     Body mass index is 34.15 kg/m².      Assessment/Plan   Robinson was seen today for follow-up.    Diagnoses and all orders for this visit:    Coronary artery disease involving native coronary artery of native heart without angina pectoris    Essential hypertension    Pure hypercholesterolemia    Peripheral polyneuropathy  -     pregabalin (Lyrica) 50 MG capsule; Take 1 capsule by mouth 2 (Two) Times a Day.    Other orders  -     atorvastatin (LIPITOR) 80 MG tablet; Take 1 tablet by mouth Daily.  -     tamsulosin (FLOMAX) 0.4 MG capsule 24 hr capsule; Take 1 capsule by mouth Daily.  -     metoprolol succinate XL (TOPROL-XL) 50 MG 24 hr tablet; Take 1 tablet by mouth Daily.    Return to the clinic in 3 month/s.  Will contact with results as needed.  [place] is appropriate-[id number].  "

## 2020-08-10 RX ORDER — LOSARTAN POTASSIUM AND HYDROCHLOROTHIAZIDE 12.5; 5 MG/1; MG/1
TABLET ORAL
Qty: 90 TABLET | Refills: 1 | Status: SHIPPED | OUTPATIENT
Start: 2020-08-10 | End: 2021-02-08

## 2020-08-14 ENCOUNTER — OFFICE VISIT (OUTPATIENT)
Dept: FAMILY MEDICINE CLINIC | Facility: CLINIC | Age: 70
End: 2020-08-14

## 2020-08-14 VITALS
HEART RATE: 62 BPM | OXYGEN SATURATION: 98 % | BODY MASS INDEX: 34.44 KG/M2 | HEIGHT: 75 IN | DIASTOLIC BLOOD PRESSURE: 76 MMHG | SYSTOLIC BLOOD PRESSURE: 126 MMHG | WEIGHT: 277 LBS

## 2020-08-14 DIAGNOSIS — I10 ESSENTIAL HYPERTENSION: Primary | ICD-10-CM

## 2020-08-14 DIAGNOSIS — E78.00 PURE HYPERCHOLESTEROLEMIA: ICD-10-CM

## 2020-08-14 DIAGNOSIS — I25.10 CORONARY ARTERY DISEASE INVOLVING NATIVE CORONARY ARTERY OF NATIVE HEART WITHOUT ANGINA PECTORIS: ICD-10-CM

## 2020-08-14 DIAGNOSIS — G62.9 PERIPHERAL POLYNEUROPATHY: ICD-10-CM

## 2020-08-14 PROCEDURE — 99214 OFFICE O/P EST MOD 30 MIN: CPT | Performed by: FAMILY MEDICINE

## 2020-08-14 NOTE — PROGRESS NOTES
Subjective   Robinson Quiroz is a 70 y.o. male.   Cc: follow up of chronic medical issues    Coronary Artery Disease   Presents for follow-up visit. Pertinent negatives include no chest pain, chest pressure, dizziness, leg swelling, muscle weakness, palpitations or shortness of breath. Risk factors include hyperlipidemia.   Hypertension   This is a chronic problem. The current episode started more than 1 year ago. The problem has been gradually improving since onset. Pertinent negatives include no anxiety, blurred vision, chest pain, malaise/fatigue, neck pain, orthopnea, palpitations, PND, shortness of breath or sweats. Current antihypertension treatment includes beta blockers.   Hyperlipidemia   This is a chronic problem. The current episode started more than 1 year ago. Recent lipid tests were reviewed and are variable. Pertinent negatives include no chest pain or shortness of breath.   Neropathy- It is associated with burning. It seems to be getting better.  He is at his base lne.  The following portions of the patient's history were reviewed and updated as appropriate: allergies, current medications, past family history, past medical history, past social history, past surgical history and problem list.    Review of Systems   Constitutional: Negative for malaise/fatigue.   Eyes: Negative for blurred vision.   Respiratory: Negative for cough and shortness of breath.    Cardiovascular: Negative for chest pain, palpitations, orthopnea, leg swelling and PND.   Musculoskeletal: Negative for muscle weakness and neck pain.   Neurological: Negative for dizziness.       Objective   Physical Exam   Constitutional: He is oriented to person, place, and time. He appears well-developed and well-nourished.   HENT:   Head: Normocephalic and atraumatic.   Right Ear: External ear normal.   Left Ear: External ear normal.   Nose: Nose normal.   Mouth/Throat: Oropharynx is clear and moist.   Eyes: Pupils are equal, round, and  "reactive to light.   Neck: Normal range of motion.   Cardiovascular: Normal rate, regular rhythm and normal heart sounds. Exam reveals no gallop and no friction rub.   No murmur heard.  Pulmonary/Chest: Effort normal and breath sounds normal. No stridor. No respiratory distress. He has no wheezes. He has no rales.   Abdominal: Soft. Bowel sounds are normal. He exhibits no distension. There is no tenderness. There is no guarding.   Neurological: He is alert and oriented to person, place, and time.   Skin: Skin is warm and dry.   Vitals reviewed.        Visit Vitals  /76   Pulse 62   Ht 190.5 cm (75\")   Wt 126 kg (277 lb)   SpO2 98%   BMI 34.62 kg/m²     Body mass index is 34.62 kg/m².      Assessment/Plan   Robinson was seen today for follow-up, coronary artery disease and osteoarthritis.    Diagnoses and all orders for this visit:    Essential hypertension  -     Comprehensive metabolic panel; Future  -     CBC w MANUAL Differential; Future  -     Lipid panel; Future    Pure hypercholesterolemia    Peripheral polyneuropathy    Coronary artery disease involving native coronary artery of native heart without angina pectoris    Return to the clinic in 3 month/s.  Will contact with results as needed.    "

## 2020-09-30 ENCOUNTER — LAB (OUTPATIENT)
Dept: LAB | Facility: HOSPITAL | Age: 70
End: 2020-09-30

## 2020-09-30 DIAGNOSIS — I10 ESSENTIAL HYPERTENSION: ICD-10-CM

## 2020-09-30 LAB
ALBUMIN SERPL-MCNC: 4.2 G/DL (ref 3.5–5.2)
ALBUMIN/GLOB SERPL: 1.4 G/DL
ALP SERPL-CCNC: 83 U/L (ref 39–117)
ALT SERPL W P-5'-P-CCNC: 92 U/L (ref 1–41)
ANION GAP SERPL CALCULATED.3IONS-SCNC: 12.9 MMOL/L (ref 5–15)
AST SERPL-CCNC: 72 U/L (ref 1–40)
BASOPHILS # BLD MANUAL: 0.06 10*3/MM3 (ref 0–0.2)
BASOPHILS NFR BLD AUTO: 1 % (ref 0–1.5)
BILIRUB SERPL-MCNC: 0.7 MG/DL (ref 0–1.2)
BUN SERPL-MCNC: 16 MG/DL (ref 8–23)
BUN/CREAT SERPL: 20.3 (ref 7–25)
CALCIUM SPEC-SCNC: 9.6 MG/DL (ref 8.6–10.5)
CHLORIDE SERPL-SCNC: 101 MMOL/L (ref 98–107)
CHOLEST SERPL-MCNC: 109 MG/DL (ref 0–200)
CO2 SERPL-SCNC: 25.1 MMOL/L (ref 22–29)
CREAT SERPL-MCNC: 0.79 MG/DL (ref 0.76–1.27)
DEPRECATED RDW RBC AUTO: 39.4 FL (ref 37–54)
EOSINOPHIL # BLD MANUAL: 0.4 10*3/MM3 (ref 0–0.4)
EOSINOPHIL NFR BLD MANUAL: 7.2 % (ref 0.3–6.2)
ERYTHROCYTE [DISTWIDTH] IN BLOOD BY AUTOMATED COUNT: 12.4 % (ref 12.3–15.4)
GFR SERPL CREATININE-BSD FRML MDRD: 97 ML/MIN/1.73
GLOBULIN UR ELPH-MCNC: 3 GM/DL
GLUCOSE SERPL-MCNC: 95 MG/DL (ref 65–99)
HCT VFR BLD AUTO: 43.3 % (ref 37.5–51)
HDLC SERPL-MCNC: 44 MG/DL (ref 40–60)
HGB BLD-MCNC: 15.1 G/DL (ref 13–17.7)
LDLC SERPL CALC-MCNC: 38 MG/DL (ref 0–100)
LDLC/HDLC SERPL: 0.86 {RATIO}
LYMPHOCYTES # BLD MANUAL: 2.1 10*3/MM3 (ref 0.7–3.1)
LYMPHOCYTES NFR BLD MANUAL: 12.4 % (ref 5–12)
LYMPHOCYTES NFR BLD MANUAL: 38.1 % (ref 19.6–45.3)
MCH RBC QN AUTO: 30.6 PG (ref 26.6–33)
MCHC RBC AUTO-ENTMCNC: 34.9 G/DL (ref 31.5–35.7)
MCV RBC AUTO: 87.8 FL (ref 79–97)
MONOCYTES # BLD AUTO: 0.68 10*3/MM3 (ref 0.1–0.9)
NEUTROPHILS # BLD AUTO: 2.27 10*3/MM3 (ref 1.7–7)
NEUTROPHILS NFR BLD MANUAL: 41.2 % (ref 42.7–76)
PLAT MORPH BLD: NORMAL
PLATELET # BLD AUTO: 173 10*3/MM3 (ref 140–450)
PMV BLD AUTO: 10.9 FL (ref 6–12)
POIKILOCYTOSIS BLD QL SMEAR: ABNORMAL
POTASSIUM SERPL-SCNC: 3.9 MMOL/L (ref 3.5–5.2)
PROT SERPL-MCNC: 7.2 G/DL (ref 6–8.5)
RBC # BLD AUTO: 4.93 10*6/MM3 (ref 4.14–5.8)
SODIUM SERPL-SCNC: 139 MMOL/L (ref 136–145)
TRIGL SERPL-MCNC: 136 MG/DL (ref 0–150)
VLDLC SERPL-MCNC: 27.2 MG/DL (ref 5–40)
WBC # BLD AUTO: 5.52 10*3/MM3 (ref 3.4–10.8)
WBC MORPH BLD: NORMAL

## 2020-09-30 PROCEDURE — 80061 LIPID PANEL: CPT

## 2020-09-30 PROCEDURE — 80053 COMPREHEN METABOLIC PANEL: CPT

## 2020-09-30 PROCEDURE — 36415 COLL VENOUS BLD VENIPUNCTURE: CPT

## 2020-09-30 PROCEDURE — 85007 BL SMEAR W/DIFF WBC COUNT: CPT

## 2020-09-30 PROCEDURE — 85027 COMPLETE CBC AUTOMATED: CPT

## 2020-10-08 ENCOUNTER — FLU SHOT (OUTPATIENT)
Dept: FAMILY MEDICINE CLINIC | Facility: CLINIC | Age: 70
End: 2020-10-08

## 2020-10-08 ENCOUNTER — TELEPHONE (OUTPATIENT)
Dept: FAMILY MEDICINE CLINIC | Facility: CLINIC | Age: 70
End: 2020-10-08

## 2020-10-08 DIAGNOSIS — Z23 NEED FOR INFLUENZA VACCINATION: ICD-10-CM

## 2020-10-08 PROCEDURE — G0008 ADMIN INFLUENZA VIRUS VAC: HCPCS | Performed by: FAMILY MEDICINE

## 2020-10-08 PROCEDURE — 90694 VACC AIIV4 NO PRSRV 0.5ML IM: CPT | Performed by: FAMILY MEDICINE

## 2020-10-16 ENCOUNTER — OFFICE VISIT (OUTPATIENT)
Dept: CARDIOLOGY | Facility: CLINIC | Age: 70
End: 2020-10-16

## 2020-10-16 VITALS
OXYGEN SATURATION: 98 % | DIASTOLIC BLOOD PRESSURE: 65 MMHG | HEIGHT: 75 IN | WEIGHT: 280 LBS | HEART RATE: 64 BPM | BODY MASS INDEX: 34.82 KG/M2 | SYSTOLIC BLOOD PRESSURE: 135 MMHG

## 2020-10-16 DIAGNOSIS — I25.10 CORONARY ARTERY DISEASE INVOLVING NATIVE CORONARY ARTERY OF NATIVE HEART WITHOUT ANGINA PECTORIS: Primary | ICD-10-CM

## 2020-10-16 PROCEDURE — 99214 OFFICE O/P EST MOD 30 MIN: CPT | Performed by: INTERNAL MEDICINE

## 2020-10-16 PROCEDURE — 93000 ELECTROCARDIOGRAM COMPLETE: CPT | Performed by: INTERNAL MEDICINE

## 2020-10-16 NOTE — PROGRESS NOTES
[facility]  OFFICE NOTE    Cardiovascular Medicine  Manuel Alcaraz M.D., RPVI         No referring provider defined for this encounter.    Thank you for asking me to see Robinson Quiroz for CAD.    History of Present Illness  This is a 70 y.o. male with:    1. Coronary artery disease involving native coronary artery of native heart without angina pectoris    2. Pure hypercholesterolemia    3. Essential hypertension          Chief complaint -Follow-up CAD        History of present Illness-70 year old gentleman with history of coronary disease status post quadruple bypass in 2004,   has coronary angiogram from 2012 which showed a patent LIMA to LAD, SVG to diagonal and is filling the LAD as well, SVG to circumflex looks to be occluded SVG to RPDA was patent.  He had significant disease in his native circumflex so underwent PCI of the mid circumflex and into and OM. had a abdominal aortic aneurysm screening last year and was okay.  He had a nuclear stress test in January 2017 which showed no ischemia EF was normal.  He is doing well no chest pain or shortness of breath is on optimal medical therapy with beta blockers and angiotensin receptor blockers and his blood pressure is well controlled.  His BMI is high talked to him about diet and exercise.  Denies any GI symptoms will CNS symptoms.   He had one episode of chest pain several weeks ago, which is sharp in character lasted for 1 minute, was nonexertional and resolved on its own.  Is not recurred.  He is able to exercise on a treadmill for 30 minutes without any chest pain or shortness of breath.    10/16/2020:  No acute issues since last visit but denying any chest pain or shortness of breath.        Review of Systems - ROS  Constitution: Negative for weakness, weight gain and weight loss.   HENT: Negative for congestion.    Eyes: Negative for blurred vision.   Cardiovascular: As mentioned above  Respiratory: Negative for cough and  hemoptysis.    Endocrine: Negative for polydipsia and polyuria.   Hematologic/Lymphatic: Negative for bleeding problem. Does not bruise/bleed easily.   Skin: Negative for flushing.   Musculoskeletal: Negative for neck pain and stiffness.   Gastrointestinal: Negative for abdominal pain, diarrhea, jaundice, melena, nausea and vomiting.   Genitourinary: Negative for dysuria and hematuria.   Neurological: Negative for dizziness, focal weakness and numbness.   Psychiatric/Behavioral: Negative for altered mental status and depression.          All other systems were reviewed and were negative.    family history includes Alcohol abuse in his paternal uncle; Arthritis in his maternal aunt, maternal grandfather, maternal grandmother, and maternal uncle; Cancer in his mother; Diabetes in his mother; Early death in his mother; Hearing loss in his daughter and mother; Heart disease in his father; Hyperlipidemia in his mother; Liver disease in his mother; Pancreatic cancer in his mother; Stroke in his father.     reports that he quit smoking about 17 years ago. His smoking use included cigarettes. He has never used smokeless tobacco. He reports that he does not drink alcohol or use drugs.    Allergies   Allergen Reactions   • Penicillins Anaphylaxis   • Latex Rash         Current Outpatient Medications:   •  aspirin 81 MG tablet, Take 1 tablet by mouth Daily., Disp: 30 tablet, Rfl: 11  •  atorvastatin (LIPITOR) 80 MG tablet, Take 1 tablet by mouth Daily., Disp: 90 tablet, Rfl: 1  •  b complex-C-folic acid 1 MG capsule, Take 1 capsule by mouth Daily., Disp: , Rfl:   •  losartan-hydrochlorothiazide (HYZAAR) 50-12.5 MG per tablet, TAKE 1 TABLET BY MOUTH EVERY DAY, Disp: 90 tablet, Rfl: 1  •  metoprolol succinate XL (TOPROL-XL) 50 MG 24 hr tablet, Take 1 tablet by mouth Daily., Disp: 90 tablet, Rfl: 1  •  Multiple Vitamins-Minerals (MULTIVITAMIN ADULTS 50+ PO), Take 1 tablet by mouth., Disp: , Rfl:   •  Omega-3 Fatty Acids (FISH  "OIL) 1000 MG capsule capsule, Take 1,200 mg by mouth Daily With Breakfast., Disp: , Rfl:   •  pregabalin (Lyrica) 50 MG capsule, Take 1 capsule by mouth 2 (Two) Times a Day., Disp: 180 capsule, Rfl: 1  •  tamsulosin (FLOMAX) 0.4 MG capsule 24 hr capsule, Take 1 capsule by mouth Daily., Disp: 90 capsule, Rfl: 1  •  methocarbamol (ROBAXIN) 500 MG tablet, Take 1 tablet by mouth 4 (Four) Times a Day., Disp: 56 tablet, Rfl: 1    Physical Exam:  Vitals:    10/16/20 1032   BP: 135/65   BP Location: Left arm   Patient Position: Sitting   Cuff Size: Adult   Pulse: 64   SpO2: 98%   Weight: 127 kg (280 lb)   Height: 190.5 cm (75\")     Current Pain Level: none  Pulse Ox: Normal  on room air  General: alert, appears stated age and cooperative     Body Habitus: well-nourished    HEENT: Head: Normocephalic, no lesions, without obvious abnormality. No arcus senilis, xanthelasma or xanthomas.    Neuro: alert, oriented x3  Pulses: 2+ and symmetric  JVP: Volume/Pulsation: Normal.  Normal waveforms.   Appropriate inspiratory decrease.  No Kussmaul's. No Geno's.   Carotid Exam: no bruit normal pulsation bilaterally   Carotid Volume: normal.     Respirations: no increased work of breathing   Chest:  Normal    Pulmonary:Normal   Precordium: Normal impulses. P2 is not palpable.  RV Heave: absent  LV Heave: absent  [place]:  normal size and placement  Palpable S4: absent.  Heart rate: normal    Heart Rhythm: regular     Heart Sounds: S1: normal  S2: normal  S3: absent   S4: absent  Opening Snap: absent    Pericardial Rub:  Absent: .    Abdomen:   Appearance: normal .  Palpation: Soft, non-tender to palpation, bowel sounds positive in all four quadrants; no guarding or rebound tenderness  Extremity: no edema.   LE Skin: no rashes  LE Hair:  normal  LE Pulses: well perfused with normal pulses in the distal extremities  Pallor on elevation: Absent. Rubor on dependency: None      DATA REVIEWED:     EKG. I personally reviewed and interpreted " the EKG.  Sinus bradycardia first-degree AV block.  Right bundle branch block.    ECG/EMG Results (all)     None        ---------------------------------------------------  TTE/ASHLYN:  Results for orders placed in visit on 10/25/19   Adult Transthoracic Echo Complete W/ Cont if Necessary Per Protocol    Narrative · Left ventricular wall thickness is consistent with borderline concentric   hypertrophy.  · Left ventricular diastolic dysfunction (grade I) consistent with   impaired relaxation.  · Left ventricular systolic function is normal.  · Estimated EF appears to be in the range of 56 - 60%.          --------------------------------------------------------------------------------------------------  LABS:     The CVD Risk score (Ela, et al., 2008) failed to calculate for the following reasons:    The patient has a prior MI, stroke, CHF, or peripheral vascular disease diagnosis         Lab Results   Component Value Date    GLUCOSE 95 09/30/2020    BUN 16 09/30/2020    CREATININE 0.79 09/30/2020    EGFRIFNONA 97 09/30/2020    BCR 20.3 09/30/2020    K 3.9 09/30/2020    CO2 25.1 09/30/2020    CALCIUM 9.6 09/30/2020    ALBUMIN 4.20 09/30/2020    AST 72 (H) 09/30/2020    ALT 92 (H) 09/30/2020     Lab Results   Component Value Date    WBC 5.52 09/30/2020    HGB 15.1 09/30/2020    HCT 43.3 09/30/2020    MCV 87.8 09/30/2020     09/30/2020     Lab Results   Component Value Date    CHOL 109 09/30/2020    CHLPL 123 01/29/2016    TRIG 136 09/30/2020    HDL 44 09/30/2020    LDL 38 09/30/2020     Lab Results   Component Value Date    TSH 2.890 08/02/2018     Lab Results   Component Value Date    CKTOTAL 190 (H) 08/02/2018     Lab Results   Component Value Date    HGBA1C 5.6 01/29/2016     No results found for: DDIMER  Lab Results   Component Value Date    ALT 92 (H) 09/30/2020     Lab Results   Component Value Date    HGBA1C 5.6 01/29/2016     Lab Results   Component Value Date    CREATININE 0.79 09/30/2020     Lab  Results   Component Value Date    IRON 79 03/13/2014    TIBC 320 03/13/2014     No results found for: INR, PROTIME     Interpretation Summary    · Left ventricular wall thickness is consistent with borderline concentric hypertrophy.  · Left ventricular diastolic dysfunction (grade I) consistent with impaired relaxation.  · Left ventricular systolic function is normal.  · Estimated EF appears to be in the range of 56 - 60%.         Assessment/Plan   CAD status post CABG-history of stent placement to circumflex proximal artery in 2013 asked to take a regular aspirin and he had a stress test in January 2017 which was unremarkable we'll continue medical therapy.  Echo was with normal LV systolic function     Hypertension continue the Toprol in the evening and Hyzaar in the morning.     Hyperlipidemia on atorvastatin doing okay.  And he was taken a regular aspirin every day.  LDL was less than 50 in 09/2020     Follow-up in 9 months.      Prevention:  Patient's Body mass index is 35 kg/m². BMI is above normal parameters. Recommendations include: exercise counseling and nutrition counseling.      Robinson Quiroz is not a smoker    AAA Screening:      IMPRESSION: 05/2017  CONCLUSION:  No abdominal aortic aneurysm.          This document has been electronically signed by Manuel Alcaraz MD on October 16, 2020 10:46 CDT

## 2020-11-05 LAB
QT INTERVAL: 468 MS
QTC INTERVAL: 451 MS

## 2020-11-05 RX ORDER — METOPROLOL SUCCINATE 50 MG/1
TABLET, EXTENDED RELEASE ORAL
Qty: 90 TABLET | Refills: 1 | Status: SHIPPED | OUTPATIENT
Start: 2020-11-05 | End: 2021-05-04

## 2020-11-05 RX ORDER — ATORVASTATIN CALCIUM 80 MG/1
TABLET, FILM COATED ORAL
Qty: 90 TABLET | Refills: 1 | Status: SHIPPED | OUTPATIENT
Start: 2020-11-05 | End: 2021-05-04

## 2020-11-12 ENCOUNTER — OFFICE VISIT (OUTPATIENT)
Dept: FAMILY MEDICINE CLINIC | Facility: CLINIC | Age: 70
End: 2020-11-12

## 2020-11-12 VITALS
HEIGHT: 75 IN | OXYGEN SATURATION: 97 % | BODY MASS INDEX: 34.76 KG/M2 | HEART RATE: 66 BPM | DIASTOLIC BLOOD PRESSURE: 74 MMHG | SYSTOLIC BLOOD PRESSURE: 132 MMHG | WEIGHT: 279.6 LBS | RESPIRATION RATE: 18 BRPM

## 2020-11-12 DIAGNOSIS — E78.00 PURE HYPERCHOLESTEROLEMIA: Primary | ICD-10-CM

## 2020-11-25 DIAGNOSIS — G62.9 PERIPHERAL POLYNEUROPATHY: ICD-10-CM

## 2020-11-25 RX ORDER — PREGABALIN 50 MG/1
50 CAPSULE ORAL 2 TIMES DAILY
Qty: 180 CAPSULE | Refills: 1 | Status: SHIPPED | OUTPATIENT
Start: 2020-11-25 | End: 2021-05-24

## 2020-11-30 ENCOUNTER — OFFICE VISIT (OUTPATIENT)
Dept: FAMILY MEDICINE CLINIC | Facility: CLINIC | Age: 70
End: 2020-11-30

## 2020-11-30 VITALS
DIASTOLIC BLOOD PRESSURE: 74 MMHG | WEIGHT: 280 LBS | SYSTOLIC BLOOD PRESSURE: 138 MMHG | OXYGEN SATURATION: 99 % | HEART RATE: 52 BPM | BODY MASS INDEX: 34.82 KG/M2 | HEIGHT: 75 IN

## 2020-11-30 DIAGNOSIS — E78.00 PURE HYPERCHOLESTEROLEMIA: Primary | ICD-10-CM

## 2020-11-30 DIAGNOSIS — I10 ESSENTIAL HYPERTENSION: ICD-10-CM

## 2020-11-30 PROCEDURE — 99214 OFFICE O/P EST MOD 30 MIN: CPT | Performed by: FAMILY MEDICINE

## 2020-11-30 NOTE — PROGRESS NOTES
Subjective   Robinson Quiroz is a 70 y.o. male   Cc: follow up of chronic medical issues    Hypertension  This is a chronic problem. The current episode started more than 1 year ago. The problem has been gradually improving since onset. Pertinent negatives include no anxiety, blurred vision, chest pain, headaches, malaise/fatigue, neck pain, orthopnea, PND or shortness of breath. Current antihypertension treatment includes beta blockers, alpha 1 blockers and diuretics.   Hyperlipidemia  This is a chronic problem. The current episode started more than 1 year ago. Recent lipid tests were reviewed and are variable. Pertinent negatives include no chest pain or shortness of breath. Current antihyperlipidemic treatment includes statins.       The following portions of the patient's history were reviewed and updated as appropriate: allergies, current medications, past family history, past medical history, past social history, past surgical history and problem list.    Review of Systems   Constitutional: Negative for malaise/fatigue.   Eyes: Negative for blurred vision.   Respiratory: Negative for shortness of breath.    Cardiovascular: Negative for chest pain, orthopnea and PND.   Musculoskeletal: Negative for neck pain.   Neurological: Negative for headaches.       Objective   Physical Exam  Vitals signs reviewed.   Constitutional:       Appearance: Normal appearance.   HENT:      Head: Normocephalic and atraumatic.      Right Ear: Tympanic membrane, ear canal and external ear normal.      Left Ear: Tympanic membrane, ear canal and external ear normal.      Nose: Nose normal.      Mouth/Throat:      Mouth: Mucous membranes are moist.      Pharynx: Oropharynx is clear.   Eyes:      Extraocular Movements: Extraocular movements intact.      Pupils: Pupils are equal, round, and reactive to light.   Neck:      Musculoskeletal: Normal range of motion and neck supple.   Cardiovascular:      Rate and Rhythm: Normal rate and  "regular rhythm.      Heart sounds: Normal heart sounds. No murmur. No friction rub. No gallop.    Pulmonary:      Effort: Pulmonary effort is normal. No respiratory distress.      Breath sounds: Normal breath sounds. No stridor. No wheezing, rhonchi or rales.   Chest:      Chest wall: No tenderness.   Abdominal:      General: Abdomen is flat. Bowel sounds are normal. There is no distension.      Palpations: Abdomen is soft.      Tenderness: There is no abdominal tenderness.   Skin:     General: Skin is warm and dry.   Neurological:      Mental Status: He is alert.           Visit Vitals  /74   Pulse 52   Ht 190.5 cm (75\")   Wt 127 kg (280 lb)   SpO2 99%   BMI 35.00 kg/m²     Body mass index is 35 kg/m².      Assessment/Plan   Diagnoses and all orders for this visit:    1. Pure hypercholesterolemia (Primary)  -     Lipid panel; Future    2. Essential hypertension  -     Comprehensive metabolic panel; Future  -     CBC w AUTO Differential; Future    Return to the clinic in 3 month/s.  Will contact with results as needed.    "

## 2020-12-01 NOTE — PROGRESS NOTES
"Subjective   Robinson Quiroz is a 70 y.o. male.     HPI The patient eft before being seen.  The following portions of the patient's history were reviewed and updated as appropriate: allergies, current medications, past family history, past medical history, past social history, past surgical history and problem list.    Review of Systems    Objective   Physical Exam      Visit Vitals  /74 (BP Location: Left arm, Patient Position: Sitting, Cuff Size: Adult)   Pulse 66   Resp 18   Ht 190.5 cm (75\")   Wt 127 kg (279 lb 9.6 oz)   SpO2 97%   BMI 34.95 kg/m²     Body mass index is 34.95 kg/m².      Assessment/Plan   Diagnoses and all orders for this visit:    1. Pure hypercholesterolemia (Primary)      "

## 2021-01-26 ENCOUNTER — TELEPHONE (OUTPATIENT)
Dept: FAMILY MEDICINE CLINIC | Facility: CLINIC | Age: 71
End: 2021-01-26

## 2021-02-08 RX ORDER — LOSARTAN POTASSIUM AND HYDROCHLOROTHIAZIDE 12.5; 5 MG/1; MG/1
TABLET ORAL
Qty: 90 TABLET | Refills: 1 | Status: SHIPPED | OUTPATIENT
Start: 2021-02-08 | End: 2021-06-07 | Stop reason: SDUPTHER

## 2021-02-12 ENCOUNTER — TELEPHONE (OUTPATIENT)
Dept: FAMILY MEDICINE CLINIC | Facility: CLINIC | Age: 71
End: 2021-02-12

## 2021-02-12 RX ORDER — TAMSULOSIN HYDROCHLORIDE 0.4 MG/1
1 CAPSULE ORAL DAILY
Qty: 90 CAPSULE | Refills: 2 | Status: SHIPPED | OUTPATIENT
Start: 2021-02-12 | End: 2021-09-07 | Stop reason: SDUPTHER

## 2021-02-12 NOTE — TELEPHONE ENCOUNTER
Needing a refill on his tamsulosin called into [facility].

## 2021-02-24 ENCOUNTER — LAB (OUTPATIENT)
Dept: LAB | Facility: HOSPITAL | Age: 71
End: 2021-02-24

## 2021-02-24 DIAGNOSIS — I10 ESSENTIAL HYPERTENSION: ICD-10-CM

## 2021-02-24 DIAGNOSIS — E78.00 PURE HYPERCHOLESTEROLEMIA: ICD-10-CM

## 2021-02-24 LAB
ALBUMIN SERPL-MCNC: 4.3 G/DL (ref 3.5–5.2)
ALBUMIN/GLOB SERPL: 1.7 G/DL
ALP SERPL-CCNC: 85 U/L (ref 39–117)
ALT SERPL W P-5'-P-CCNC: 67 U/L (ref 1–41)
ANION GAP SERPL CALCULATED.3IONS-SCNC: 10.7 MMOL/L (ref 5–15)
AST SERPL-CCNC: 52 U/L (ref 1–40)
BASOPHILS # BLD AUTO: 0.07 10*3/MM3 (ref 0–0.2)
BASOPHILS NFR BLD AUTO: 1.3 % (ref 0–1.5)
BILIRUB SERPL-MCNC: 0.6 MG/DL (ref 0–1.2)
BUN SERPL-MCNC: 12 MG/DL (ref 8–23)
BUN/CREAT SERPL: 15.2 (ref 7–25)
CALCIUM SPEC-SCNC: 9.4 MG/DL (ref 8.6–10.5)
CHLORIDE SERPL-SCNC: 100 MMOL/L (ref 98–107)
CHOLEST SERPL-MCNC: 99 MG/DL (ref 0–200)
CO2 SERPL-SCNC: 26.3 MMOL/L (ref 22–29)
CREAT SERPL-MCNC: 0.79 MG/DL (ref 0.76–1.27)
DEPRECATED RDW RBC AUTO: 38.8 FL (ref 37–54)
EOSINOPHIL # BLD AUTO: 0.28 10*3/MM3 (ref 0–0.4)
EOSINOPHIL NFR BLD AUTO: 5.2 % (ref 0.3–6.2)
ERYTHROCYTE [DISTWIDTH] IN BLOOD BY AUTOMATED COUNT: 12.4 % (ref 12.3–15.4)
GFR SERPL CREATININE-BSD FRML MDRD: 97 ML/MIN/1.73
GLOBULIN UR ELPH-MCNC: 2.6 GM/DL
GLUCOSE SERPL-MCNC: 96 MG/DL (ref 65–99)
HCT VFR BLD AUTO: 41 % (ref 37.5–51)
HDLC SERPL-MCNC: 43 MG/DL (ref 40–60)
HGB BLD-MCNC: 14.6 G/DL (ref 13–17.7)
IMM GRANULOCYTES # BLD AUTO: 0.02 10*3/MM3 (ref 0–0.05)
IMM GRANULOCYTES NFR BLD AUTO: 0.4 % (ref 0–0.5)
LDLC SERPL CALC-MCNC: 34 MG/DL (ref 0–100)
LDLC/HDLC SERPL: 0.74 {RATIO}
LYMPHOCYTES # BLD AUTO: 1.8 10*3/MM3 (ref 0.7–3.1)
LYMPHOCYTES NFR BLD AUTO: 33.5 % (ref 19.6–45.3)
MCH RBC QN AUTO: 30.9 PG (ref 26.6–33)
MCHC RBC AUTO-ENTMCNC: 35.6 G/DL (ref 31.5–35.7)
MCV RBC AUTO: 86.9 FL (ref 79–97)
MONOCYTES # BLD AUTO: 0.74 10*3/MM3 (ref 0.1–0.9)
MONOCYTES NFR BLD AUTO: 13.8 % (ref 5–12)
NEUTROPHILS NFR BLD AUTO: 2.46 10*3/MM3 (ref 1.7–7)
NEUTROPHILS NFR BLD AUTO: 45.8 % (ref 42.7–76)
NRBC BLD AUTO-RTO: 0 /100 WBC (ref 0–0.2)
PLATELET # BLD AUTO: 170 10*3/MM3 (ref 140–450)
PMV BLD AUTO: 10.7 FL (ref 6–12)
POTASSIUM SERPL-SCNC: 3.9 MMOL/L (ref 3.5–5.2)
PROT SERPL-MCNC: 6.9 G/DL (ref 6–8.5)
RBC # BLD AUTO: 4.72 10*6/MM3 (ref 4.14–5.8)
SODIUM SERPL-SCNC: 137 MMOL/L (ref 136–145)
TRIGL SERPL-MCNC: 121 MG/DL (ref 0–150)
VLDLC SERPL-MCNC: 22 MG/DL (ref 5–40)
WBC # BLD AUTO: 5.37 10*3/MM3 (ref 3.4–10.8)

## 2021-02-24 PROCEDURE — 80061 LIPID PANEL: CPT

## 2021-02-24 PROCEDURE — 85025 COMPLETE CBC W/AUTO DIFF WBC: CPT

## 2021-02-24 PROCEDURE — 36415 COLL VENOUS BLD VENIPUNCTURE: CPT

## 2021-02-24 PROCEDURE — 80053 COMPREHEN METABOLIC PANEL: CPT

## 2021-02-25 ENCOUNTER — OFFICE VISIT (OUTPATIENT)
Dept: SLEEP MEDICINE | Facility: HOSPITAL | Age: 71
End: 2021-02-25

## 2021-02-25 VITALS
OXYGEN SATURATION: 98 % | DIASTOLIC BLOOD PRESSURE: 66 MMHG | HEART RATE: 57 BPM | BODY MASS INDEX: 35.01 KG/M2 | HEIGHT: 75 IN | SYSTOLIC BLOOD PRESSURE: 136 MMHG | WEIGHT: 281.6 LBS

## 2021-02-25 DIAGNOSIS — G47.33 OBSTRUCTIVE SLEEP APNEA, ADULT: Primary | ICD-10-CM

## 2021-02-25 PROCEDURE — 99212 OFFICE O/P EST SF 10 MIN: CPT | Performed by: NURSE PRACTITIONER

## 2021-02-25 NOTE — PROGRESS NOTES
Sleep Clinic Follow Up    Date: 2021  Primary Care Provider: Merrick Gorman MD    Last office visit: 2020 (I reviewed this note)    CC: Follow up: JAVIER on CPAP      Interim History:  Since the last visit:    1) moderate JAVIER -  Robinson Quiroz has remained compliant with CPAP. He denies mask issues,  headaches, or pressures intolerance. He denies abnormal dreams, sleep paralysis, nasal congestion, URI sx.    He wants a new machine. He has had his current machine for almost 7 years.    He has dry mouth sometimes. He has used a chin strap and does not like it.       2) Patient reports rare RLS symptoms.         Sleep Testin. PSG on 04/10/2013, AHI of 18   2. CPAP titration on same day, recommended 10 cm H2O   3. Currently on 9 cm H2O    PAP Data:    Time frame: 2020-2021   Compliance: 99.5 %  Average use on days used: 7hrs 11 min  Percent of days with usage greater than or equal to 4 hours: 97.8%  PAP range: 9 cm H2O  Average 90% pressure: 9 cmH2O  Leak: 0 minutes  Average AHI: 1.4 events/hr  Mask type: Nasal pillows  DME: [facility]    Bed time: 9121-9656  Sleep latency: 0-5 minutes  Number of times awakens during the night: 0-1  Wake time: 4299-6579  Estimated total sleep time at night: 5-7 hours  Caffeine intake: 3 cups of decaf coffee, 0 cups of tea, and 0-1 sodas per day  Alcohol intake: 0 drinks per week  Nap time: denies   Sleepiness with Driving: denies      [place] - 5        PMHx, FH, SH reviewed and pertinent changes are:  unchanged from last office visit on 2020      REVIEW OF SYSTEMS:   Negative for chest pain, SOA, fever, chills, cough, N/V/D, abdominal pain.    Smoking:none             Exam:  Vitals:    21 1444   BP: 136/66   Pulse: 57   SpO2: 98%           21  1444   Weight: 128 kg (281 lb 9.6 oz)     Body mass index is 35.2 kg/m². Patient's Body mass index is 35.2 kg/m². BMI is above normal parameters. Recommendations include: referral to primary  care.      Gen:                No distress, conversant, pleasant, appears stated age, alert, oriented  Eyes:               Anicteric sclera, moist conjunctiva, no lid lag                           EOMI   Lungs:             normal effort, non-labored breathing                          Clear to auscultation bilaterally          CV:                  Normal S1/S2, no murmur                          no lower extremity edema                Psych:             Appropriate affect  Neuro:             CN 2-12 appear intact    Past Medical History:   Diagnosis Date   • Arthritis    • Coronary artery disease    • History of echocardiogram 09/13/2012    NRL LV systolic function w/ EF 60%. Mild CLVH. NRL diastolic function of LV myocardium. Left atrial enlargement   • HL (hearing loss)    • Hyperlipidemia    • Hypertension    • Kidney stone    • Obesity    • Pneumonia    • Stroke (CMS/HCC)        Current Outpatient Medications:   •  aspirin 81 MG tablet, Take 1 tablet by mouth Daily., Disp: 30 tablet, Rfl: 11  •  atorvastatin (LIPITOR) 80 MG tablet, TAKE 1 TABLET BY MOUTH EVERY DAY, Disp: 90 tablet, Rfl: 1  •  b complex-C-folic acid 1 MG capsule, Take 1 capsule by mouth Daily., Disp: , Rfl:   •  losartan-hydrochlorothiazide (HYZAAR) 50-12.5 MG per tablet, TAKE 1 TABLET BY MOUTH EVERY DAY, Disp: 90 tablet, Rfl: 1  •  metoprolol succinate XL (TOPROL-XL) 50 MG 24 hr tablet, TAKE 1 TABLET BY MOUTH EVERY DAY, Disp: 90 tablet, Rfl: 1  •  Multiple Vitamins-Minerals (MULTIVITAMIN ADULTS 50+ PO), Take 1 tablet by mouth., Disp: , Rfl:   •  Omega-3 Fatty Acids (FISH OIL) 1000 MG capsule capsule, Take 1,200 mg by mouth Daily With Breakfast., Disp: , Rfl:   •  pregabalin (Lyrica) 50 MG capsule, Take 1 capsule by mouth 2 (Two) Times a Day., Disp: 180 capsule, Rfl: 1  •  tamsulosin (FLOMAX) 0.4 MG capsule 24 hr capsule, Take 1 capsule by mouth Daily., Disp: 90 capsule, Rfl: 2      Assessment and Plan:    1. Obstructive sleep apnea  -Established,  stable (1)  1. Compliant with PAP therapy  2. Continue PAP as prescribed, script for new machine with setting of 9 cm H2O  3. Recommend humidifier in bedroom and Biotene oral rinse for dry mouth   4. Script for PAP supplies  5. Return to clinic in 3  months with compliance report unless change in symptoms in interim period  2. Restless leg syndrome/Periodic limb movement disorder (RLS/PLMD) - self-limited or minor problem          I spent 15 minutes caring for Robinson on this date of service. This time includes time spent by me in the following activities: preparing for the visit, reviewing tests, obtaining and/or reviewing a separately obtained history, performing a medically appropriate examination and/or evaluation, counseling and educating the patient/family/caregiver, ordering medications, tests, or procedures and documenting information in the medical record.           This document has been electronically signed by KWESI Canada on February 25, 2021 14:45 CST            CC: Merrick Gorman MD          No ref. provider found

## 2021-03-03 ENCOUNTER — IMMUNIZATION (OUTPATIENT)
Dept: VACCINE CLINIC | Facility: HOSPITAL | Age: 71
End: 2021-03-03

## 2021-03-03 PROCEDURE — 91300 HC SARSCOV02 VAC 30MCG/0.3ML IM: CPT | Performed by: THORACIC SURGERY (CARDIOTHORACIC VASCULAR SURGERY)

## 2021-03-03 PROCEDURE — 0001A: CPT | Performed by: THORACIC SURGERY (CARDIOTHORACIC VASCULAR SURGERY)

## 2021-03-04 ENCOUNTER — OFFICE VISIT (OUTPATIENT)
Dept: FAMILY MEDICINE CLINIC | Facility: CLINIC | Age: 71
End: 2021-03-04

## 2021-03-04 VITALS
HEIGHT: 75 IN | OXYGEN SATURATION: 98 % | HEART RATE: 58 BPM | WEIGHT: 280.06 LBS | DIASTOLIC BLOOD PRESSURE: 60 MMHG | SYSTOLIC BLOOD PRESSURE: 120 MMHG | BODY MASS INDEX: 34.82 KG/M2

## 2021-03-04 DIAGNOSIS — I10 ESSENTIAL HYPERTENSION: Primary | ICD-10-CM

## 2021-03-04 DIAGNOSIS — E78.00 PURE HYPERCHOLESTEROLEMIA: ICD-10-CM

## 2021-03-04 PROCEDURE — 99214 OFFICE O/P EST MOD 30 MIN: CPT | Performed by: FAMILY MEDICINE

## 2021-03-04 NOTE — PROGRESS NOTES
Subjective   Robinson Quiroz is a 71 y.o. male.   Cc: follow up of chronic medical issues    Hypertension  This is a chronic problem. The current episode started more than 1 year ago. The problem has been gradually improving since onset. Pertinent negatives include no anxiety, blurred vision, chest pain, headaches, malaise/fatigue, neck pain, orthopnea, palpitations, peripheral edema, PND, shortness of breath or sweats. Current antihypertension treatment includes beta blockers.   Hyperlipidemia  This is a chronic problem. The current episode started more than 1 year ago. Recent lipid tests were reviewed and are variable. Pertinent negatives include no chest pain or shortness of breath. Current antihyperlipidemic treatment includes statins.       The following portions of the patient's history were reviewed and updated as appropriate: allergies, current medications, past family history, past medical history, past social history, past surgical history and problem list.    Review of Systems   Constitutional: Negative for malaise/fatigue.   Eyes: Negative for blurred vision.   Respiratory: Negative for shortness of breath.    Cardiovascular: Negative for chest pain, palpitations, orthopnea and PND.   Musculoskeletal: Negative for neck pain.   Neurological: Negative for headaches.       Objective   Physical Exam  Constitutional:       Appearance: Normal appearance.   HENT:      Head: Normocephalic and atraumatic.      Right Ear: Tympanic membrane, ear canal and external ear normal.      Left Ear: Tympanic membrane, ear canal and external ear normal.      Nose: Nose normal.      Mouth/Throat:      Mouth: Mucous membranes are moist.      Pharynx: Oropharynx is clear.   Eyes:      Extraocular Movements: Extraocular movements intact.      Pupils: Pupils are equal, round, and reactive to light.   Neck:      Musculoskeletal: Normal range of motion.   Cardiovascular:      Rate and Rhythm: Normal rate and regular rhythm.       "Heart sounds: Normal heart sounds. No murmur. No friction rub. No gallop.    Pulmonary:      Effort: Pulmonary effort is normal. No respiratory distress.      Breath sounds: Normal breath sounds. No stridor. No wheezing, rhonchi or rales.   Chest:      Chest wall: No tenderness.   Abdominal:      General: Abdomen is flat. Bowel sounds are normal. There is no distension.      Palpations: Abdomen is soft.      Tenderness: There is no abdominal tenderness. There is no guarding.   Skin:     General: Skin is warm and dry.   Neurological:      Mental Status: He is alert.           Visit Vitals  /60   Pulse 58   Ht 190.5 cm (75\")   Wt 127 kg (280 lb 1 oz)   SpO2 98%   BMI 35.01 kg/m²     Body mass index is 35.01 kg/m².      Assessment/Plan   Diagnoses and all orders for this visit:    1. Essential hypertension (Primary)  -     Comprehensive metabolic panel; Future  -     CBC w AUTO Differential; Future    2. Pure hypercholesterolemia  -     Lipid panel; Future    Return to the clinic in 3 month/s.  Will contact with results as needed.    "

## 2021-03-24 ENCOUNTER — IMMUNIZATION (OUTPATIENT)
Dept: VACCINE CLINIC | Facility: HOSPITAL | Age: 71
End: 2021-03-24

## 2021-03-24 PROCEDURE — 0002A: CPT | Performed by: THORACIC SURGERY (CARDIOTHORACIC VASCULAR SURGERY)

## 2021-03-24 PROCEDURE — 91300 HC SARSCOV02 VAC 30MCG/0.3ML IM: CPT | Performed by: THORACIC SURGERY (CARDIOTHORACIC VASCULAR SURGERY)

## 2021-03-25 DIAGNOSIS — I10 ESSENTIAL HYPERTENSION: Primary | ICD-10-CM

## 2021-04-16 NOTE — TELEPHONE ENCOUNTER
Pt needs a refill on atorvastatin 80 mg, metoprolol 50 mg sent to [facility].    (1) Oriented to own ability

## 2021-05-04 RX ORDER — METOPROLOL SUCCINATE 50 MG/1
TABLET, EXTENDED RELEASE ORAL
Qty: 90 TABLET | Refills: 1 | Status: SHIPPED | OUTPATIENT
Start: 2021-05-04 | End: 2021-09-07 | Stop reason: SDUPTHER

## 2021-05-04 RX ORDER — ATORVASTATIN CALCIUM 80 MG/1
TABLET, FILM COATED ORAL
Qty: 90 TABLET | Refills: 1 | Status: SHIPPED | OUTPATIENT
Start: 2021-05-04 | End: 2021-09-07 | Stop reason: SDUPTHER

## 2021-05-24 DIAGNOSIS — G62.9 PERIPHERAL POLYNEUROPATHY: ICD-10-CM

## 2021-05-24 RX ORDER — PREGABALIN 50 MG/1
50 CAPSULE ORAL 2 TIMES DAILY
Qty: 180 CAPSULE | Refills: 1 | Status: SHIPPED | OUTPATIENT
Start: 2021-05-24 | End: 2021-06-07 | Stop reason: SDUPTHER

## 2021-05-25 ENCOUNTER — OFFICE VISIT (OUTPATIENT)
Dept: SLEEP MEDICINE | Facility: HOSPITAL | Age: 71
End: 2021-05-25

## 2021-05-25 VITALS
SYSTOLIC BLOOD PRESSURE: 144 MMHG | WEIGHT: 282.5 LBS | BODY MASS INDEX: 35.13 KG/M2 | HEIGHT: 75 IN | OXYGEN SATURATION: 97 % | HEART RATE: 58 BPM | DIASTOLIC BLOOD PRESSURE: 61 MMHG

## 2021-05-25 DIAGNOSIS — G47.33 OBSTRUCTIVE SLEEP APNEA, ADULT: Primary | ICD-10-CM

## 2021-05-25 PROCEDURE — 99212 OFFICE O/P EST SF 10 MIN: CPT | Performed by: NURSE PRACTITIONER

## 2021-05-25 NOTE — PROGRESS NOTES
Sleep Clinic Follow Up    Date: 2021  Primary Care Provider: Merrick Gorman MD    Last office visit: 2021 (I reviewed this note)    CC: Follow up: JAVIER on CPAP, new machine follow-up       Interim History:  Since the last visit:    1) moderate JAVIER -  Robinson Quiroz has remained compliant with CPAP. He denies mask and machine issues, dry mouth, headaches, or pressures intolerance. He denies abnormal dreams, sleep paralysis, nasal congestion, URI sx.      2) Patient reports rare RLS symptoms.     Sleep Testin. PSG on 04/10/2013, AHI of 18   2. CPAP titration on same day, recommended 10 cm H2O   3. Currently on 9 cm H2O    PAP Data:    Time frame: 2021-2021   Compliance: 84.4 %  Average use on days used: 7hrs 53 min  Percent of days with usage greater than or equal to 4 hours: 83.3%  PAP range: 9 cm H2O  Average 90% pressure: 9 cmH2O  Leak: 0 minutes  Average AHI: 1.1 events/hr  Mask type: Nasal pillows  DME: [facility]    Bed time: 2300  Sleep latency: 0-5 minutes  Number of times awakens during the night: 1  Wake time: 0700  Estimated total sleep time at night: 7-8 hours  Caffeine intake: 3 cups of coffee, 0 cups of tea, and 1 sodas per day  Alcohol intake: 0 drinks per week  Nap time: denies    Sleepiness with Driving: denies      [place] - 6        PMHx, FH, SH reviewed and pertinent changes are:  unchanged from last office visit on 2021      REVIEW OF SYSTEMS:   Negative for chest pain, SOA, fever, chills, cough, N/V/D, abdominal pain.    Smoking:none           Exam:  Vitals:    21 1318   BP: 144/61   Pulse: 58   SpO2: 97%           21  1318   Weight: 128 kg (282 lb 8 oz)     Body mass index is 35.31 kg/m². Patient's Body mass index is 35.31 kg/m². indicating that he is obese (BMI >30). Obesity-related health conditions include the following: obstructive sleep apnea and hypertension. Obesity is unchanged. BMI is is above average; BMI management plan is  completed. We discussed portion control and increasing exercise..      Gen:                No distress, conversant, pleasant, appears stated age, alert, oriented  Eyes:               Anicteric sclera, moist conjunctiva, no lid lag                           EOMI   Lungs:             normal effort, non-labored breathing                          Clear to auscultation bilaterally          CV:                  Normal S1/S2, no murmur                          no lower extremity edema            Psych:             Appropriate affect  Neuro:             CN 2-12 appear intact    Past Medical History:   Diagnosis Date   • Arthritis    • Coronary artery disease    • History of echocardiogram 09/13/2012    NRL LV systolic function w/ EF 60%. Mild CLVH. NRL diastolic function of LV myocardium. Left atrial enlargement   • HL (hearing loss)    • Hyperlipidemia    • Hypertension    • Kidney stone    • Obesity    • Pneumonia    • Stroke (CMS/HCC)        Current Outpatient Medications:   •  aspirin 81 MG tablet, Take 1 tablet by mouth Daily., Disp: 30 tablet, Rfl: 11  •  atorvastatin (LIPITOR) 80 MG tablet, TAKE 1 TABLET BY MOUTH EVERY DAY, Disp: 90 tablet, Rfl: 1  •  b complex-C-folic acid 1 MG capsule, Take 1 capsule by mouth Daily., Disp: , Rfl:   •  losartan-hydrochlorothiazide (HYZAAR) 50-12.5 MG per tablet, TAKE 1 TABLET BY MOUTH EVERY DAY, Disp: 90 tablet, Rfl: 1  •  metoprolol succinate XL (TOPROL-XL) 50 MG 24 hr tablet, TAKE 1 TABLET BY MOUTH EVERY DAY, Disp: 90 tablet, Rfl: 1  •  Multiple Vitamins-Minerals (MULTIVITAMIN ADULTS 50+ PO), Take 1 tablet by mouth., Disp: , Rfl:   •  Omega-3 Fatty Acids (FISH OIL) 1000 MG capsule capsule, Take 1,200 mg by mouth Daily With Breakfast., Disp: , Rfl:   •  pregabalin (LYRICA) 50 MG capsule, Take 1 capsule by mouth 2 (Two) Times a Day., Disp: 180 capsule, Rfl: 1  •  tamsulosin (FLOMAX) 0.4 MG capsule 24 hr capsule, Take 1 capsule by mouth Daily., Disp: 90 capsule, Rfl:  2      Assessment and Plan:    1. Obstructive sleep apnea  -Established, stable (1)  1. Compliant with PAP therapy  2. Continue PAP as prescribed  3. Script for PAP supplies  4. Return to clinic in 12 months with compliance report unless change in symptoms in interim period  2. Restless leg syndrome/Periodic limb movement disorder (RLS/PLMD) - self-limited or minor problem          I spent 15 minutes caring for Robinson on this date of service. This time includes time spent by me in the following activities: preparing for the visit, obtaining and/or reviewing a separately obtained history, performing a medically appropriate examination and/or evaluation, counseling and educating the patient/family/caregiver, ordering medications, tests, or procedures and documenting information in the medical record.           This document has been electronically signed by KWESI [place] on May 25, 2021 13:19 CDT            CC: Merrick Gorman MD          No ref. provider found

## 2021-05-27 ENCOUNTER — LAB (OUTPATIENT)
Dept: LAB | Facility: HOSPITAL | Age: 71
End: 2021-05-27

## 2021-05-27 DIAGNOSIS — I10 ESSENTIAL HYPERTENSION: ICD-10-CM

## 2021-05-27 DIAGNOSIS — E78.00 PURE HYPERCHOLESTEROLEMIA: ICD-10-CM

## 2021-05-27 LAB
ALBUMIN SERPL-MCNC: 4.4 G/DL (ref 3.5–5.2)
ALBUMIN/GLOB SERPL: 1.7 G/DL
ALP SERPL-CCNC: 84 U/L (ref 39–117)
ALT SERPL W P-5'-P-CCNC: 79 U/L (ref 1–41)
ANION GAP SERPL CALCULATED.3IONS-SCNC: 11 MMOL/L (ref 5–15)
AST SERPL-CCNC: 60 U/L (ref 1–40)
BILIRUB SERPL-MCNC: 0.7 MG/DL (ref 0–1.2)
BUN SERPL-MCNC: 10 MG/DL (ref 8–23)
BUN/CREAT SERPL: 12.3 (ref 7–25)
CALCIUM SPEC-SCNC: 9.7 MG/DL (ref 8.6–10.5)
CHLORIDE SERPL-SCNC: 101 MMOL/L (ref 98–107)
CHOLEST SERPL-MCNC: 107 MG/DL (ref 0–200)
CO2 SERPL-SCNC: 25 MMOL/L (ref 22–29)
CREAT SERPL-MCNC: 0.81 MG/DL (ref 0.76–1.27)
DEPRECATED RDW RBC AUTO: 41.8 FL (ref 37–54)
EOSINOPHIL # BLD MANUAL: 0.23 10*3/MM3 (ref 0–0.4)
EOSINOPHIL NFR BLD MANUAL: 4.1 % (ref 0.3–6.2)
ERYTHROCYTE [DISTWIDTH] IN BLOOD BY AUTOMATED COUNT: 12.9 % (ref 12.3–15.4)
GFR SERPL CREATININE-BSD FRML MDRD: 94 ML/MIN/1.73
GLOBULIN UR ELPH-MCNC: 2.6 GM/DL
GLUCOSE SERPL-MCNC: 100 MG/DL (ref 65–99)
HCT VFR BLD AUTO: 44.8 % (ref 37.5–51)
HDLC SERPL-MCNC: 46 MG/DL (ref 40–60)
HGB BLD-MCNC: 15.5 G/DL (ref 13–17.7)
LDLC SERPL CALC-MCNC: 37 MG/DL (ref 0–100)
LDLC/HDLC SERPL: 0.71 {RATIO}
LYMPHOCYTES # BLD MANUAL: 2.21 10*3/MM3 (ref 0.7–3.1)
LYMPHOCYTES NFR BLD MANUAL: 39.2 % (ref 19.6–45.3)
LYMPHOCYTES NFR BLD MANUAL: 5.2 % (ref 5–12)
MCH RBC QN AUTO: 31.1 PG (ref 26.6–33)
MCHC RBC AUTO-ENTMCNC: 34.6 G/DL (ref 31.5–35.7)
MCV RBC AUTO: 89.8 FL (ref 79–97)
MONOCYTES # BLD AUTO: 0.29 10*3/MM3 (ref 0.1–0.9)
NEUTROPHILS # BLD AUTO: 2.91 10*3/MM3 (ref 1.7–7)
NEUTROPHILS NFR BLD MANUAL: 51.5 % (ref 42.7–76)
PLAT MORPH BLD: NORMAL
PLATELET # BLD AUTO: 146 10*3/MM3 (ref 140–450)
PMV BLD AUTO: 11 FL (ref 6–12)
POTASSIUM SERPL-SCNC: 4.1 MMOL/L (ref 3.5–5.2)
PROT SERPL-MCNC: 7 G/DL (ref 6–8.5)
RBC # BLD AUTO: 4.99 10*6/MM3 (ref 4.14–5.8)
RBC MORPH BLD: NORMAL
SODIUM SERPL-SCNC: 137 MMOL/L (ref 136–145)
TRIGL SERPL-MCNC: 142 MG/DL (ref 0–150)
VLDLC SERPL-MCNC: 24 MG/DL (ref 5–40)
WBC # BLD AUTO: 5.65 10*3/MM3 (ref 3.4–10.8)
WBC MORPH BLD: NORMAL

## 2021-05-27 PROCEDURE — 85025 COMPLETE CBC W/AUTO DIFF WBC: CPT

## 2021-05-27 PROCEDURE — 36415 COLL VENOUS BLD VENIPUNCTURE: CPT

## 2021-05-27 PROCEDURE — 80053 COMPREHEN METABOLIC PANEL: CPT

## 2021-05-27 PROCEDURE — 85007 BL SMEAR W/DIFF WBC COUNT: CPT

## 2021-05-27 PROCEDURE — 80061 LIPID PANEL: CPT

## 2021-06-07 ENCOUNTER — OFFICE VISIT (OUTPATIENT)
Dept: FAMILY MEDICINE CLINIC | Facility: CLINIC | Age: 71
End: 2021-06-07

## 2021-06-07 VITALS
OXYGEN SATURATION: 97 % | HEART RATE: 60 BPM | SYSTOLIC BLOOD PRESSURE: 128 MMHG | BODY MASS INDEX: 34.83 KG/M2 | WEIGHT: 280.13 LBS | HEIGHT: 75 IN | DIASTOLIC BLOOD PRESSURE: 62 MMHG

## 2021-06-07 DIAGNOSIS — G62.9 PERIPHERAL POLYNEUROPATHY: ICD-10-CM

## 2021-06-07 DIAGNOSIS — I10 ESSENTIAL HYPERTENSION: Primary | ICD-10-CM

## 2021-06-07 DIAGNOSIS — E78.00 PURE HYPERCHOLESTEROLEMIA: ICD-10-CM

## 2021-06-07 PROCEDURE — 99214 OFFICE O/P EST MOD 30 MIN: CPT | Performed by: FAMILY MEDICINE

## 2021-06-07 RX ORDER — LOSARTAN POTASSIUM AND HYDROCHLOROTHIAZIDE 12.5; 5 MG/1; MG/1
1 TABLET ORAL DAILY
Qty: 90 TABLET | Refills: 2 | Status: SHIPPED | OUTPATIENT
Start: 2021-06-07 | End: 2021-12-07 | Stop reason: SDUPTHER

## 2021-06-07 RX ORDER — PREGABALIN 50 MG/1
50 CAPSULE ORAL 2 TIMES DAILY
Qty: 180 CAPSULE | Refills: 1 | Status: SHIPPED | OUTPATIENT
Start: 2021-06-07 | End: 2021-09-07 | Stop reason: SDUPTHER

## 2021-06-07 NOTE — PROGRESS NOTES
Subjective   Robinson Quiroz is a 71 y.o. male.   Cc: follow up of chronic medical issues      Hyperlipidemia  This is a chronic problem. The current episode started more than 1 year ago. Recent lipid tests were reviewed and are variable. Pertinent negatives include no chest pain or shortness of breath. Current antihyperlipidemic treatment includes statins.   Hypertension  This is a chronic problem. The current episode started more than 1 year ago. The problem has been gradually improving since onset. Pertinent negatives include no anxiety, blurred vision, chest pain, headaches, malaise/fatigue, neck pain, orthopnea, palpitations, peripheral edema, PND, shortness of breath or sweats. Current antihypertension treatment includes angiotensin blockers, diuretics and beta blockers.   Neueopathy-His burning in his feet at to,es. He currently feels as if his symptoms are under control.  The following portions of the patient's history were reviewed and updated as appropriate: allergies, current medications, past family history, past medical history, past social history, past surgical history and problem list.    Review of Systems   Constitutional: Negative for malaise/fatigue.   Eyes: Negative for blurred vision.   Respiratory: Negative for shortness of breath.    Cardiovascular: Negative for chest pain, palpitations, orthopnea and PND.   Musculoskeletal: Negative for neck pain.   Neurological: Negative for headaches.       Objective   Physical Exam  Vitals reviewed.   Constitutional:       Appearance: Normal appearance.   HENT:      Head: Normocephalic and atraumatic.      Right Ear: Tympanic membrane, ear canal and external ear normal.      Left Ear: Tympanic membrane, ear canal and external ear normal.      Nose: Nose normal.      Mouth/Throat:      Mouth: Mucous membranes are moist.      Pharynx: Oropharynx is clear.   Cardiovascular:      Rate and Rhythm: Normal rate and regular rhythm.      Heart sounds: Normal  "heart sounds. No murmur heard.   No gallop.    Pulmonary:      Effort: Pulmonary effort is normal. No respiratory distress.      Breath sounds: Normal breath sounds. No stridor. No wheezing, rhonchi or rales.   Chest:      Chest wall: No tenderness.   Abdominal:      General: Abdomen is flat. Bowel sounds are normal. There is no distension.      Palpations: Abdomen is soft. There is no mass.      Tenderness: There is no abdominal tenderness. There is no guarding or rebound.      Hernia: No hernia is present.   Musculoskeletal:      Cervical back: Normal range of motion.   Skin:     General: Skin is warm and dry.   Neurological:      Mental Status: He is alert.           Visit Vitals  /62   Pulse 60   Ht 190.5 cm (75\")   Wt 127 kg (280 lb 2 oz)   SpO2 97%   BMI 35.01 kg/m²     Body mass index is 35.01 kg/m².      Assessment/Plan   Diagnoses and all orders for this visit:    1. Essential hypertension (Primary)  -     Comprehensive metabolic panel; Future  -     CBC w MANUAL Differential; Future    2. Pure hypercholesterolemia  -     Lipid panel; Future    3. Peripheral polyneuropathy  -     pregabalin (LYRICA) 50 MG capsule; Take 1 capsule by mouth 2 (Two) Times a Day.  Dispense: 180 capsule; Refill: 1    Other orders  -     losartan-hydrochlorothiazide (HYZAAR) 50-12.5 MG per tablet; Take 1 tablet by mouth Daily.  Dispense: 90 tablet; Refill: 2    Return to the clinic in 3 month/s.  Will contact with results as needed.  [place] is appropriate.    "

## 2021-09-07 ENCOUNTER — OFFICE VISIT (OUTPATIENT)
Dept: FAMILY MEDICINE CLINIC | Facility: CLINIC | Age: 71
End: 2021-09-07

## 2021-09-07 VITALS
HEIGHT: 75 IN | HEART RATE: 64 BPM | DIASTOLIC BLOOD PRESSURE: 64 MMHG | OXYGEN SATURATION: 98 % | WEIGHT: 279.5 LBS | BODY MASS INDEX: 34.75 KG/M2 | SYSTOLIC BLOOD PRESSURE: 126 MMHG

## 2021-09-07 DIAGNOSIS — E78.00 PURE HYPERCHOLESTEROLEMIA: ICD-10-CM

## 2021-09-07 DIAGNOSIS — G62.9 PERIPHERAL POLYNEUROPATHY: ICD-10-CM

## 2021-09-07 DIAGNOSIS — I10 ESSENTIAL HYPERTENSION: Primary | ICD-10-CM

## 2021-09-07 PROCEDURE — 99214 OFFICE O/P EST MOD 30 MIN: CPT | Performed by: FAMILY MEDICINE

## 2021-09-07 RX ORDER — TAMSULOSIN HYDROCHLORIDE 0.4 MG/1
1 CAPSULE ORAL DAILY
Qty: 90 CAPSULE | Refills: 2 | Status: SHIPPED | OUTPATIENT
Start: 2021-09-07 | End: 2022-08-04

## 2021-09-07 RX ORDER — METOPROLOL SUCCINATE 50 MG/1
50 TABLET, EXTENDED RELEASE ORAL DAILY
Qty: 90 TABLET | Refills: 1 | Status: SHIPPED | OUTPATIENT
Start: 2021-09-07 | End: 2022-04-18

## 2021-09-07 RX ORDER — ATORVASTATIN CALCIUM 80 MG/1
80 TABLET, FILM COATED ORAL DAILY
Qty: 90 TABLET | Refills: 1 | Status: SHIPPED | OUTPATIENT
Start: 2021-09-07 | End: 2022-04-18

## 2021-09-07 RX ORDER — PREGABALIN 50 MG/1
50 CAPSULE ORAL 2 TIMES DAILY
Qty: 180 CAPSULE | Refills: 1 | Status: SHIPPED | OUTPATIENT
Start: 2021-09-07 | End: 2021-11-22

## 2021-09-07 NOTE — PROGRESS NOTES
Subjective   Robinson Quiroz is a 71 y.o. male.   .Cc: follow up of chronic medical issues    Hypertension  This is a chronic problem. The current episode started more than 1 year ago. The problem has been gradually improving since onset. Pertinent negatives include no anxiety, blurred vision, chest pain, headaches, malaise/fatigue, neck pain, orthopnea, palpitations, peripheral edema, PND, shortness of breath or sweats. Current antihypertension treatment includes angiotensin blockers and diuretics.   Hyperlipidemia  This is a chronic problem. The current episode started more than 1 year ago. Recent lipid tests were reviewed and are variable. Pertinent negatives include no chest pain or shortness of breath.     He has a history of Polyneuropathy. It is stable.  The following portions of the patient's history were reviewed and updated as appropriate: allergies, current medications, past family history, past medical history, past social history, past surgical history and problem list.    Review of Systems   Constitutional: Negative for fatigue, fever and malaise/fatigue.   Eyes: Negative for blurred vision.   Respiratory: Negative for cough and shortness of breath.    Cardiovascular: Negative for chest pain, palpitations, orthopnea and PND.   Musculoskeletal: Negative for neck pain.   Neurological: Negative for headaches.     Objective   Physical Exam  Vitals reviewed.   Constitutional:       Appearance: Normal appearance.   HENT:      Head: Normocephalic and atraumatic.      Right Ear: Tympanic membrane, ear canal and external ear normal.      Left Ear: Tympanic membrane, ear canal and external ear normal.      Nose: Nose normal.      Mouth/Throat:      Mouth: Mucous membranes are moist.      Pharynx: Oropharynx is clear.   Cardiovascular:      Rate and Rhythm: Normal rate and regular rhythm.      Heart sounds: Normal heart sounds. No murmur heard.   No friction rub. No gallop.    Pulmonary:      Effort: Pulmonary  "effort is normal. No respiratory distress.      Breath sounds: Normal breath sounds. No stridor. No wheezing, rhonchi or rales.   Chest:      Chest wall: No tenderness.   Abdominal:      General: Abdomen is flat. Bowel sounds are normal. There is no distension.      Palpations: Abdomen is soft. There is no mass.      Tenderness: There is no abdominal tenderness. There is no guarding or rebound.      Hernia: No hernia is present.   Musculoskeletal:      Cervical back: Normal range of motion.      Comments: Tender in the low back.   Skin:     General: Skin is warm and dry.   Neurological:      Mental Status: He is alert.           Visit Vitals  /64   Pulse 64   Ht 190.5 cm (75\")   Wt 127 kg (279 lb 8 oz)   SpO2 98%   BMI 34.94 kg/m²     Body mass index is 34.94 kg/m².      Assessment/Plan   Diagnoses and all orders for this visit:    1. Essential hypertension (Primary)  -     Comprehensive metabolic panel; Future  -     CBC w AUTO Differential; Future  -     Lipid panel; Future    2. Pure hypercholesterolemia    3. Peripheral polyneuropathy  -     pregabalin (LYRICA) 50 MG capsule; Take 1 capsule by mouth 2 (Two) Times a Day.  Dispense: 180 capsule; Refill: 1    Other orders  -     atorvastatin (LIPITOR) 80 MG tablet; Take 1 tablet by mouth Daily.  Dispense: 90 tablet; Refill: 1  -     metoprolol succinate XL (TOPROL-XL) 50 MG 24 hr tablet; Take 1 tablet by mouth Daily.  Dispense: 90 tablet; Refill: 1  -     tamsulosin (FLOMAX) 0.4 MG capsule 24 hr capsule; Take 1 capsule by mouth Daily.  Dispense: 90 capsule; Refill: 2    Return to the clinic in 3 month/s.  Will contact with results as needed.    "

## 2021-09-21 ENCOUNTER — DOCUMENTATION (OUTPATIENT)
Dept: SLEEP MEDICINE | Facility: HOSPITAL | Age: 71
End: 2021-09-21

## 2021-09-21 DIAGNOSIS — G47.33 OSA (OBSTRUCTIVE SLEEP APNEA): Primary | ICD-10-CM

## 2021-09-21 NOTE — PROGRESS NOTES
I spoke with patient over the phone. Name and  verified. I discussed with patient  new safety recall of [employer] CPAP/BiPAP/AVAPS machines. We discussed the risk versus benefit of continuing usage of PAP therapy. The company has recommended that patients stop usage of their current machines. Instructed patient to call [employer] ([phone]) to check if the machine is under warranty for repair or replacement. He registered machine yesterday. I recommended reaching out to insurance to see if new machine would be covered. He got his current machine in 2021. [employer] should be replacing machine but could be up to 1 year. DME supplying bacterial filter for machine if choosing to continue to wear despite recall. Follow up with DME company or sleep clinic regarding any further questions, or for consideration for new machine once eligible. Advised patient to avoid unapproved ozone-type CPAP . Advised to call us if any further questions or problems. Answered all of his questions and he verbalized understanding.

## 2021-10-12 ENCOUNTER — OFFICE VISIT (OUTPATIENT)
Dept: FAMILY MEDICINE CLINIC | Facility: CLINIC | Age: 71
End: 2021-10-12

## 2021-10-12 VITALS
WEIGHT: 279.44 LBS | BODY MASS INDEX: 34.74 KG/M2 | OXYGEN SATURATION: 98 % | SYSTOLIC BLOOD PRESSURE: 124 MMHG | HEIGHT: 75 IN | HEART RATE: 68 BPM | DIASTOLIC BLOOD PRESSURE: 72 MMHG

## 2021-10-12 DIAGNOSIS — R10.9 ABDOMINAL WALL PAIN: ICD-10-CM

## 2021-10-12 DIAGNOSIS — G89.29 CHRONIC LOW BACK PAIN, UNSPECIFIED BACK PAIN LATERALITY, UNSPECIFIED WHETHER SCIATICA PRESENT: Primary | ICD-10-CM

## 2021-10-12 DIAGNOSIS — M54.50 CHRONIC LOW BACK PAIN, UNSPECIFIED BACK PAIN LATERALITY, UNSPECIFIED WHETHER SCIATICA PRESENT: Primary | ICD-10-CM

## 2021-10-12 PROCEDURE — 96372 THER/PROPH/DIAG INJ SC/IM: CPT | Performed by: FAMILY MEDICINE

## 2021-10-12 PROCEDURE — 99214 OFFICE O/P EST MOD 30 MIN: CPT | Performed by: FAMILY MEDICINE

## 2021-10-12 RX ORDER — TRIAMCINOLONE ACETONIDE 40 MG/ML
80 INJECTION, SUSPENSION INTRA-ARTICULAR; INTRAMUSCULAR ONCE
Status: COMPLETED | OUTPATIENT
Start: 2021-10-12 | End: 2021-10-12

## 2021-10-12 RX ADMIN — TRIAMCINOLONE ACETONIDE 80 MG: 40 INJECTION, SUSPENSION INTRA-ARTICULAR; INTRAMUSCULAR at 12:47

## 2021-10-12 NOTE — PROGRESS NOTES
Subjective   Robinson Quiroz is a 71 y.o. male.    cc: back pain    Abdominal Pain  This is a chronic problem. The current episode started 1 to 4 weeks ago. The problem occurs daily. The pain is located in the RLQ. The pain is at a severity of 4/10. The quality of the pain is aching. Pertinent negatives include no anorexia, arthralgias, belching, constipation, diarrhea, dysuria, fever, flatus, frequency, headaches, hematochezia, hematuria or myalgias.   Back Pain  This is a chronic problem. The current episode started more than 1 year ago. The problem occurs daily. The pain is present in the lumbar spine. The quality of the pain is described as aching. The pain is at a severity of 8/10. The pain is the same all the time. The symptoms are aggravated by bending, twisting and standing. Associated symptoms include abdominal pain. Pertinent negatives include no bladder incontinence, bowel incontinence, chest pain, dysuria, fever, headaches, leg pain, pelvic pain, perianal numbness, tingling or weakness.   The abdominal pain is more in the abdominal wall. He has undergone 13 Chiropractic treatment. The pain worsened after the thirteenth treatment . It is with him all of the time.    The following portions of the patient's history were reviewed and updated as appropriate: allergies, current medications, past family history, past medical history, past social history, past surgical history and problem list.    Review of Systems   Constitutional: Negative for fever.   Cardiovascular: Negative for chest pain.   Gastrointestinal: Positive for abdominal pain. Negative for anorexia, bowel incontinence, constipation, diarrhea, flatus and hematochezia.   Genitourinary: Negative for bladder incontinence, dysuria, frequency, hematuria and pelvic pain.   Musculoskeletal: Positive for back pain and gait problem. Negative for arthralgias, joint swelling and myalgias.   Neurological: Negative for tingling, weakness and headaches.  "      Objective   Physical Exam  Vitals reviewed.   Constitutional:       Appearance: Normal appearance.   HENT:      Head: Normocephalic and atraumatic.      Right Ear: Tympanic membrane, ear canal and external ear normal.      Left Ear: Tympanic membrane, ear canal and external ear normal.      Nose: Nose normal.      Mouth/Throat:      Mouth: Mucous membranes are moist.      Pharynx: Oropharynx is clear.   Eyes:      General:         Right eye: Right eye discharge: x-ray.   Cardiovascular:      Rate and Rhythm: Normal rate and regular rhythm.      Heart sounds: Normal heart sounds. No murmur heard.  No friction rub. No gallop.    Pulmonary:      Effort: Pulmonary effort is normal. No respiratory distress.      Breath sounds: Normal breath sounds. No stridor. No wheezing, rhonchi or rales.   Chest:      Chest wall: No tenderness.   Abdominal:      Comments: Bowel sounds are normal. There is tenderness in the right lower quadrant more in the abdominal wall.   Musculoskeletal:      Cervical back: Normal range of motion.      Comments: Tenderness on palpation of the lower Lumbar area.   Skin:     General: Skin is warm and dry.   Neurological:      Mental Status: He is alert.           Visit Vitals  /72   Pulse 68   Ht 190.5 cm (75\")   Wt 127 kg (279 lb 7 oz)   SpO2 98%   BMI 34.93 kg/m²     Body mass index is 34.93 kg/m².      Assessment/Plan   Diagnoses and all orders for this visit:    1. Chronic low back pain, unspecified back pain laterality, unspecified whether sciatica present (Primary)  -     XR Spine Lumbar 4+ View; Future  -     triamcinolone acetonide (KENALOG-40) injection 80 mg    2. Abdominal wall pain  -     XR Abdomen Flat & Upright (In Office)    Will see how he does with the Kenalog shot.  Return to the clinic in 1 week/s.  Will contact with results as needed.    "

## 2021-10-16 ENCOUNTER — FLU SHOT (OUTPATIENT)
Dept: FAMILY MEDICINE CLINIC | Facility: CLINIC | Age: 71
End: 2021-10-16

## 2021-10-16 DIAGNOSIS — Z23 NEED FOR INFLUENZA VACCINATION: Primary | ICD-10-CM

## 2021-10-16 PROCEDURE — G0008 ADMIN INFLUENZA VIRUS VAC: HCPCS | Performed by: GENERAL PRACTICE

## 2021-10-16 PROCEDURE — 90662 IIV NO PRSV INCREASED AG IM: CPT | Performed by: GENERAL PRACTICE

## 2021-10-18 ENCOUNTER — OFFICE VISIT (OUTPATIENT)
Dept: CARDIOLOGY | Facility: CLINIC | Age: 71
End: 2021-10-18

## 2021-10-18 VITALS
TEMPERATURE: 96.4 F | HEART RATE: 56 BPM | OXYGEN SATURATION: 98 % | WEIGHT: 280 LBS | HEIGHT: 75 IN | DIASTOLIC BLOOD PRESSURE: 72 MMHG | SYSTOLIC BLOOD PRESSURE: 136 MMHG | BODY MASS INDEX: 34.82 KG/M2

## 2021-10-18 DIAGNOSIS — I25.10 CORONARY ARTERY DISEASE INVOLVING NATIVE CORONARY ARTERY OF NATIVE HEART WITHOUT ANGINA PECTORIS: Primary | ICD-10-CM

## 2021-10-18 PROCEDURE — 99214 OFFICE O/P EST MOD 30 MIN: CPT | Performed by: INTERNAL MEDICINE

## 2021-10-18 PROCEDURE — 93000 ELECTROCARDIOGRAM COMPLETE: CPT | Performed by: INTERNAL MEDICINE

## 2021-10-18 NOTE — PROGRESS NOTES
[facility]  OFFICE NOTE    Cardiovascular Medicine  Manuel Alcaraz M.D., RPVI         No referring provider defined for this encounter.    Thank you for asking me to see Robinson Quiroz for CAD.    History of Present Illness  This is a 71 y.o. male with:    1. Coronary artery disease involving native coronary artery of native heart without angina pectoris    2. Pure hypercholesterolemia    3. Essential hypertension          Chief complaint -Follow-up CAD        History of present Illness-71 y.o. year old gentleman with history of coronary disease status post quadruple bypass in 2004,   has coronary angiogram from 2012 which showed a patent LIMA to LAD, SVG to diagonal and is filling the LAD as well, SVG to circumflex looks to be occluded SVG to RPDA was patent.  He had significant disease in his native circumflex so underwent PCI of the mid circumflex and into and OM. had a abdominal aortic aneurysm screening last year and was okay.  He had a nuclear stress test in January 2017 which showed no ischemia EF was normal.  is on optimal medical therapy with beta blockers and angiotensin receptor blockers and his blood pressure is well controlled.  His BMI is high talked to him about diet and exercise.  Denies any GI symptoms will CNS symptoms.   He had one episode of chest pain several weeks ago, which is sharp in character lasted for 1 minute, was nonexertional and resolved on its own.  Is not recurred.  He is able to exercise on a treadmill for 30 minutes without any chest pain or shortness of breath.    10/16/2020:  No acute issues since last visit but denying any chest pain or shortness of breath.        Review of Systems - ROS  Constitution: Negative for weakness, weight gain and weight loss.   HENT: Negative for congestion.    Eyes: Negative for blurred vision.   Cardiovascular: As mentioned above  Respiratory: Negative for cough and hemoptysis.    Endocrine: Negative for polydipsia  and polyuria.   Hematologic/Lymphatic: Negative for bleeding problem. Does not bruise/bleed easily.   Skin: Negative for flushing.   Musculoskeletal: Negative for neck pain and stiffness.   Gastrointestinal: Negative for abdominal pain, diarrhea, jaundice, melena, nausea and vomiting.   Genitourinary: Negative for dysuria and hematuria.   Neurological: Negative for dizziness, focal weakness and numbness.   Psychiatric/Behavioral: Negative for altered mental status and depression.          All other systems were reviewed and were negative.    family history includes Alcohol abuse in his paternal uncle; Arthritis in his maternal aunt, maternal grandfather, maternal grandmother, and maternal uncle; Cancer in his mother; Diabetes in his mother; Early death in his mother; Hearing loss in his daughter and mother; Heart disease in his father; Hyperlipidemia in his mother; Liver disease in his mother; Pancreatic cancer in his mother; Stroke in his father.     reports that he quit smoking about 18 years ago. His smoking use included cigarettes. He has never used smokeless tobacco. He reports that he does not drink alcohol and does not use drugs.    Allergies   Allergen Reactions   • Penicillins Anaphylaxis   • Latex Rash         Current Outpatient Medications:   •  aspirin 81 MG tablet, Take 1 tablet by mouth Daily., Disp: 30 tablet, Rfl: 11  •  atorvastatin (LIPITOR) 80 MG tablet, Take 1 tablet by mouth Daily., Disp: 90 tablet, Rfl: 1  •  b complex-C-folic acid 1 MG capsule, Take 1 capsule by mouth Daily., Disp: , Rfl:   •  losartan-hydrochlorothiazide (HYZAAR) 50-12.5 MG per tablet, Take 1 tablet by mouth Daily., Disp: 90 tablet, Rfl: 2  •  metoprolol succinate XL (TOPROL-XL) 50 MG 24 hr tablet, Take 1 tablet by mouth Daily., Disp: 90 tablet, Rfl: 1  •  Multiple Vitamins-Minerals (MULTIVITAMIN ADULTS 50+ PO), Take 1 tablet by mouth., Disp: , Rfl:   •  Omega-3 Fatty Acids (FISH OIL) 1000 MG capsule capsule, Take 1,200 mg by  "mouth Daily With Breakfast., Disp: , Rfl:   •  pregabalin (LYRICA) 50 MG capsule, Take 1 capsule by mouth 2 (Two) Times a Day., Disp: 180 capsule, Rfl: 1  •  tamsulosin (FLOMAX) 0.4 MG capsule 24 hr capsule, Take 1 capsule by mouth Daily., Disp: 90 capsule, Rfl: 2    Physical Exam:  Vitals:    10/18/21 1351   BP: 136/72   Pulse: 56   Temp: 96.4 °F (35.8 °C)   SpO2: 98%   Weight: 127 kg (280 lb)   Height: 190.5 cm (75\")   PainSc: 0-No pain     Current Pain Level: none  Pulse Ox: Normal  on room air  General: alert, appears stated age and cooperative     Body Habitus: well-nourished    HEENT: Head: Normocephalic, no lesions, without obvious abnormality. No arcus senilis, xanthelasma or xanthomas.    Neuro: alert, oriented x3  Pulses: 2+ and symmetric  JVP: Volume/Pulsation: Normal.  Normal waveforms.   Appropriate inspiratory decrease.  No Kussmaul's. No Geno's.   Carotid Exam: no bruit normal pulsation bilaterally   Carotid Volume: normal.     Respirations: no increased work of breathing   Chest:  Normal    Pulmonary:Normal   Precordium: Normal impulses. P2 is not palpable.  RV Heave: absent  LV Heave: absent  Gail:  normal size and placement  Palpable S4: absent.  Heart rate: normal    Heart Rhythm: regular     Heart Sounds: S1: normal  S2: normal  S3: absent   S4: absent  Opening Snap: absent    Pericardial Rub:  Absent: .    Abdomen:   Appearance: normal .  Palpation: Soft, non-tender to palpation, bowel sounds positive in all four quadrants; no guarding or rebound tenderness  Extremity: no edema.   LE Skin: no rashes  LE Hair:  normal  LE Pulses: well perfused with normal pulses in the distal extremities  Pallor on elevation: Absent. Rubor on dependency: None      DATA REVIEWED:     EKG. I personally reviewed and interpreted the EKG.  Sinus bradycardia first-degree AV block.     ECG/EMG Results (all)     None        ---------------------------------------------------  TTE/ASHLYN:  Results for orders placed in " visit on 10/25/19    Adult Transthoracic Echo Complete W/ Cont if Necessary Per Protocol    Interpretation Summary  · Left ventricular wall thickness is consistent with borderline concentric hypertrophy.  · Left ventricular diastolic dysfunction (grade I) consistent with impaired relaxation.  · Left ventricular systolic function is normal.  · Estimated EF appears to be in the range of 56 - 60%.      --------------------------------------------------------------------------------------------------  LABS:     The CVD Risk score (Ela et al., 2008) failed to calculate for the following reasons:    The patient has a prior MI, stroke, CHF, or peripheral vascular disease diagnosis         Lab Results   Component Value Date    GLUCOSE 100 (H) 05/27/2021    BUN 10 05/27/2021    CREATININE 0.81 05/27/2021    EGFRIFNONA 94 05/27/2021    BCR 12.3 05/27/2021    K 4.1 05/27/2021    CO2 25.0 05/27/2021    CALCIUM 9.7 05/27/2021    ALBUMIN 4.40 05/27/2021    AST 60 (H) 05/27/2021    ALT 79 (H) 05/27/2021     Lab Results   Component Value Date    WBC 5.65 05/27/2021    HGB 15.5 05/27/2021    HCT 44.8 05/27/2021    MCV 89.8 05/27/2021     05/27/2021     Lab Results   Component Value Date    CHOL 107 05/27/2021    CHLPL 123 01/29/2016    TRIG 142 05/27/2021    HDL 46 05/27/2021    LDL 37 05/27/2021     Lab Results   Component Value Date    TSH 2.890 08/02/2018     Lab Results   Component Value Date    CKTOTAL 190 (H) 08/02/2018     Lab Results   Component Value Date    HGBA1C 5.6 01/29/2016     No results found for: DDIMER  Lab Results   Component Value Date    ALT 79 (H) 05/27/2021     Lab Results   Component Value Date    HGBA1C 5.6 01/29/2016     Lab Results   Component Value Date    CREATININE 0.81 05/27/2021     Lab Results   Component Value Date    IRON 79 03/13/2014    TIBC 320 03/13/2014     No results found for: INR, PROTIME     Interpretation Summary    · Left ventricular wall thickness is consistent with  borderline concentric hypertrophy.  · Left ventricular diastolic dysfunction (grade I) consistent with impaired relaxation.  · Left ventricular systolic function is normal.  · Estimated EF appears to be in the range of 56 - 60%.         Assessment/Plan   CAD status post CABG-history of stent placement to circumflex proximal artery in 2013 asked to take a regular aspirin and he had a stress test in January 2017 which was unremarkable we'll continue medical therapy.  Echo was with normal LV systolic function     Hypertension continue the Toprol in the evening and Hyzaar in the morning.     Hyperlipidemia on atorvastatin doing okay.  And he was taken a regular aspirin every day.  LDL was less than 50 in 09/2020         Prevention:  Patient's Body mass index is 35 kg/m². BMI is above normal parameters. Recommendations include: exercise counseling and nutrition counseling.      Robinson Quiroz is not a smoker    AAA Screening:      IMPRESSION: 05/2017  CONCLUSION:  No abdominal aortic aneurysm.          This document has been electronically signed by Manuel Alcaraz MD on October 18, 2021 14:03 CDT

## 2021-10-19 ENCOUNTER — OFFICE VISIT (OUTPATIENT)
Dept: FAMILY MEDICINE CLINIC | Facility: CLINIC | Age: 71
End: 2021-10-19

## 2021-10-19 VITALS
BODY MASS INDEX: 34.49 KG/M2 | DIASTOLIC BLOOD PRESSURE: 74 MMHG | HEIGHT: 75 IN | SYSTOLIC BLOOD PRESSURE: 138 MMHG | OXYGEN SATURATION: 97 % | HEART RATE: 71 BPM | WEIGHT: 277.38 LBS

## 2021-10-19 DIAGNOSIS — G89.29 CHRONIC MIDLINE LOW BACK PAIN, UNSPECIFIED WHETHER SCIATICA PRESENT: Primary | ICD-10-CM

## 2021-10-19 DIAGNOSIS — M54.50 CHRONIC MIDLINE LOW BACK PAIN, UNSPECIFIED WHETHER SCIATICA PRESENT: Primary | ICD-10-CM

## 2021-10-19 DIAGNOSIS — R10.9 ABDOMINAL PAIN, UNSPECIFIED ABDOMINAL LOCATION: ICD-10-CM

## 2021-10-19 PROCEDURE — 99213 OFFICE O/P EST LOW 20 MIN: CPT | Performed by: FAMILY MEDICINE

## 2021-10-19 NOTE — PROGRESS NOTES
Subjective   Robinson Quiroz is a 71 y.o. male.   Cc: follow up of chronic medical issues    Back Pain  This is a chronic problem. The current episode started more than 1 year ago. The problem occurs daily. The pain is present in the lumbar spine. The quality of the pain is described as aching. Pertinent negatives include no abdominal pain, bladder incontinence, bowel incontinence, dysuria, fever, headaches, numbness, tingling or weakness.   His abdominal pain that was present has gotten better.It has been at it's base line.    The following portions of the patient's history were reviewed and updated as appropriate: allergies, current medications, past family history, past medical history, past social history, past surgical history and problem list.    Review of Systems   Constitutional: Negative for fever.   Gastrointestinal: Negative for abdominal pain and bowel incontinence.   Genitourinary: Negative for bladder incontinence and dysuria.   Musculoskeletal: Positive for back pain.   Neurological: Negative for tingling, weakness, numbness and headaches.       Objective   Physical Exam  Vitals reviewed.   Constitutional:       Appearance: Normal appearance.   HENT:      Head: Normocephalic and atraumatic.      Right Ear: Tympanic membrane, ear canal and external ear normal.      Left Ear: Tympanic membrane, ear canal and external ear normal.      Nose: Nose normal.      Mouth/Throat:      Mouth: Mucous membranes are moist.      Pharynx: Oropharynx is clear.   Cardiovascular:      Rate and Rhythm: Normal rate and regular rhythm.      Heart sounds: Normal heart sounds. No murmur heard.  No friction rub. No gallop.    Pulmonary:      Effort: Pulmonary effort is normal. No respiratory distress.      Breath sounds: Normal breath sounds. No stridor. No wheezing, rhonchi or rales.   Chest:      Chest wall: No tenderness.   Abdominal:      General: Abdomen is flat. Bowel sounds are normal. There is no distension.       "Palpations: Abdomen is soft. There is no mass.      Tenderness: There is no abdominal tenderness. There is no guarding or rebound.      Hernia: No hernia is present.   Musculoskeletal:      Cervical back: Normal range of motion.      Comments: Minimal back pain on palpation.   Neurological:      Mental Status: He is alert.           Visit Vitals  /74   Pulse 71   Ht 190.5 cm (75\")   Wt 126 kg (277 lb 6 oz)   SpO2 97%   BMI 34.67 kg/m²     Body mass index is 34.67 kg/m².      Assessment/Plan   Diagnoses and all orders for this visit:    1. Chronic midline low back pain, unspecified whether sciatica present (Primary)    2. Abdominal pain, unspecified abdominal location    .I reviewed the Lumbar and abdominal film with the patient..  Keep next appointment.  "

## 2021-10-21 LAB
QT INTERVAL: 434 MS
QTC INTERVAL: 418 MS

## 2021-11-20 DIAGNOSIS — G62.9 PERIPHERAL POLYNEUROPATHY: ICD-10-CM

## 2021-11-22 RX ORDER — PREGABALIN 50 MG/1
50 CAPSULE ORAL 2 TIMES DAILY
Qty: 180 CAPSULE | Refills: 1 | Status: SHIPPED | OUTPATIENT
Start: 2021-11-22 | End: 2021-12-07 | Stop reason: SDUPTHER

## 2021-12-07 ENCOUNTER — OFFICE VISIT (OUTPATIENT)
Dept: FAMILY MEDICINE CLINIC | Facility: CLINIC | Age: 71
End: 2021-12-07

## 2021-12-07 VITALS
WEIGHT: 277.44 LBS | SYSTOLIC BLOOD PRESSURE: 134 MMHG | OXYGEN SATURATION: 96 % | HEART RATE: 73 BPM | HEIGHT: 75 IN | BODY MASS INDEX: 34.49 KG/M2 | DIASTOLIC BLOOD PRESSURE: 76 MMHG

## 2021-12-07 DIAGNOSIS — I10 ESSENTIAL HYPERTENSION: ICD-10-CM

## 2021-12-07 DIAGNOSIS — Z00.00 MEDICARE ANNUAL WELLNESS VISIT, SUBSEQUENT: Primary | ICD-10-CM

## 2021-12-07 DIAGNOSIS — G62.9 PERIPHERAL POLYNEUROPATHY: ICD-10-CM

## 2021-12-07 PROCEDURE — 1170F FXNL STATUS ASSESSED: CPT | Performed by: FAMILY MEDICINE

## 2021-12-07 PROCEDURE — G0439 PPPS, SUBSEQ VISIT: HCPCS | Performed by: FAMILY MEDICINE

## 2021-12-07 PROCEDURE — 1159F MED LIST DOCD IN RCRD: CPT | Performed by: FAMILY MEDICINE

## 2021-12-07 PROCEDURE — 99214 OFFICE O/P EST MOD 30 MIN: CPT | Performed by: FAMILY MEDICINE

## 2021-12-07 RX ORDER — PREGABALIN 50 MG/1
50 CAPSULE ORAL 2 TIMES DAILY
Qty: 180 CAPSULE | Refills: 1 | Status: SHIPPED | OUTPATIENT
Start: 2021-12-07 | End: 2022-05-23 | Stop reason: SDUPTHER

## 2021-12-07 RX ORDER — LOSARTAN POTASSIUM AND HYDROCHLOROTHIAZIDE 12.5; 5 MG/1; MG/1
1 TABLET ORAL DAILY
Qty: 90 TABLET | Refills: 2 | Status: SHIPPED | OUTPATIENT
Start: 2021-12-07 | End: 2022-09-12 | Stop reason: SDUPTHER

## 2021-12-07 NOTE — PROGRESS NOTES
The ABCs of the Annual Wellness Visit  Subsequent Medicare Wellness Visit  Cc: follow up of chronic medical issues    Chief Complaint   Patient presents with   • Medicare Wellness-subsequent    The patient comes in for his Medicare Wellness Exam.His Hypertension and his Neuropathy are responding to his medication.  Subjective    History of Present Illness:  Robinson Quiroz is a 71 y.o. male who presents for a Subsequent Medicare Wellness Visit.    The following portions of the patient's history were reviewed and   updated as appropriate: allergies, current medications, past family history, past medical history, past social history, past surgical history and problem list.    Compared to one year ago, the patient feels his physical   health is the same.    Compared to one year ago, the patient feels his mental   health is the same.    Recent Hospitalizations:  He was not admitted to the hospital during the last year.       Current Medical Providers:  Patient Care Team:  Merrick Gorman MD as PCP - General (Family Medicine)    Outpatient Medications Prior to Visit   Medication Sig Dispense Refill   • aspirin 81 MG tablet Take 1 tablet by mouth Daily. 30 tablet 11   • atorvastatin (LIPITOR) 80 MG tablet Take 1 tablet by mouth Daily. 90 tablet 1   • b complex-C-folic acid 1 MG capsule Take 1 capsule by mouth Daily.     • metoprolol succinate XL (TOPROL-XL) 50 MG 24 hr tablet Take 1 tablet by mouth Daily. 90 tablet 1   • Multiple Vitamins-Minerals (MULTIVITAMIN ADULTS 50+ PO) Take 1 tablet by mouth.     • Omega-3 Fatty Acids (FISH OIL) 1000 MG capsule capsule Take 1,200 mg by mouth Daily With Breakfast.     • tamsulosin (FLOMAX) 0.4 MG capsule 24 hr capsule Take 1 capsule by mouth Daily. 90 capsule 2   • losartan-hydrochlorothiazide (HYZAAR) 50-12.5 MG per tablet Take 1 tablet by mouth Daily. 90 tablet 2   • pregabalin (LYRICA) 50 MG capsule Take 1 capsule by mouth 2 (Two) Times a Day. 180 capsule 1     No  "facility-administered medications prior to visit.       No opioid medication identified on active medication list. I have reviewed chart for other potential  high risk medication/s and harmful drug interactions in the elderly.          Aspirin is on active medication list. Aspirin use is indicated based on review of current medical condition/s. Pros and cons of this therapy have been discussed today. Benefits of this medication outweigh potential harm.  Patient has been encouraged to continue taking this medication.  .      Patient Active Problem List   Diagnosis   • Essential hypertension   • Coronary artery disease involving native coronary artery without angina pectoris   • Chronic right shoulder pain   • Rotator cuff strain   • Coronary artery disease involving native coronary artery of native heart without angina pectoris   • Chest pain   • Hyperlipidemia   • Peripheral polyneuropathy     Advance Care Planning  Advance Directive is not on file.  ACP discussion was held with the patient during this visit. Patient does not have an advance directive, information provided.    Review of Systems   Constitutional: Negative for fatigue and fever.   Respiratory: Negative for cough, chest tightness and shortness of breath.    Cardiovascular: Negative for chest pain and leg swelling.   Musculoskeletal: Negative for arthralgias, back pain and gait problem.        Objective    Vitals:    12/07/21 0853   BP: 134/76   Pulse: 73   SpO2: 96%   Weight: 126 kg (277 lb 7 oz)   Height: 190.5 cm (75\")     BMI Readings from Last 1 Encounters:   12/07/21 34.68 kg/m²   BMI is above normal parameters. Recommendations include: Continue on with current dietary provisions    Does the patient have evidence of cognitive impairment? No    Physical Exam  Vitals reviewed.   Constitutional:       Appearance: Normal appearance.   HENT:      Head: Normocephalic and atraumatic.      Right Ear: Tympanic membrane, ear canal and external ear normal. "      Left Ear: Tympanic membrane, ear canal and external ear normal.      Nose: Nose normal.      Mouth/Throat:      Mouth: Mucous membranes are moist.      Pharynx: Oropharynx is clear.   Cardiovascular:      Rate and Rhythm: Normal rate and regular rhythm.      Heart sounds: Normal heart sounds. No murmur heard.  No friction rub. No gallop.    Pulmonary:      Effort: Pulmonary effort is normal. No respiratory distress.      Breath sounds: Normal breath sounds. No stridor. No wheezing, rhonchi or rales.   Chest:      Chest wall: No tenderness.   Abdominal:      General: Abdomen is flat. Bowel sounds are normal. There is no distension.      Palpations: Abdomen is soft. There is no mass.      Tenderness: There is no abdominal tenderness. There is no guarding.      Hernia: No hernia is present.   Musculoskeletal:      Cervical back: Normal range of motion.   Skin:     General: Skin is warm and dry.   Neurological:      Mental Status: He is alert.                 HEALTH RISK ASSESSMENT    Smoking Status:  Social History     Tobacco Use   Smoking Status Former Smoker   • Types: Cigarettes   • Quit date: 2003   • Years since quittin.3   Smokeless Tobacco Never Used   Tobacco Comment    smoked 1pd or more for less than 20 years     Alcohol Consumption:  Social History     Substance and Sexual Activity   Alcohol Use No     Fall Risk Screen:    [facility] Fall Risk Assessment was completed, and patient is at LOW risk for falls.Assessment completed on:2021    Depression Screening:  PHQ-2/PHQ-9 Depression Screening 2021   Little interest or pleasure in doing things 0   Feeling down, depressed, or hopeless 0   Total Score 0       Health Habits and Functional and Cognitive Screening:  Functional & Cognitive Status 2021   Do you have difficulty preparing food and eating? No   Do you have difficulty bathing yourself, getting dressed or grooming yourself? No   Do you have difficulty using the toilet? No   Do  you have difficulty moving around from place to place? No   Do you have trouble with steps or getting out of a bed or a chair? Yes   Current Diet Well Balanced Diet   Dental Exam Up to date        Dental Exam Comment -   Eye Exam Not up to date   Exercise (times per week) 0 times per week   Current Exercises Include No Regular Exercise   Current Exercise Activities Include -   Do you need help using the phone?  No   Are you deaf or do you have serious difficulty hearing?  Yes   Do you need help with transportation? No   Do you need help shopping? No   Do you need help preparing meals?  No   Do you need help with housework?  No   Do you need help with laundry? No   Do you need help taking your medications? No   Do you need help managing money? No   Do you ever drive or ride in a car without wearing a seat belt? No   Have you felt unusual stress, anger or loneliness in the last month? No   Who do you live with? Other   If you need help, do you have trouble finding someone available to you? No   Have you been bothered in the last four weeks by sexual problems? No   Do you have difficulty concentrating, remembering or making decisions? No       Age-appropriate Screening Schedule:  Refer to the list below for future screening recommendations based on patient's age, sex and/or medical conditions. Orders for these recommended tests are listed in the plan section. The patient has been provided with a written plan.    Health Maintenance   Topic Date Due   • ZOSTER VACCINE (1 of 2) Never done   • LIPID PANEL  05/27/2022   • TDAP/TD VACCINES (2 - Td or Tdap) 10/17/2026   • INFLUENZA VACCINE  Completed              Assessment/Plan   CMS Preventative Services Quick Reference  Risk Factors Identified During Encounter  Dementia/Memory   Depression/Dysphoria  Fall Risk-High or Moderate  Hearing Problem  The above risks/problems have been discussed with the patient.  Follow up actions/plans if indicated are seen below in the  Assessment/Plan Section.  Pertinent information has been shared with the patient in the After Visit Summary.    Diagnoses and all orders for this visit:    1. Medicare annual wellness visit, subsequent (Primary)    2. Peripheral polyneuropathy  -     pregabalin (LYRICA) 50 MG capsule; Take 1 capsule by mouth 2 (Two) Times a Day.  Dispense: 180 capsule; Refill: 1    3. Essential hypertension  -     losartan-hydrochlorothiazide (HYZAAR) 50-12.5 MG per tablet; Take 1 tablet by mouth Daily.  Dispense: 90 tablet; Refill: 2    I reviewed the CBC,CMP,and Lipid Profile with the patient.    Return to the clinic in 3 month/s.  Will contact with results as needed.    Follow Up:   Return in about 3 months (around 3/7/2022) for Recheck.     An After Visit Summary and PPPS were made available to the patient.

## 2022-03-08 ENCOUNTER — OFFICE VISIT (OUTPATIENT)
Dept: FAMILY MEDICINE CLINIC | Facility: CLINIC | Age: 72
End: 2022-03-08

## 2022-03-08 VITALS
SYSTOLIC BLOOD PRESSURE: 136 MMHG | OXYGEN SATURATION: 98 % | HEIGHT: 75 IN | WEIGHT: 275 LBS | HEART RATE: 71 BPM | DIASTOLIC BLOOD PRESSURE: 80 MMHG | BODY MASS INDEX: 34.19 KG/M2

## 2022-03-08 DIAGNOSIS — I10 ESSENTIAL HYPERTENSION: Primary | ICD-10-CM

## 2022-03-08 DIAGNOSIS — E78.00 PURE HYPERCHOLESTEROLEMIA: ICD-10-CM

## 2022-03-08 DIAGNOSIS — G62.9 PERIPHERAL POLYNEUROPATHY: ICD-10-CM

## 2022-03-08 PROCEDURE — 99214 OFFICE O/P EST MOD 30 MIN: CPT | Performed by: FAMILY MEDICINE

## 2022-03-08 RX ORDER — PREGABALIN 50 MG/1
50 CAPSULE ORAL 2 TIMES DAILY
Qty: 180 CAPSULE | Refills: 1 | Status: CANCELLED | OUTPATIENT
Start: 2022-03-08

## 2022-03-08 RX ORDER — METOPROLOL SUCCINATE 50 MG/1
50 TABLET, EXTENDED RELEASE ORAL DAILY
Qty: 90 TABLET | Refills: 1 | Status: CANCELLED | OUTPATIENT
Start: 2022-03-08

## 2022-03-08 RX ORDER — ATORVASTATIN CALCIUM 80 MG/1
80 TABLET, FILM COATED ORAL DAILY
Qty: 90 TABLET | Refills: 1 | Status: CANCELLED | OUTPATIENT
Start: 2022-03-08

## 2022-03-08 NOTE — PROGRESS NOTES
Subjective   Robinson Quiroz is a 72 y.o. male.   Cc: follow up of chronic medical issues    Hypertension  This is a chronic problem. The current episode started more than 1 year ago. The problem has been gradually improving since onset. Pertinent negatives include no anxiety, blurred vision, chest pain, headaches, malaise/fatigue, neck pain, orthopnea, palpitations, peripheral edema, PND, shortness of breath or sweats. Current antihypertension treatment includes beta blockers.   Hyperlipidemia  This is a chronic problem. The current episode started more than 1 year ago. The problem is resistant. Pertinent negatives include no chest pain or shortness of breath.   Polyneuropathy is present and his symptoms are stable.    The following portions of the patient's history were reviewed and updated as appropriate: allergies, current medications, past family history, past medical history, past social history, past surgical history and problem list.    Review of Systems   Constitutional: Negative for fatigue, fever and malaise/fatigue.   Eyes: Negative for blurred vision.   Respiratory: Negative for cough, shortness of breath and wheezing.    Cardiovascular: Negative for chest pain, palpitations, orthopnea, leg swelling and PND.   Gastrointestinal: Negative for abdominal pain, blood in stool, constipation and diarrhea.   Musculoskeletal: Negative for neck pain.   Neurological: Negative for headaches.       Objective   Physical Exam  Vitals reviewed.   Constitutional:       Appearance: Normal appearance.   HENT:      Head: Normocephalic and atraumatic.      Right Ear: Tympanic membrane, ear canal and external ear normal.      Left Ear: Tympanic membrane, ear canal and external ear normal.      Nose: Nose normal.      Mouth/Throat:      Mouth: Mucous membranes are moist.      Pharynx: Oropharynx is clear.   Cardiovascular:      Rate and Rhythm: Normal rate and regular rhythm.      Heart sounds: Normal heart sounds. No  "murmur heard.    No friction rub. No gallop.   Pulmonary:      Effort: Pulmonary effort is normal. No respiratory distress.      Breath sounds: Normal breath sounds. No stridor. No wheezing, rhonchi or rales.   Chest:      Chest wall: No tenderness.   Abdominal:      General: Abdomen is flat. Bowel sounds are normal. There is no distension.      Palpations: Abdomen is soft. There is no mass.      Tenderness: There is no abdominal tenderness. There is no guarding or rebound.      Hernia: No hernia is present.   Musculoskeletal:      Cervical back: Normal range of motion.   Skin:     General: Skin is warm and dry.   Neurological:      Mental Status: He is alert.           Visit Vitals  /80   Pulse 71   Ht 190.5 cm (75\")   Wt 125 kg (275 lb)   SpO2 98%   BMI 34.37 kg/m²     Body mass index is 34.37 kg/m².      Assessment/Plan   Diagnoses and all orders for this visit:    1. Essential hypertension (Primary)  -     Comprehensive metabolic panel; Future  -     CBC w AUTO Differential; Future    2. Pure hypercholesterolemia  -     Lipid panel; Future    3. Peripheral polyneuropathy    Return to the clinic in 3 month/s.  Will contact with results as needed.  I reviewed the CBC,CMP,and Lipid Profile with the patient.    "

## 2022-03-10 ENCOUNTER — TELEPHONE (OUTPATIENT)
Dept: FAMILY MEDICINE CLINIC | Facility: CLINIC | Age: 72
End: 2022-03-10

## 2022-03-10 DIAGNOSIS — R45.4 DIFFICULTY CONTROLLING ANGER: Primary | ICD-10-CM

## 2022-03-11 ENCOUNTER — TELEPHONE (OUTPATIENT)
Dept: FAMILY MEDICINE CLINIC | Facility: CLINIC | Age: 72
End: 2022-03-11

## 2022-03-11 NOTE — TELEPHONE ENCOUNTER
Sandrajabier called and is wanting a call back from Dr Gorman. He states he has been waiting all morning for a call back. Please call on his cell @ [phone]

## 2022-03-22 ENCOUNTER — TELEPHONE (OUTPATIENT)
Dept: FAMILY MEDICINE CLINIC | Facility: CLINIC | Age: 72
End: 2022-03-22

## 2022-03-22 NOTE — TELEPHONE ENCOUNTER
Patient called stating that he called on 3/11 wanting Dr. Gorman to call him. He stated he has not received a call back. Patient would not leave any details. Please call @ [phone]

## 2022-03-28 ENCOUNTER — TELEPHONE (OUTPATIENT)
Dept: FAMILY MEDICINE CLINIC | Facility: CLINIC | Age: 72
End: 2022-03-28

## 2022-03-28 NOTE — TELEPHONE ENCOUNTER
Pt was seen in Dec for medicare wellness and then was billed over $219 for a second visit on the same day and was only seen for the medicare wellness and medicare will only pay for the wellness visit .    Talked to billing and billing told her that it was a copay but they don't have a copay so can we see what went wrong and why we would have sent 2 bills for same visit call [phone]

## 2022-04-18 RX ORDER — ATORVASTATIN CALCIUM 80 MG/1
TABLET, FILM COATED ORAL
Qty: 90 TABLET | Refills: 1 | Status: SHIPPED | OUTPATIENT
Start: 2022-04-18 | End: 2022-08-22

## 2022-04-18 RX ORDER — METOPROLOL SUCCINATE 50 MG/1
TABLET, EXTENDED RELEASE ORAL
Qty: 90 TABLET | Refills: 1 | Status: SHIPPED | OUTPATIENT
Start: 2022-04-18 | End: 2022-09-12

## 2022-05-03 ENCOUNTER — OFFICE VISIT (OUTPATIENT)
Dept: FAMILY MEDICINE CLINIC | Facility: CLINIC | Age: 72
End: 2022-05-03

## 2022-05-03 ENCOUNTER — LAB (OUTPATIENT)
Dept: LAB | Facility: HOSPITAL | Age: 72
End: 2022-05-03

## 2022-05-03 VITALS
TEMPERATURE: 98.2 F | HEART RATE: 68 BPM | HEIGHT: 75 IN | WEIGHT: 274.5 LBS | OXYGEN SATURATION: 97 % | DIASTOLIC BLOOD PRESSURE: 76 MMHG | SYSTOLIC BLOOD PRESSURE: 134 MMHG | BODY MASS INDEX: 34.13 KG/M2 | RESPIRATION RATE: 24 BRPM

## 2022-05-03 DIAGNOSIS — M54.50 CHRONIC LOW BACK PAIN, UNSPECIFIED BACK PAIN LATERALITY, UNSPECIFIED WHETHER SCIATICA PRESENT: ICD-10-CM

## 2022-05-03 DIAGNOSIS — R53.83 FATIGUE, UNSPECIFIED TYPE: ICD-10-CM

## 2022-05-03 DIAGNOSIS — R10.10 PAIN OF UPPER ABDOMEN: Primary | ICD-10-CM

## 2022-05-03 DIAGNOSIS — G89.29 CHRONIC LOW BACK PAIN, UNSPECIFIED BACK PAIN LATERALITY, UNSPECIFIED WHETHER SCIATICA PRESENT: ICD-10-CM

## 2022-05-03 LAB
ALBUMIN SERPL-MCNC: 4.3 G/DL (ref 3.5–5.2)
ALBUMIN/GLOB SERPL: 1.5 G/DL
ALP SERPL-CCNC: 75 U/L (ref 39–117)
ALT SERPL W P-5'-P-CCNC: 57 U/L (ref 1–41)
AMYLASE SERPL-CCNC: 308 U/L (ref 28–100)
ANION GAP SERPL CALCULATED.3IONS-SCNC: 10 MMOL/L (ref 5–15)
AST SERPL-CCNC: 43 U/L (ref 1–40)
BASOPHILS # BLD AUTO: 0.05 10*3/MM3 (ref 0–0.2)
BASOPHILS NFR BLD AUTO: 0.7 % (ref 0–1.5)
BILIRUB SERPL-MCNC: 0.9 MG/DL (ref 0–1.2)
BUN SERPL-MCNC: 12 MG/DL (ref 8–23)
BUN/CREAT SERPL: 14.6 (ref 7–25)
CALCIUM SPEC-SCNC: 9.3 MG/DL (ref 8.6–10.5)
CHLORIDE SERPL-SCNC: 101 MMOL/L (ref 98–107)
CO2 SERPL-SCNC: 25 MMOL/L (ref 22–29)
CREAT SERPL-MCNC: 0.82 MG/DL (ref 0.76–1.27)
DEPRECATED RDW RBC AUTO: 39.5 FL (ref 37–54)
EGFRCR SERPLBLD CKD-EPI 2021: 93.9 ML/MIN/1.73
EOSINOPHIL # BLD AUTO: 0.18 10*3/MM3 (ref 0–0.4)
EOSINOPHIL NFR BLD AUTO: 2.6 % (ref 0.3–6.2)
ERYTHROCYTE [DISTWIDTH] IN BLOOD BY AUTOMATED COUNT: 12.8 % (ref 12.3–15.4)
EXPIRATION DATE: NORMAL
GLOBULIN UR ELPH-MCNC: 2.8 GM/DL
GLUCOSE SERPL-MCNC: 117 MG/DL (ref 65–99)
HCT VFR BLD AUTO: 42.6 % (ref 37.5–51)
HGB BLD-MCNC: 15 G/DL (ref 13–17.7)
IMM GRANULOCYTES # BLD AUTO: 0.1 10*3/MM3 (ref 0–0.05)
IMM GRANULOCYTES NFR BLD AUTO: 1.4 % (ref 0–0.5)
INTERNAL CONTROL: NORMAL
LIPASE SERPL-CCNC: 26 U/L (ref 13–60)
LYMPHOCYTES # BLD AUTO: 1.94 10*3/MM3 (ref 0.7–3.1)
LYMPHOCYTES NFR BLD AUTO: 27.8 % (ref 19.6–45.3)
Lab: NORMAL
MCH RBC QN AUTO: 30.4 PG (ref 26.6–33)
MCHC RBC AUTO-ENTMCNC: 35.2 G/DL (ref 31.5–35.7)
MCV RBC AUTO: 86.2 FL (ref 79–97)
MONOCYTES # BLD AUTO: 0.98 10*3/MM3 (ref 0.1–0.9)
MONOCYTES NFR BLD AUTO: 14 % (ref 5–12)
NEUTROPHILS NFR BLD AUTO: 3.74 10*3/MM3 (ref 1.7–7)
NEUTROPHILS NFR BLD AUTO: 53.5 % (ref 42.7–76)
NRBC BLD AUTO-RTO: 0 /100 WBC (ref 0–0.2)
PLATELET # BLD AUTO: 174 10*3/MM3 (ref 140–450)
PMV BLD AUTO: 9.6 FL (ref 6–12)
POTASSIUM SERPL-SCNC: 3.5 MMOL/L (ref 3.5–5.2)
PROT SERPL-MCNC: 7.1 G/DL (ref 6–8.5)
RBC # BLD AUTO: 4.94 10*6/MM3 (ref 4.14–5.8)
SARS-COV-2 AG UPPER RESP QL IA.RAPID: NOT DETECTED
SODIUM SERPL-SCNC: 136 MMOL/L (ref 136–145)
WBC NRBC COR # BLD: 6.99 10*3/MM3 (ref 3.4–10.8)

## 2022-05-03 PROCEDURE — 99213 OFFICE O/P EST LOW 20 MIN: CPT | Performed by: NURSE PRACTITIONER

## 2022-05-03 PROCEDURE — 83690 ASSAY OF LIPASE: CPT | Performed by: NURSE PRACTITIONER

## 2022-05-03 PROCEDURE — 85025 COMPLETE CBC W/AUTO DIFF WBC: CPT | Performed by: NURSE PRACTITIONER

## 2022-05-03 PROCEDURE — 82150 ASSAY OF AMYLASE: CPT | Performed by: NURSE PRACTITIONER

## 2022-05-03 PROCEDURE — 87426 SARSCOV CORONAVIRUS AG IA: CPT | Performed by: NURSE PRACTITIONER

## 2022-05-03 PROCEDURE — 80053 COMPREHEN METABOLIC PANEL: CPT | Performed by: NURSE PRACTITIONER

## 2022-05-03 PROCEDURE — 96372 THER/PROPH/DIAG INJ SC/IM: CPT | Performed by: NURSE PRACTITIONER

## 2022-05-03 PROCEDURE — 36415 COLL VENOUS BLD VENIPUNCTURE: CPT | Performed by: NURSE PRACTITIONER

## 2022-05-03 RX ORDER — TRIAMCINOLONE ACETONIDE 40 MG/ML
80 INJECTION, SUSPENSION INTRA-ARTICULAR; INTRAMUSCULAR ONCE
Status: COMPLETED | OUTPATIENT
Start: 2022-05-03 | End: 2022-05-03

## 2022-05-03 RX ADMIN — TRIAMCINOLONE ACETONIDE 80 MG: 40 INJECTION, SUSPENSION INTRA-ARTICULAR; INTRAMUSCULAR at 09:47

## 2022-05-03 NOTE — PROGRESS NOTES
Chief Complaint  Fatigue, Chills, and URI    Subjective          Robinson Quiroz presents to [facility] with complaints of chills that occured 2 nights ago, states he had to wake up and put warm clothes on, when he woke up he felt fatigued and had some upper abdominal pain. He had ate catfish the night it started, and had also been on a long trip. Patient states he is feeling 90% better today, but thought he would come in to be seen anyway since his lower back has been bothering him, and rule out covid.         Fatigue  This is a new problem. The current episode started in the past 7 days. The problem has been rapidly improving. Associated symptoms include chills and fatigue. Nothing aggravates the symptoms. He has tried rest and sleep for the symptoms. The treatment provided mild relief.   Chills  This is a new problem. The current episode started in the past 7 days. The problem has been rapidly improving. Associated symptoms include chills and fatigue. Nothing aggravates the symptoms. He has tried rest and sleep for the symptoms. The treatment provided significant relief.   Back Pain  This is a chronic problem. The current episode started more than 1 year ago. The problem occurs intermittently. The problem has been waxing and waning since onset. The pain is present in the lumbar spine. He has tried bed rest for the symptoms.     Outpatient Medications Prior to Visit   Medication Sig Dispense Refill   • aspirin 81 MG tablet Take 1 tablet by mouth Daily. 30 tablet 11   • atorvastatin (LIPITOR) 80 MG tablet TAKE 1 TABLET BY MOUTH EVERY DAY 90 tablet 1   • b complex-C-folic acid 1 MG capsule Take 1 capsule by mouth Daily.     • losartan-hydrochlorothiazide (HYZAAR) 50-12.5 MG per tablet Take 1 tablet by mouth Daily. 90 tablet 2   • metoprolol succinate XL (TOPROL-XL) 50 MG 24 hr tablet TAKE 1 TABLET BY MOUTH EVERY DAY 90 tablet 1   • Multiple Vitamins-Minerals  "(MULTIVITAMIN ADULTS 50+ PO) Take 1 tablet by mouth.     • Omega-3 Fatty Acids (FISH OIL) 1000 MG capsule capsule Take 1,200 mg by mouth Daily With Breakfast.     • pregabalin (LYRICA) 50 MG capsule Take 1 capsule by mouth 2 (Two) Times a Day. 180 capsule 1   • tamsulosin (FLOMAX) 0.4 MG capsule 24 hr capsule Take 1 capsule by mouth Daily. 90 capsule 2     No facility-administered medications prior to visit.       Review of Systems   Constitutional: Positive for chills and fatigue.   Musculoskeletal: Positive for back pain.         Objective   Vital Signs:   Visit Vitals  /76 (BP Location: Left arm, Patient Position: Sitting, Cuff Size: Large Adult)   Pulse 68   Temp 98.2 °F (36.8 °C) (Tympanic)   Resp 24   Ht 190.5 cm (75\")   Wt 125 kg (274 lb 8 oz)   SpO2 97%   BMI 34.31 kg/m²     Physical Exam  Vitals and nursing note reviewed.   Constitutional:       Appearance: Normal appearance. He is well-developed.   HENT:      Head: Normocephalic and atraumatic.      Right Ear: Hearing normal.      Left Ear: Hearing normal.      Nose: Nose normal. No mucosal edema or rhinorrhea.   Eyes:      General: Lids are normal.      Conjunctiva/sclera: Conjunctivae normal.      Pupils: Pupils are equal, round, and reactive to light.   Neck:      Thyroid: No thyroid mass or thyromegaly.      Trachea: Trachea normal. No tracheal tenderness or tracheal deviation.   Cardiovascular:      Rate and Rhythm: Normal rate.      Pulses: Normal pulses.      Heart sounds: Normal heart sounds.   Pulmonary:      Effort: Pulmonary effort is normal. No respiratory distress.      Breath sounds: Normal breath sounds. No stridor. No wheezing or rales.   Abdominal:      Palpations: Abdomen is soft.   Musculoskeletal:         General: Normal range of motion.      Cervical back: Normal range of motion.   Lymphadenopathy:      Head:      Right side of head: No submental, submandibular or tonsillar adenopathy.      Left side of head: No submental, " submandibular or tonsillar adenopathy.      Cervical: No cervical adenopathy.   Skin:     General: Skin is warm and dry.   Neurological:      Mental Status: He is alert and oriented to person, place, and time.   Psychiatric:         Mood and Affect: Mood is not anxious. Affect is not inappropriate.         Speech: Speech normal.         Behavior: Behavior normal. Behavior is not agitated or hyperactive.         Thought Content: Thought content normal.         Judgment: Judgment normal.        Result Review :                 Assessment and Plan    Diagnoses and all orders for this visit:    1. Pain of upper abdomen (Primary)  -     CBC & Differential  -     Comprehensive Metabolic Panel  -     Amylase  -     Lipase    2. Fatigue, unspecified type  -     CBC & Differential  -     Comprehensive Metabolic Panel  -     Amylase  -     Lipase  -     POCT SARS-CoV-2 Antigen VENITA    3. Chronic low back pain, unspecified back pain laterality, unspecified whether sciatica present  -     triamcinolone acetonide (KENALOG-40) injection 80 mg      Complete ordered lab work   We will call with results    POC testing for Covid negative           Follow Up   Return if symptoms worsen or fail to improve, for Recheck, Next scheduled follow up.  Patient was given instructions and counseling regarding his condition or for health maintenance advice. Please see specific information pulled into the AVS if appropriate.           This document has been electronically signed by KWESI Burrell on May 3, 2022 12:32 CDT

## 2022-05-05 DIAGNOSIS — R10.10 PAIN OF UPPER ABDOMEN: Primary | ICD-10-CM

## 2022-05-09 ENCOUNTER — LAB (OUTPATIENT)
Dept: LAB | Facility: HOSPITAL | Age: 72
End: 2022-05-09

## 2022-05-09 DIAGNOSIS — R10.10 PAIN OF UPPER ABDOMEN: ICD-10-CM

## 2022-05-09 LAB
ALBUMIN SERPL-MCNC: 4.6 G/DL (ref 3.5–5.2)
ALBUMIN/GLOB SERPL: 1.6 G/DL
ALP SERPL-CCNC: 81 U/L (ref 39–117)
ALT SERPL W P-5'-P-CCNC: 72 U/L (ref 1–41)
AMYLASE SERPL-CCNC: 145 U/L (ref 28–100)
ANION GAP SERPL CALCULATED.3IONS-SCNC: 11.1 MMOL/L (ref 5–15)
AST SERPL-CCNC: 45 U/L (ref 1–40)
BILIRUB SERPL-MCNC: 0.7 MG/DL (ref 0–1.2)
BUN SERPL-MCNC: 12 MG/DL (ref 8–23)
BUN/CREAT SERPL: 14.5 (ref 7–25)
CALCIUM SPEC-SCNC: 9.6 MG/DL (ref 8.6–10.5)
CHLORIDE SERPL-SCNC: 101 MMOL/L (ref 98–107)
CO2 SERPL-SCNC: 24.9 MMOL/L (ref 22–29)
CREAT SERPL-MCNC: 0.83 MG/DL (ref 0.76–1.27)
EGFRCR SERPLBLD CKD-EPI 2021: 93.6 ML/MIN/1.73
GLOBULIN UR ELPH-MCNC: 2.8 GM/DL
GLUCOSE SERPL-MCNC: 93 MG/DL (ref 65–99)
LIPASE SERPL-CCNC: 66 U/L (ref 13–60)
POTASSIUM SERPL-SCNC: 3.9 MMOL/L (ref 3.5–5.2)
PROT SERPL-MCNC: 7.4 G/DL (ref 6–8.5)
SODIUM SERPL-SCNC: 137 MMOL/L (ref 136–145)

## 2022-05-09 PROCEDURE — 80053 COMPREHEN METABOLIC PANEL: CPT

## 2022-05-09 PROCEDURE — 82150 ASSAY OF AMYLASE: CPT

## 2022-05-09 PROCEDURE — 36415 COLL VENOUS BLD VENIPUNCTURE: CPT

## 2022-05-09 PROCEDURE — 83690 ASSAY OF LIPASE: CPT

## 2022-05-10 ENCOUNTER — TELEPHONE (OUTPATIENT)
Dept: FAMILY MEDICINE CLINIC | Facility: CLINIC | Age: 72
End: 2022-05-10

## 2022-05-10 DIAGNOSIS — R10.10 PAIN OF UPPER ABDOMEN: Primary | ICD-10-CM

## 2022-05-10 DIAGNOSIS — R74.8 ELEVATED AMYLASE AND LIPASE: ICD-10-CM

## 2022-05-23 DIAGNOSIS — G62.9 PERIPHERAL POLYNEUROPATHY: ICD-10-CM

## 2022-05-23 RX ORDER — PREGABALIN 50 MG/1
50 CAPSULE ORAL 2 TIMES DAILY
Qty: 180 CAPSULE | Refills: 1 | Status: SHIPPED | OUTPATIENT
Start: 2022-05-23 | End: 2022-10-21

## 2022-05-23 NOTE — TELEPHONE ENCOUNTER
Incoming Refill Request      Medication requested (name and dose):    [facility]    Pharmacy where request should be sent:     [facility]    Additional details provided by patient:      Best call back number:     [phone]    Does the patient have less than a 3 day supply:  [x] Yes  [] No    Denilson Juarez Rep  05/23/22, 09:16 CDT

## 2022-05-25 ENCOUNTER — OFFICE VISIT (OUTPATIENT)
Dept: SLEEP MEDICINE | Facility: HOSPITAL | Age: 72
End: 2022-05-25

## 2022-05-25 VITALS
HEIGHT: 75 IN | HEART RATE: 55 BPM | OXYGEN SATURATION: 95 % | SYSTOLIC BLOOD PRESSURE: 140 MMHG | BODY MASS INDEX: 33.88 KG/M2 | WEIGHT: 272.5 LBS | DIASTOLIC BLOOD PRESSURE: 70 MMHG

## 2022-05-25 DIAGNOSIS — G47.33 OSA (OBSTRUCTIVE SLEEP APNEA): Primary | ICD-10-CM

## 2022-05-25 PROCEDURE — 99213 OFFICE O/P EST LOW 20 MIN: CPT | Performed by: NURSE PRACTITIONER

## 2022-05-25 NOTE — PROGRESS NOTES
Sleep Clinic Follow Up    Date: 2022  Primary Care Provider: Merrick Gorman MD    Last office visit: 2021 (I reviewed this note)    CC: Follow up: JAVIER on CPAP, annual       Interim History:  Since the last visit:    1) moderate JAVIER -  Robinson Quiroz has remained compliant with CPAP. He denies mask and machine issues, headaches, or pressures intolerance. He denies abnormal dreams, sleep paralysis, nasal congestion, URI sx.    His old machine was part of the Sh[employer] safety recall. He now has DreamStation2.   Occasional dry mouth. Overall doing well.        Sleep Testin. PSG on 04/10/2013, AHI of 18   2. CPAP titration on same day, recommended 10 cm H2O   3. Currently on 9 cm H2O    PAP Data:    Time frame: 2021-2022   Compliance: 99.4 %  Average use on days used: 8hrs 5 min  Percent of days with usage greater than or equal to 4 hours: 99.4%  PAP range: 9 cm H2O  Average 90% pressure: 9 cmH2O  Leak: 0 minutes  Average AHI: 0.9 events/hr  Mask type: Nasal pillows  DME: [facility]     Bed time: 3486-9856  Sleep latency: 5 minutes  Number of times awakens during the night: 1-2  Wake time: 0700  Estimated total sleep time at night: 7-8 hours  Caffeine intake: 2-3 cups of coffee, 0 cups of tea, and 0 sodas per day  Alcohol intake: 0 drinks per week  Nap time: denies    Sleepiness with Driving: denies      [place] - 9        PMHx, FH, SH reviewed and pertinent changes are:  unchanged from last office visit on 2021      REVIEW OF SYSTEMS:   Negative for chest pain, SOA, fever, chills, cough, N/V/D, abdominal pain.    Smoking:none, former            Exam:  Vitals:    22 1005   BP: 140/70   Pulse: 55   SpO2: 95%           22  1005   Weight: 124 kg (272 lb 8 oz)     Body mass index is 34.06 kg/m². BMI is >= 30 and <= 34.9 (Class 1 obesity). The following options were offered after discussion: nutrition counseling/recommendations        Gen:                No distress,  conversant, pleasant, appears stated age, alert, oriented  Eyes:               Anicteric sclera, moist conjunctiva, no lid lag                           EOMI   Lungs:             norml effort, non-labored breathing                          Clear to auscultation bilaterally          CV:                  Normal S1/S2, no murmur                          no lower extremity edema                 Psych:             Appropriate affect  Neuro:             CN 2-12 appear intact    Past Medical History:   Diagnosis Date   • Arthritis    • Coronary artery disease    • History of echocardiogram 09/13/2012    NRL LV systolic function w/ EF 60%. Mild CLVH. NRL diastolic function of LV myocardium. Left atrial enlargement   • HL (hearing loss)    • Hyperlipidemia    • Hypertension    • Kidney stone    • Obesity    • Pneumonia    • Stroke (HCC)        Current Outpatient Medications:   •  aspirin 81 MG tablet, Take 1 tablet by mouth Daily., Disp: 30 tablet, Rfl: 11  •  atorvastatin (LIPITOR) 80 MG tablet, TAKE 1 TABLET BY MOUTH EVERY DAY, Disp: 90 tablet, Rfl: 1  •  b complex-C-folic acid 1 MG capsule, Take 1 capsule by mouth Daily., Disp: , Rfl:   •  losartan-hydrochlorothiazide (HYZAAR) 50-12.5 MG per tablet, Take 1 tablet by mouth Daily., Disp: 90 tablet, Rfl: 2  •  metoprolol succinate XL (TOPROL-XL) 50 MG 24 hr tablet, TAKE 1 TABLET BY MOUTH EVERY DAY, Disp: 90 tablet, Rfl: 1  •  Multiple Vitamins-Minerals (MULTIVITAMIN ADULTS 50+ PO), Take 1 tablet by mouth., Disp: , Rfl:   •  Omega-3 Fatty Acids (FISH OIL) 1000 MG capsule capsule, Take 1,200 mg by mouth Daily With Breakfast., Disp: , Rfl:   •  pregabalin (LYRICA) 50 MG capsule, Take 1 capsule by mouth 2 (Two) Times a Day., Disp: 180 capsule, Rfl: 1  •  tamsulosin (FLOMAX) 0.4 MG capsule 24 hr capsule, Take 1 capsule by mouth Daily., Disp: 90 capsule, Rfl: 2      Assessment and Plan:    1. Obstructive sleep apnea    1. Compliant with PAP therapy  2. Continue PAP as  prescribed  3. Script for PAP supplies  4. Drowsy driving tips- do not drive if feeling sleepy   5. Return to clinic in 12 months with compliance report unless change in symptoms in interim period            I spent 15 minutes caring for Robinson on this date of service. This time includes time spent by me in the following activities: preparing for the visit, obtaining and/or reviewing a separately obtained history, performing a medically appropriate examination and/or evaluation, counseling and educating the patient/family/caregiver, ordering medications, tests, or procedures and documenting information in the medical record.           This document has been electronically signed by KWESI Canada on May 25, 2022 10:09 CDT            CC: Merrick Gorman MD          No ref. provider found

## 2022-05-26 ENCOUNTER — HOSPITAL ENCOUNTER (OUTPATIENT)
Dept: CT IMAGING | Facility: HOSPITAL | Age: 72
Discharge: HOME | End: 2022-05-26
Admitting: NURSE PRACTITIONER

## 2022-05-26 DIAGNOSIS — R74.8 ELEVATED AMYLASE AND LIPASE: ICD-10-CM

## 2022-05-26 DIAGNOSIS — R10.10 PAIN OF UPPER ABDOMEN: ICD-10-CM

## 2022-05-26 PROCEDURE — 25010000002 IOPAMIDOL 61 % SOLUTION: Performed by: NURSE PRACTITIONER

## 2022-05-26 PROCEDURE — 74160 CT ABDOMEN W/CONTRAST: CPT

## 2022-05-26 RX ADMIN — IOPAMIDOL 90 ML: 612 INJECTION, SOLUTION INTRAVENOUS at 12:57

## 2022-05-31 ENCOUNTER — TELEPHONE (OUTPATIENT)
Dept: FAMILY MEDICINE CLINIC | Facility: CLINIC | Age: 72
End: 2022-05-31

## 2022-06-03 ENCOUNTER — TELEPHONE (OUTPATIENT)
Dept: FAMILY MEDICINE CLINIC | Facility: CLINIC | Age: 72
End: 2022-06-03

## 2022-06-03 DIAGNOSIS — E66.01 MORBID (SEVERE) OBESITY DUE TO EXCESS CALORIES: ICD-10-CM

## 2022-06-03 DIAGNOSIS — K76.0 FATTY LIVER: Primary | ICD-10-CM

## 2022-06-03 DIAGNOSIS — E66.3 OVERWEIGHT: ICD-10-CM

## 2022-06-03 NOTE — TELEPHONE ENCOUNTER
Mr. Quiroz called asking Dr Gorman to give him a call ASAP. He states he has an emergency he needs to speak with him about. Please call back @ [phone]

## 2022-06-03 NOTE — TELEPHONE ENCOUNTER
Called patient, he stated he received a steroid shot at Shweta Leonard's office on 5/3 for back pain. The shot did not help, he is still in pain. Pain has radiated up back and into shoulders. Stated that he has called multiple times and concerned about recent diagnosis of fatty liver. Would like to know if ongoing pain could be from liver changes vs DDD vs steroid resistance.

## 2022-06-14 NOTE — TELEPHONE ENCOUNTER
PATIENT WOULD LIKE A CALL BACK WITH QUESTIONS ABOUT GETTING THE COVID VACCINE.  HE WOULD LIKE TO SPEAK TO A NURSE.    PLEASE CALL AND ADVISE  [phone]     PT DAILY TREATMENT NOTE     Patient Name: Clarissa Altamirano  Date:2022  : 1953  [x]  Patient  Verified  Payor: Dolores Varghese / Plan: [place] / Product Type: Medicare /    In time: 10:30  Out time: 11:15  Total Treatment Time (min): 45  Visit #: 6 of -    Medicare/BCBS Only   Total Timed Codes (min):  45 1:1 Treatment Time:  35       Treatment Area: Weakness of left lower extremity [R29.898]  Weakness of right lower extremity [R29.898]    SUBJECTIVE  Pain Level (0-10 scale): 0/10  Any medication changes, allergies to medications, adverse drug reactions, diagnosis change, or new procedure performed?: [x] No    [] Yes (see summary sheet for update)  Subjective functional status/changes:   [] No changes reported  Pt reports still walking 45 minutes at home and working on his strengthening exercises. He states he needs hand taps for balance working on tandem at home and SLS.      OBJECTIVE    30 min Therapeutic Exercise:  [x] See flow sheet :   Rationale: increase ROM, increase strength, improve coordination and improve balance to improve the patients ability to ease with ambulation tolerance    15 min Neuromuscular Re-education:  [x] See flow sheet : balance interventions   Rationale: increase ROM, increase strength, improve coordination and improve balance to improve the patients ability to ease with ambulation tolerance            With   [] TE   [] TA   [] neuro   [] other: Patient Education: [x] Review HEP    [] Progressed/Changed HEP based on:   [] positioning   [] body mechanics   [] transfers   [] heat/ice application    [] other:      Other Objective/Functional Measures:   Challenged with TKE stability to avoid over flexing in SLS and maintain without locking in extension  LOB with tandem  Decreased IR strength so worked on with assistance for side lying clam #2 but then worked on hold with eccentric lower with therapist assist  During standing pt can flex his right knee but fatigues quickly and has increased hip ER due to decreased IR stability  Cues for heel strike to avoid toe drag with ambulation but improves post prolonged incline stretch  Challenged with foam EC romberg but can perform with feet slightly wider apart; notice increase left weight shift and use of left ankle > right ankle for balance    Pain Level (0-10 scale) post treatment: 0/10    ASSESSMENT/Changes in Function: Pt remains highly motivated to progress strength, balance and flexibility in therapy. He demonstrates decreased ankle stability for balancing on compliant surfaces and struggles with vision removed. He has improving right hip strength but lacks most strength/activation in the right hip internal rotators. Will continue with balance and strength progression out of synergy to improve safety with ambulation and endurance. Patient will continue to benefit from skilled PT services to modify and progress therapeutic interventions, address functional mobility deficits, address ROM deficits, address strength deficits, analyze and address soft tissue restrictions, analyze and cue movement patterns, analyze and modify body mechanics/ergonomics, assess and modify postural abnormalities and address imbalance/dizziness to attain remaining goals. [x]  See Plan of Care  [x]  See progress note/recertification  []  See Discharge Summary         Progress towards goals / Updated goals:  in 4 weeks:  1. Pt will increase FOTO score by 11 pts to improve function.   2. Pt will ambulate on various surfaces including outdoor terrain with SPC,  w/o LOB to improve ambulation tolerance outdoors to be able to go on Vacation .   3. Pt will improve Bilateral Hip flexor strength to 5/5 to ease with stepping strategies and negotiating steps and curbs.    4. Pt will hold an abdominal Tuck/Hold ex for 15 sec x5 to improve with core strength for improved Ambulation endurance.    Current: challenged with 10\" holds (5/27/22)   5. Pt will perform Modified Tandem stance EO x 30 sec to ease with progressive balance strategies during outdoor Ambulation.   Current: challenged with tandem EO and MSR EC (5/27/22) MET (6/3/22)    PLAN  [x]  Upgrade activities as tolerated     [x]  Continue plan of care  []  Update interventions per flow sheet       []  Discharge due to:_  []  Other:_      Karen Garces, PTA 6/14/2022  1:27 PM    Future Appointments   Date Time Provider Marc Ruvalcaba   6/14/2022 10:30 AM Genette Netters, PTA [facility]   6/17/2022 12:00 PM Stevenenisukhjinder De Leon, PT [facility]   6/20/2022  3:00 PM Genette Netters, PTA [facility]   6/23/2022 10:30 AM Genette Netters, PTA [facility]   6/27/2022 10:30 AM Genette Netters, PTA [facility]   6/30/2022 10:30 AM Genette Netters, PTA [facility]   8/30/2022 10:00 AM Allen Monteiro   9/6/2022 10:00 AM PRANAY Gates   12/8/2022  8:00 AM Madonna Herrera MD [facility]

## 2022-06-15 ENCOUNTER — TELEPHONE (OUTPATIENT)
Dept: FAMILY MEDICINE CLINIC | Facility: CLINIC | Age: 72
End: 2022-06-15

## 2022-06-15 DIAGNOSIS — E66.01 MORBID (SEVERE) OBESITY DUE TO EXCESS CALORIES: Primary | ICD-10-CM

## 2022-06-16 NOTE — TELEPHONE ENCOUNTER
Spoke to patient and told him I would try to contact Giselle Gordon and see what I could find out and try to call him back on Monday.  I have sent Giselle an In Basket message to get back with me.

## 2022-06-20 NOTE — TELEPHONE ENCOUNTER
Spoke to patient and let him know I have not heard back from Giselle Gordon, but was not sure of her schedule.  I told him I would find something out for him as soon as I can.

## 2022-06-30 ENCOUNTER — OFFICE VISIT (OUTPATIENT)
Dept: GASTROENTEROLOGY | Facility: CLINIC | Age: 72
End: 2022-06-30

## 2022-06-30 VITALS
SYSTOLIC BLOOD PRESSURE: 141 MMHG | HEART RATE: 60 BPM | WEIGHT: 269.2 LBS | DIASTOLIC BLOOD PRESSURE: 67 MMHG | BODY MASS INDEX: 33.47 KG/M2 | HEIGHT: 75 IN

## 2022-06-30 DIAGNOSIS — R11.0 NAUSEA: ICD-10-CM

## 2022-06-30 DIAGNOSIS — R10.10 PAIN OF UPPER ABDOMEN: Primary | ICD-10-CM

## 2022-06-30 DIAGNOSIS — E71.30 DISORDER OF FATTY-ACID METABOLISM, UNSPECIFIED: ICD-10-CM

## 2022-06-30 DIAGNOSIS — Z80.0 FAMILY HISTORY OF GI MALIGNANCY: ICD-10-CM

## 2022-06-30 DIAGNOSIS — R74.8 ELEVATED LIVER ENZYMES: ICD-10-CM

## 2022-06-30 DIAGNOSIS — R93.2 ABNORMAL FINDINGS ON DIAGNOSTIC IMAGING OF LIVER AND BILIARY TRACT: ICD-10-CM

## 2022-06-30 DIAGNOSIS — K76.0 FATTY (CHANGE OF) LIVER, NOT ELSEWHERE CLASSIFIED: ICD-10-CM

## 2022-06-30 DIAGNOSIS — R74.8 ABNORMAL SERUM LEVEL OF LIPASE: ICD-10-CM

## 2022-06-30 PROCEDURE — 99204 OFFICE O/P NEW MOD 45 MIN: CPT | Performed by: PHYSICIAN ASSISTANT

## 2022-06-30 RX ORDER — DEXTROSE AND SODIUM CHLORIDE 5; .45 G/100ML; G/100ML
30 INJECTION, SOLUTION INTRAVENOUS CONTINUOUS PRN
Status: CANCELLED | OUTPATIENT
Start: 2022-08-02

## 2022-06-30 RX ORDER — SODIUM, POTASSIUM,MAG SULFATES 17.5-3.13G
SOLUTION, RECONSTITUTED, ORAL ORAL
Qty: 354 ML | Refills: 0 | Status: SHIPPED | OUTPATIENT
Start: 2022-06-30 | End: 2022-07-26 | Stop reason: ALTCHOICE

## 2022-06-30 NOTE — PROGRESS NOTES
Chief Complaint   Patient presents with   • Fatty Liver     Ref. Dr. Sherif ORDONEZ PROCEDURE ORDERED: EGD/COLON n, abd pain, elevated enzymes, +FH, screen    Subjective    Robinson Quiroz is a 72 y.o. male. he is being seen for consultation today at the request of Merrick Gorman MD    History of Present Illness    This 72-year-old retired  was sent for consultation for fatty liver by Dr. Gorman and KWESI Burrell, who saw the patient for abdominal pain and was given Kenalog on 05/03/2022. Last saw the patient on 05/27/2015. Underwent screening colonoscopy on 06/24/2015 that showed diverticulosis at sigmoid colon and hemorrhoids with a recommended repeat at 5 years. He has a strong family history of GI tract malignancies particularly pancreatic cancer in this mother. He is on Lipitor and aspirin. At times he is having some abdominal pain, sometimes high up in his back. He has had an elevation in his liver enzymes mildly for some time at least the last several years. He does have some nausea. No heartburn, no vomiting, no dysphagia. Bowel movements are fairly regular without blood or mucus. Weight is up 8 pounds since last visit.     Patient had laboratories on 05/03/2022 and was negative for COVID. Had normal lipase but an elevated amylase of 308. CMP showed a glucose of 117, AST 43, ALT 57, otherwise normal. Normal CBC. Laboratory on 05/09/2022, lipase was slightly increased at 66, amylase was increased at 145. CMP showed an AST of 45, ALT 72, otherwise normal. Patient underwent CT of abdomen on 05/26/2022 that showed gynecomastia bilaterally. Fluid in the distal esophagus with mild wall thickening. Thick-walled stomach, duodenal diverticulum, right renal calculus, moderate increased stool, degeneration of the spine, fatty liver, contracted gallbladder.     Patient currently denies tobacco, alcohol or illicit substance use. He was a previous smoker for over 20 years. He has had a fractured skull,  open heart surgery in . Family history of diabetes, heart disease, cancer, gallstones, hypertension. No known family history of liver disease. Father  at age 85 with Alzheimer. Mother  at age 57 with liver and pancreas cancer. Three brothers in good health, 1 child in good health.     ASSESSMENT/PLAN:  Patient with nausea, increasing GERD symptoms, epigastric pain with mild elevation in pancreatic and liver enzymes. Suspect possible peptic ulcer disease. I have recommended EGD. We will do biopsies as indicated to evaluate. Given his family history of GI tract malignancy, I am very concerned about the possibility of malignancy given his mild elevation in his numbers. Recommended colonoscopy as it has been over 5 years since his last screening colonoscopy. I did recommend an amylase and lipase, serum trypsin, hepatitis diagnostic panel, ROSALES FibroSURE, LFTs. I did try to order a CA 19-9 but insurance will not cover unless he has a known lesion. We will plan followup after the above, further pending clinical course and the results of the above.     Thank you very much, Dr. Gorman, for this consultation and for allowing us to participate in the care of your patient. We will keep you informed.      The following portions of the patient's history were reviewed and updated as appropriate:   Past Medical History:   Diagnosis Date   • Arthritis    • Coronary artery disease    • History of echocardiogram 2012    NRL LV systolic function w/ EF 60%. Mild CLVH. NRL diastolic function of LV myocardium. Left atrial enlargement   • HL (hearing loss)    • Hyperlipidemia    • Hypertension    • Kidney stone    • Obesity    • Pneumonia    • Stroke (HCC)      Past Surgical History:   Procedure Laterality Date   • CARDIAC SURGERY  10/2004   • COLONOSCOPY  2015    Diverticulosis found in the sigmoid colon. Hemorrhoids found in the anus.   • CORONARY ARTERY BYPASS GRAFT     • HERNIA REPAIR       Family History    Problem Relation Age of Onset   • Cancer Mother    • Diabetes Mother    • Early death Mother    • Hearing loss Mother    • Hyperlipidemia Mother    • Liver disease Mother    • Pancreatic cancer Mother    • Heart disease Father    • Stroke Father    • Alzheimer's disease Father    • Arthritis Maternal Aunt    • Arthritis Maternal Uncle    • Alcohol abuse Paternal Uncle    • Arthritis Maternal Grandmother    • Arthritis Maternal Grandfather    • Hearing loss Daughter        Allergies   Allergen Reactions   • Penicillins Anaphylaxis   • Adhesive Tape Other (See Comments)     Takes his skin off   • Latex Rash     Social History     Socioeconomic History   • Marital status:    Tobacco Use   • Smoking status: Former Smoker     Types: Cigarettes     Quit date: 2003     Years since quittin.8   • Smokeless tobacco: Never Used   • Tobacco comment: smoked 1pd or more for less than 20 years   Vaping Use   • Vaping Use: Never used   Substance and Sexual Activity   • Alcohol use: No   • Drug use: No   • Sexual activity: Yes     Partners: Female     Current Medications:  Prior to Admission medications    Medication Sig Start Date End Date Taking? Authorizing Provider   aspirin 81 MG tablet Take 1 tablet by mouth Daily.   Yes Manuel Alcaraz MD   atorvastatin (LIPITOR) 80 MG tablet TAKE 1 TABLET BY MOUTH EVERY DAY 22  Yes Merrick Gorman MD   b complex-C-folic acid 1 MG capsule Take 1 capsule by mouth Daily.   Yes Geremias Mendieta MD   losartan-hydrochlorothiazide (HYZAAR) 50-12.5 MG per tablet Take 1 tablet by mouth Daily. 21  Yes Merrick Gorman MD   metoprolol succinate XL (TOPROL-XL) 50 MG 24 hr tablet TAKE 1 TABLET BY MOUTH EVERY DAY 22  Yes Merrick Gorman MD   Multiple Vitamins-Minerals (MULTIVITAMIN ADULTS 50+ PO) Take 1 tablet by mouth.   Yes Geremias Mendieta MD   Omega-3 Fatty Acids (FISH OIL) 1000 MG capsule capsule Take 1,200 mg by mouth Daily With Breakfast.    "Yes Provider, MD Geremias   pregabalin (LYRICA) 50 MG capsule Take 1 capsule by mouth 2 (Two) Times a Day. 5/23/22  Yes Merrick Gorman MD   tamsulosin (FLOMAX) 0.4 MG capsule 24 hr capsule Take 1 capsule by mouth Daily. 9/7/21  Yes Merrick Gorman MD     Review of Systems  Review of Systems   Constitutional: Negative for unexpected weight change.   HENT: Negative for trouble swallowing.    Gastrointestinal: Positive for abdominal pain and nausea. Negative for anal bleeding, blood in stool, constipation, diarrhea and vomiting.          Objective    /67   Pulse 60   Ht 190.5 cm (75\")   Wt 122 kg (269 lb 3.2 oz)   BMI 33.65 kg/m²   Physical Exam  Vitals and nursing note reviewed.   Constitutional:       General: He is not in acute distress.     Appearance: He is well-developed. He is obese.   HENT:      Head: Normocephalic and atraumatic.   Eyes:      Pupils: Pupils are equal, round, and reactive to light.   Cardiovascular:      Rate and Rhythm: Normal rate and regular rhythm.      Heart sounds: Normal heart sounds.   Pulmonary:      Effort: Pulmonary effort is normal.      Breath sounds: Normal breath sounds.   Abdominal:      General: Bowel sounds are normal. There is no distension or abdominal bruit.      Palpations: Abdomen is soft. Abdomen is not rigid. There is no shifting dullness or mass.      Tenderness: There is abdominal tenderness. There is no guarding or rebound.      Hernia: No hernia is present. There is no hernia in the ventral area.   Musculoskeletal:         General: Normal range of motion.      Cervical back: Normal range of motion.   Skin:     General: Skin is warm and dry.   Neurological:      Mental Status: He is alert and oriented to person, place, and time.   Psychiatric:         Behavior: Behavior normal.         Thought Content: Thought content normal.         Judgment: Judgment normal.       Assessment & Plan      1. Pain of upper abdomen    2. Nausea    3. Elevated " liver enzymes    4. Family history of GI malignancy    5. Abnormal serum level of lipase    6. Abnormal findings on diagnostic imaging of liver and biliary tract     7. Fatty (change of) liver, not elsewhere classified     8. Disorder of fatty-acid metabolism, unspecified     .   Diagnoses and all orders for this visit:    1. Pain of upper abdomen (Primary)  -     Iron and TIBC  -     Ferritin  -     AFP Tumor Marker  -     Alpha - 1 - Antitrypsin  -     Anti-Smooth Muscle Antibody Titer  -     Ceruloplasmin  -     Protime-INR  -     Nuclear Antigen Antibody, IFA  -     ROSALES Fibrosure  -     Mitochondrial Antibodies, M2  -     Hepatitis Panel, Acute  -     Trypsin  -     Case Request; Standing  -     Case Request    2. Nausea  -     Iron and TIBC  -     Ferritin  -     AFP Tumor Marker  -     Alpha - 1 - Antitrypsin  -     Anti-Smooth Muscle Antibody Titer  -     Ceruloplasmin  -     Protime-INR  -     Nuclear Antigen Antibody, IFA  -     ROSALES Fibrosure  -     Mitochondrial Antibodies, M2  -     Hepatitis Panel, Acute  -     Trypsin  -     Case Request; Standing  -     Case Request    3. Elevated liver enzymes  -     Iron and TIBC  -     Ferritin  -     AFP Tumor Marker  -     Alpha - 1 - Antitrypsin  -     Anti-Smooth Muscle Antibody Titer  -     Ceruloplasmin  -     Protime-INR  -     Nuclear Antigen Antibody, IFA  -     ROSALES Fibrosure  -     Mitochondrial Antibodies, M2  -     Hepatitis Panel, Acute  -     Trypsin  -     Case Request; Standing  -     Case Request    4. Family history of GI malignancy  -     Case Request; Standing  -     Case Request    5. Abnormal serum level of lipase  -     Iron and TIBC  -     Ferritin  -     AFP Tumor Marker  -     Alpha - 1 - Antitrypsin  -     Anti-Smooth Muscle Antibody Titer  -     Ceruloplasmin  -     Protime-INR  -     Nuclear Antigen Antibody, IFA  -     ROSALES Fibrosure  -     Mitochondrial Antibodies, M2  -     Hepatitis Panel, Acute  -     Trypsin  -     Case Request;  Standing  -     Case Request    6. Abnormal findings on diagnostic imaging of liver and biliary tract   -     AFP Tumor Marker    7. Fatty (change of) liver, not elsewhere classified   -     ROSALES Fibrosure    8. Disorder of fatty-acid metabolism, unspecified   -     ROSALES Fibrosure    Other orders  -     Follow Anesthesia Guidelines / Standing Orders; Future  -     Obtain Informed Consent; Future  -     sodium-potassium-magnesium sulfates (Suprep Bowel Prep Kit) 17.5-3.13-1.6 GM/177ML solution oral solution; As directed per instruction sheet for colonoscopy  Dispense: 354 mL; Refill: 0        Orders placed during this encounter include:  Orders Placed This Encounter   Procedures   • Iron and TIBC   • Ferritin   • AFP Tumor Marker     Order Specific Question:   Release to patient     Answer:   Immediate   • Alpha - 1 - Antitrypsin     Order Specific Question:   Release to patient     Answer:   Immediate   • Anti-Smooth Muscle Antibody Titer     Order Specific Question:   Release to patient     Answer:   Immediate   • Ceruloplasmin     Order Specific Question:   Release to patient     Answer:   Immediate   • Protime-INR   • Nuclear Antigen Antibody, IFA     Order Specific Question:   Release to patient     Answer:   Immediate   • ROSALES Fibrosure     Order Specific Question:   Release to patient     Answer:   Immediate   • Mitochondrial Antibodies, M2     Order Specific Question:   Release to patient     Answer:   Immediate   • Hepatitis Panel, Acute     Order Specific Question:   Release to patient     Answer:   Immediate   • Trypsin     Order Specific Question:   Release to patient     Answer:   Immediate   • Follow Anesthesia Guidelines / Standing Orders     Standing Status:   Future   • Obtain Informed Consent     Standing Status:   Future     Order Specific Question:   Informed Consent Given For     Answer:   ESOPHAGOGASTRODUODENOSCOPY and colonoscopy       Medications prescribed:  New Medications Ordered This Visit    Medications   • sodium-potassium-magnesium sulfates (Suprep Bowel Prep Kit) 17.5-3.13-1.6 GM/177ML solution oral solution     Sig: As directed per instruction sheet for colonoscopy     Dispense:  354 mL     Refill:  0       Requested Prescriptions     Signed Prescriptions Disp Refills   • sodium-potassium-magnesium sulfates (Suprep Bowel Prep Kit) 17.5-3.13-1.6 GM/177ML solution oral solution 354 mL 0     Sig: As directed per instruction sheet for colonoscopy       Review and/or summary of lab tests, radiology, procedures, medications. Review and summary of old records and obtaining of history. The risks and benefits of my recommendations, as well as other treatment options were discussed with the patient today. Questions were answered.    Follow-up: Return in about 1 month (around 7/30/2022), or if symptoms worsen or fail to improve.     ESOPHAGOGASTRODUODENOSCOPY (N/A), COLONOSCOPY (N/A)      This document has been electronically signed by Garcia Box PA-C on June 30, 2022 18:55 CDT      Results for orders placed or performed in visit on 05/09/22   Lipase    Specimen: Blood   Result Value Ref Range    Lipase 66 (H) 13 - 60 U/L   Amylase    Specimen: Blood   Result Value Ref Range    Amylase 145 (H) 28 - 100 U/L   Comprehensive Metabolic Panel    Specimen: Blood   Result Value Ref Range    Glucose 93 65 - 99 mg/dL    BUN 12 8 - 23 mg/dL    Creatinine 0.83 0.76 - 1.27 mg/dL    Sodium 137 136 - 145 mmol/L    Potassium 3.9 3.5 - 5.2 mmol/L    Chloride 101 98 - 107 mmol/L    CO2 24.9 22.0 - 29.0 mmol/L    Calcium 9.6 8.6 - 10.5 mg/dL    Total Protein 7.4 6.0 - 8.5 g/dL    Albumin 4.60 3.50 - 5.20 g/dL    ALT (SGPT) 72 (H) 1 - 41 U/L    AST (SGOT) 45 (H) 1 - 40 U/L    Alkaline Phosphatase 81 39 - 117 U/L    Total Bilirubin 0.7 0.0 - 1.2 mg/dL    Globulin 2.8 gm/dL    A/G Ratio 1.6 g/dL    BUN/Creatinine Ratio 14.5 7.0 - 25.0    Anion Gap 11.1 5.0 - 15.0 mmol/L    eGFR 93.6 >60.0 mL/min/1.73   Results for orders  placed or performed in visit on 05/03/22   POCT SARS-CoV-2 Antigen VENITA    Specimen: Swab   Result Value Ref Range    SARS Antigen Not Detected Not Detected, Presumptive Negative    Internal Control Passed Passed    Lot Number [id number]     Expiration Date 10/16/2022    CBC Auto Differential    Specimen: Blood   Result Value Ref Range    WBC 6.99 3.40 - 10.80 10*3/mm3    RBC 4.94 4.14 - 5.80 10*6/mm3    Hemoglobin 15.0 13.0 - 17.7 g/dL    Hematocrit 42.6 37.5 - 51.0 %    MCV 86.2 79.0 - 97.0 fL    MCH 30.4 26.6 - 33.0 pg    MCHC 35.2 31.5 - 35.7 g/dL    RDW 12.8 12.3 - 15.4 %    RDW-SD 39.5 37.0 - 54.0 fl    MPV 9.6 6.0 - 12.0 fL    Platelets 174 140 - 450 10*3/mm3    Neutrophil % 53.5 42.7 - 76.0 %    Lymphocyte % 27.8 19.6 - 45.3 %    Monocyte % 14.0 (H) 5.0 - 12.0 %    Eosinophil % 2.6 0.3 - 6.2 %    Basophil % 0.7 0.0 - 1.5 %    Immature Grans % 1.4 (H) 0.0 - 0.5 %    Neutrophils, Absolute 3.74 1.70 - 7.00 10*3/mm3    Lymphocytes, Absolute 1.94 0.70 - 3.10 10*3/mm3    Monocytes, Absolute 0.98 (H) 0.10 - 0.90 10*3/mm3    Eosinophils, Absolute 0.18 0.00 - 0.40 10*3/mm3    Basophils, Absolute 0.05 0.00 - 0.20 10*3/mm3    Immature Grans, Absolute 0.10 (H) 0.00 - 0.05 10*3/mm3    nRBC 0.0 0.0 - 0.2 /100 WBC   Lipase    Specimen: Blood   Result Value Ref Range    Lipase 26 13 - 60 U/L   Amylase    Specimen: Blood   Result Value Ref Range    Amylase 308 (H) 28 - 100 U/L   Comprehensive Metabolic Panel    Specimen: Blood   Result Value Ref Range    Glucose 117 (H) 65 - 99 mg/dL    BUN 12 8 - 23 mg/dL    Creatinine 0.82 0.76 - 1.27 mg/dL    Sodium 136 136 - 145 mmol/L    Potassium 3.5 3.5 - 5.2 mmol/L    Chloride 101 98 - 107 mmol/L    CO2 25.0 22.0 - 29.0 mmol/L    Calcium 9.3 8.6 - 10.5 mg/dL    Total Protein 7.1 6.0 - 8.5 g/dL    Albumin 4.30 3.50 - 5.20 g/dL    ALT (SGPT) 57 (H) 1 - 41 U/L    AST (SGOT) 43 (H) 1 - 40 U/L    Alkaline Phosphatase 75 39 - 117 U/L    Total Bilirubin 0.9 0.0 - 1.2 mg/dL    Globulin 2.8  gm/dL    A/G Ratio 1.5 g/dL    BUN/Creatinine Ratio 14.6 7.0 - 25.0    Anion Gap 10.0 5.0 - 15.0 mmol/L    eGFR 93.9 >60.0 mL/min/1.73   Results for orders placed or performed in visit on 10/18/21   ECG 12 Lead   Result Value Ref Range    QT Interval 434 ms    QTC Interval 418 ms   Results for orders placed or performed in visit on 05/27/21   CBC Auto Differential    Specimen: Blood   Result Value Ref Range    WBC 5.65 3.40 - 10.80 10*3/mm3    RBC 4.99 4.14 - 5.80 10*6/mm3    Hemoglobin 15.5 13.0 - 17.7 g/dL    Hematocrit 44.8 37.5 - 51.0 %    MCV 89.8 79.0 - 97.0 fL    MCH 31.1 26.6 - 33.0 pg    MCHC 34.6 31.5 - 35.7 g/dL    RDW 12.9 12.3 - 15.4 %    RDW-SD 41.8 37.0 - 54.0 fl    MPV 11.0 6.0 - 12.0 fL    Platelets 146 140 - 450 10*3/mm3   Manual Differential    Specimen: Blood   Result Value Ref Range    Neutrophil % 51.5 42.7 - 76.0 %    Lymphocyte % 39.2 19.6 - 45.3 %    Monocyte % 5.2 5.0 - 12.0 %    Eosinophil % 4.1 0.3 - 6.2 %    Neutrophils Absolute 2.91 1.70 - 7.00 10*3/mm3    Lymphocytes Absolute 2.21 0.70 - 3.10 10*3/mm3    Monocytes Absolute 0.29 0.10 - 0.90 10*3/mm3    Eosinophils Absolute 0.23 0.00 - 0.40 10*3/mm3    RBC Morphology Normal Normal    WBC Morphology Normal Normal    Platelet Morphology Normal Normal   Lipid panel    Specimen: Blood   Result Value Ref Range    Total Cholesterol 107 0 - 200 mg/dL    Triglycerides 142 0 - 150 mg/dL    HDL Cholesterol 46 40 - 60 mg/dL    LDL Cholesterol  37 0 - 100 mg/dL    VLDL Cholesterol 24 5 - 40 mg/dL    LDL/HDL Ratio 0.71      *Note: Due to a large number of results and/or encounters for the requested time period, some results have not been displayed. A complete set of results can be found in Results Review.

## 2022-07-08 ENCOUNTER — LAB (OUTPATIENT)
Dept: LAB | Facility: HOSPITAL | Age: 72
End: 2022-07-08

## 2022-07-08 LAB
ALPHA-FETOPROTEIN: 4 NG/ML (ref 0–8.3)
ALPHA1 GLOB MFR UR ELPH: 145 MG/DL (ref 90–200)
CERULOPLASMIN SERPL-MCNC: 24 MG/DL (ref 16–31)
FERRITIN SERPL-MCNC: 212 NG/ML (ref 30–400)
HAV IGM SERPL QL IA: NORMAL
HBV CORE IGM SERPL QL IA: NORMAL
HBV SURFACE AG SERPL QL IA: NORMAL
HCV AB SER DONR QL: NORMAL
INR PPP: 0.99 (ref 0.8–1.2)
IRON 24H UR-MRATE: 106 MCG/DL (ref 59–158)
IRON SATN MFR SERPL: 29 % (ref 20–50)
PROTHROMBIN TIME: 12.9 SECONDS (ref 11.1–15.3)
TIBC SERPL-MCNC: 361 MCG/DL (ref 298–536)
TRANSFERRIN SERPL-MCNC: 242 MG/DL (ref 200–360)

## 2022-07-08 PROCEDURE — 86381 MITOCHONDRIAL ANTIBODY EACH: CPT | Performed by: PHYSICIAN ASSISTANT

## 2022-07-08 PROCEDURE — 84466 ASSAY OF TRANSFERRIN: CPT | Performed by: PHYSICIAN ASSISTANT

## 2022-07-08 PROCEDURE — 82947 ASSAY GLUCOSE BLOOD QUANT: CPT | Performed by: PHYSICIAN ASSISTANT

## 2022-07-08 PROCEDURE — 82172 ASSAY OF APOLIPOPROTEIN: CPT | Performed by: PHYSICIAN ASSISTANT

## 2022-07-08 PROCEDURE — 82390 ASSAY OF CERULOPLASMIN: CPT | Performed by: PHYSICIAN ASSISTANT

## 2022-07-08 PROCEDURE — 82728 ASSAY OF FERRITIN: CPT | Performed by: PHYSICIAN ASSISTANT

## 2022-07-08 PROCEDURE — 83519 RIA NONANTIBODY: CPT | Performed by: PHYSICIAN ASSISTANT

## 2022-07-08 PROCEDURE — 85610 PROTHROMBIN TIME: CPT | Performed by: PHYSICIAN ASSISTANT

## 2022-07-08 PROCEDURE — 82105 ALPHA-FETOPROTEIN SERUM: CPT | Performed by: PHYSICIAN ASSISTANT

## 2022-07-08 PROCEDURE — 84450 TRANSFERASE (AST) (SGOT): CPT | Performed by: PHYSICIAN ASSISTANT

## 2022-07-08 PROCEDURE — 36415 COLL VENOUS BLD VENIPUNCTURE: CPT | Performed by: PHYSICIAN ASSISTANT

## 2022-07-08 PROCEDURE — 82977 ASSAY OF GGT: CPT | Performed by: PHYSICIAN ASSISTANT

## 2022-07-08 PROCEDURE — 82465 ASSAY BLD/SERUM CHOLESTEROL: CPT | Performed by: PHYSICIAN ASSISTANT

## 2022-07-08 PROCEDURE — 84478 ASSAY OF TRIGLYCERIDES: CPT | Performed by: PHYSICIAN ASSISTANT

## 2022-07-08 PROCEDURE — 84460 ALANINE AMINO (ALT) (SGPT): CPT | Performed by: PHYSICIAN ASSISTANT

## 2022-07-08 PROCEDURE — 83883 ASSAY NEPHELOMETRY NOT SPEC: CPT | Performed by: PHYSICIAN ASSISTANT

## 2022-07-08 PROCEDURE — 80074 ACUTE HEPATITIS PANEL: CPT | Performed by: PHYSICIAN ASSISTANT

## 2022-07-08 PROCEDURE — 86015 ACTIN ANTIBODY EACH: CPT | Performed by: PHYSICIAN ASSISTANT

## 2022-07-08 PROCEDURE — 86038 ANTINUCLEAR ANTIBODIES: CPT | Performed by: PHYSICIAN ASSISTANT

## 2022-07-08 PROCEDURE — 83010 ASSAY OF HAPTOGLOBIN QUANT: CPT | Performed by: PHYSICIAN ASSISTANT

## 2022-07-08 PROCEDURE — 82247 BILIRUBIN TOTAL: CPT | Performed by: PHYSICIAN ASSISTANT

## 2022-07-08 PROCEDURE — 83540 ASSAY OF IRON: CPT | Performed by: PHYSICIAN ASSISTANT

## 2022-07-08 PROCEDURE — 82103 ALPHA-1-ANTITRYPSIN TOTAL: CPT | Performed by: PHYSICIAN ASSISTANT

## 2022-07-09 LAB
MITOCHONDRIA M2 IGG SER-ACNC: <20 UNITS (ref 0–20)
SMA IGG SER-ACNC: 4 UNITS (ref 0–19)

## 2022-07-11 LAB
A2 MACROGLOB SERPL-MCNC: 386 MG/DL (ref 110–276)
ALT SERPL W P-5'-P-CCNC: 52 IU/L (ref 0–55)
ANA SER QL IF: NEGATIVE
APO A-I SERPL-MCNC: 149 MG/DL (ref 101–178)
AST SERPL W P-5'-P-CCNC: 40 IU/L (ref 0–40)
BILIRUB SERPL-MCNC: 0.5 MG/DL (ref 0–1.2)
CHOLEST SERPL-MCNC: 113 MG/DL (ref 100–199)
FIBROSIS SCORING:: ABNORMAL
FIBROSIS STAGE SERPL QL: ABNORMAL
GGT SERPL-CCNC: 51 IU/L (ref 0–65)
GLUCOSE SERPL-MCNC: 96 MG/DL (ref 65–99)
HAPTOGLOB SERPL-MCNC: 194 MG/DL (ref 34–355)
INTERPRETATIONS: (REFERENCE): ABNORMAL
LABORATORY COMMENT REPORT: ABNORMAL
LIVER FIBR SCORE SERPL CALC.FIBROSURE: 0.64 (ref 0–0.21)
NASH SCORING (REFERENCE): ABNORMAL
NECROINFLAMMATORY ACT GRADE SERPL QL: ABNORMAL
NECROINFLAMMATORY ACT SCORE SERPL: 0.5
SERVICE CMNT-IMP: ABNORMAL
SPECIMEN STATUS: NORMAL
STEATOSIS GRADE (REFERENCE): ABNORMAL
STEATOSIS GRADING (REFERENCE): ABNORMAL
STEATOSIS SCORE (REFERENCE): 0.54 (ref 0–0.3)
TRIGL SERPL-MCNC: 147 MG/DL (ref 0–149)

## 2022-07-26 ENCOUNTER — TELEPHONE (OUTPATIENT)
Dept: GASTROENTEROLOGY | Facility: CLINIC | Age: 72
End: 2022-07-26

## 2022-07-26 ENCOUNTER — OFFICE VISIT (OUTPATIENT)
Dept: ENDOCRINOLOGY | Facility: CLINIC | Age: 72
End: 2022-07-26

## 2022-07-26 DIAGNOSIS — E66.01 MORBID (SEVERE) OBESITY DUE TO EXCESS CALORIES: ICD-10-CM

## 2022-07-26 NOTE — TELEPHONE ENCOUNTER
Pt called stating Suprep cost too much. I sent in Golytely to [facility] pharmacy and faxed over instructions for pharm to attach to prescription.

## 2022-07-26 NOTE — PROGRESS NOTES
Robinson Quiroz is a 72 y.o. male referred for outpatient diabetes education by Dr. Gorman. Patient attended individual education for 30 minutes on 07/26/2022. Topics covered included:     1. Healthy Food Choices   i. Provided patient with detailed carbohydrate counting guide.    ii. Instructed patient to eat 45-60 grams of carbohydrate with each meal (3-4 exchange choices) and 15 grams of carb for snacks (1 exchange choices).   iii. Provided patient with list of non-starchy vegetables and foods that are low in carbohydrate for snacks and to incorporate with meals (Planning Healthy Meals Packet).    iv. Choose fruits, vegetables, whole grains, legumes, low-fat milk, fiber-rich foods, minimal saturated fats, and watch cholesterol and sodium intake.    v. Reviewed carbohydrate-containing foods, standard serving sizes, and measuring foods.   vi. Reviewed the difference between simple and complex carbohydrate. Encouraged patient to choose complex carbohydrates more often.      Provided patient with Diabetes and You book, Planning Healthy Meals book, and Nutrition In the [employer] book. Provided handout of sample menu with carbs listed.      Thank you Dr. Gorman for this referral.        CARLOS Diane, RD, LD

## 2022-07-28 ENCOUNTER — TELEPHONE (OUTPATIENT)
Dept: FAMILY MEDICINE CLINIC | Facility: CLINIC | Age: 72
End: 2022-07-28

## 2022-08-02 ENCOUNTER — ANESTHESIA EVENT (OUTPATIENT)
Dept: GASTROENTEROLOGY | Facility: HOSPITAL | Age: 72
End: 2022-08-02

## 2022-08-02 ENCOUNTER — ANESTHESIA (OUTPATIENT)
Dept: GASTROENTEROLOGY | Facility: HOSPITAL | Age: 72
End: 2022-08-02

## 2022-08-02 ENCOUNTER — HOSPITAL ENCOUNTER (OUTPATIENT)
Facility: HOSPITAL | Age: 72
Setting detail: HOSPITAL OUTPATIENT SURGERY
Discharge: HOME | End: 2022-08-02
Attending: INTERNAL MEDICINE | Admitting: INTERNAL MEDICINE

## 2022-08-02 VITALS
OXYGEN SATURATION: 94 % | HEIGHT: 75 IN | SYSTOLIC BLOOD PRESSURE: 144 MMHG | HEART RATE: 74 BPM | TEMPERATURE: 98.2 F | BODY MASS INDEX: 32.89 KG/M2 | WEIGHT: 264.55 LBS | RESPIRATION RATE: 18 BRPM | DIASTOLIC BLOOD PRESSURE: 65 MMHG

## 2022-08-02 DIAGNOSIS — R74.8 ELEVATED LIVER ENZYMES: ICD-10-CM

## 2022-08-02 DIAGNOSIS — R74.8 ABNORMAL SERUM LEVEL OF LIPASE: ICD-10-CM

## 2022-08-02 DIAGNOSIS — R11.0 NAUSEA: ICD-10-CM

## 2022-08-02 DIAGNOSIS — R10.10 PAIN OF UPPER ABDOMEN: ICD-10-CM

## 2022-08-02 DIAGNOSIS — Z80.0 FAMILY HISTORY OF GI MALIGNANCY: ICD-10-CM

## 2022-08-02 PROCEDURE — 25010000002 PROPOFOL 10 MG/ML EMULSION

## 2022-08-02 PROCEDURE — 88305 TISSUE EXAM BY PATHOLOGIST: CPT

## 2022-08-02 PROCEDURE — 43239 EGD BIOPSY SINGLE/MULTIPLE: CPT | Performed by: INTERNAL MEDICINE

## 2022-08-02 PROCEDURE — G0105 COLORECTAL SCRN; HI RISK IND: HCPCS | Performed by: INTERNAL MEDICINE

## 2022-08-02 RX ORDER — PROPOFOL 10 MG/ML
VIAL (ML) INTRAVENOUS AS NEEDED
Status: DISCONTINUED | OUTPATIENT
Start: 2022-08-02 | End: 2022-08-02 | Stop reason: SURG

## 2022-08-02 RX ORDER — LIDOCAINE HYDROCHLORIDE 20 MG/ML
INJECTION, SOLUTION INTRAVENOUS AS NEEDED
Status: DISCONTINUED | OUTPATIENT
Start: 2022-08-02 | End: 2022-08-02 | Stop reason: SURG

## 2022-08-02 RX ORDER — DEXTROSE AND SODIUM CHLORIDE 5; .45 G/100ML; G/100ML
30 INJECTION, SOLUTION INTRAVENOUS CONTINUOUS PRN
Status: DISCONTINUED | OUTPATIENT
Start: 2022-08-02 | End: 2022-08-02 | Stop reason: HOSPADM

## 2022-08-02 RX ADMIN — LIDOCAINE HYDROCHLORIDE 100 MG: 20 INJECTION, SOLUTION INTRAVENOUS at 15:03

## 2022-08-02 RX ADMIN — DEXTROSE AND SODIUM CHLORIDE 30 ML/HR: 5; 450 INJECTION, SOLUTION INTRAVENOUS at 14:20

## 2022-08-02 RX ADMIN — PROPOFOL 320 MG: 10 INJECTION, EMULSION INTRAVENOUS at 15:04

## 2022-08-02 RX ADMIN — DEXTROSE AND SODIUM CHLORIDE 30 ML/HR: 5; 450 INJECTION, SOLUTION INTRAVENOUS at 14:24

## 2022-08-02 RX ADMIN — PROPOFOL 80 MG: 10 INJECTION, EMULSION INTRAVENOUS at 15:03

## 2022-08-02 NOTE — ANESTHESIA POSTPROCEDURE EVALUATION
Patient: Robinson Quiroz    Procedure Summary     Date: 08/02/22 Room / Location: [facility] ENDOSCOPY 1 / [facility] ENDOSCOPY    Anesthesia Start: 1502 Anesthesia Stop: 1530    Procedures:       ESOPHAGOGASTRODUODENOSCOPY (N/A )      COLONOSCOPY (N/A ) Diagnosis:       Pain of upper abdomen      Nausea      Elevated liver enzymes      Family history of GI malignancy      Abnormal serum level of lipase      (Pain of upper abdomen [R10.10])      (Nausea [R11.0])      (Elevated liver enzymes [R74.8])      (Family history of GI malignancy [Z80.0])      (Abnormal serum level of lipase [R74.8])    Surgeons: Ricky Stauffer MD Provider: Sully Sharma CRNA    Anesthesia Type: MAC ASA Status: 3          Anesthesia Type: MAC    Vitals  No vitals data found for the desired time range.          Post Anesthesia Care and Evaluation    Patient location during evaluation: PHASE II  Patient participation: complete - patient cannot participate  Level of consciousness: sleepy but conscious  Pain score: 0  Pain management: adequate    Airway patency: patent  Anesthetic complications: No anesthetic complications  PONV Status: none  Cardiovascular status: acceptable  Respiratory status: acceptable  Hydration status: acceptable  No anesthesia care post op

## 2022-08-02 NOTE — H&P
No chief complaint on file.      ENDO PROCEDURE ORDERED: EGD/COLON n, abd pain, elevated enzymes, +FH, screen    Subjective    Robinson Quiroz is a 72 y.o. male. he is being seen for consultation today at the request of Merrick Gorman MD   I have reviewed the notes, assessments, and/or procedures performed by Garcia Box PA-C. I concur with her/his documentation of Robinson Quiroz.      History of Present Illness    This 72-year-old retired  was sent for consultation for fatty liver by Dr. Gorman and KWESI Burrell, who saw the patient for abdominal pain and was given Kenalog on 05/03/2022. Last saw the patient on 05/27/2015. Underwent screening colonoscopy on 06/24/2015 that showed diverticulosis at sigmoid colon and hemorrhoids with a recommended repeat at 5 years. He has a strong family history of GI tract malignancies particularly pancreatic cancer in this mother. He is on Lipitor and aspirin. At times he is having some abdominal pain, sometimes high up in his back. He has had an elevation in his liver enzymes mildly for some time at least the last several years. He does have some nausea. No heartburn, no vomiting, no dysphagia. Bowel movements are fairly regular without blood or mucus. Weight is up 8 pounds since last visit.     Patient had laboratories on 05/03/2022 and was negative for COVID. Had normal lipase but an elevated amylase of 308. CMP showed a glucose of 117, AST 43, ALT 57, otherwise normal. Normal CBC. Laboratory on 05/09/2022, lipase was slightly increased at 66, amylase was increased at 145. CMP showed an AST of 45, ALT 72, otherwise normal. Patient underwent CT of abdomen on 05/26/2022 that showed gynecomastia bilaterally. Fluid in the distal esophagus with mild wall thickening. Thick-walled stomach, duodenal diverticulum, right renal calculus, moderate increased stool, degeneration of the spine, fatty liver, contracted gallbladder.     Patient currently denies tobacco,  alcohol or illicit substance use. He was a previous smoker for over 20 years. He has had a fractured skull, open heart surgery in . Family history of diabetes, heart disease, cancer, gallstones, hypertension. No known family history of liver disease. Father  at age 85 with Alzheimer. Mother  at age 57 with liver and pancreas cancer. Three brothers in good health, 1 child in good health.     ASSESSMENT/PLAN:  Patient with nausea, increasing GERD symptoms, epigastric pain with mild elevation in pancreatic and liver enzymes. Suspect possible peptic ulcer disease. I have recommended EGD. We will do biopsies as indicated to evaluate. Given his family history of GI tract malignancy, I am very concerned about the possibility of malignancy given his mild elevation in his numbers. Recommended colonoscopy as it has been over 5 years since his last screening colonoscopy. I did recommend an amylase and lipase, serum trypsin, hepatitis diagnostic panel, ROSALES FibroSURE, LFTs. I did try to order a CA 19-9 but insurance will not cover unless he has a known lesion. We will plan followup after the above, further pending clinical course and the results of the above.     Thank you very much, Dr. Gorman, for this consultation and for allowing us to participate in the care of your patient. We will keep you informed.      The following portions of the patient's history were reviewed and updated as appropriate:   Past Medical History:   Diagnosis Date   • Arthritis    • Coronary artery disease    • History of echocardiogram 2012    NRL LV systolic function w/ EF 60%. Mild CLVH. NRL diastolic function of LV myocardium. Left atrial enlargement   • HL (hearing loss)    • Hyperlipidemia    • Hypertension    • Kidney stone    • Obesity    • Pneumonia    • Stroke (HCC)      Past Surgical History:   Procedure Laterality Date   • CARDIAC SURGERY  10/2004   • COLONOSCOPY  2015    Diverticulosis found in the sigmoid colon.  Hemorrhoids found in the anus.   • CORONARY ARTERY BYPASS GRAFT     • HERNIA REPAIR       Family History   Problem Relation Age of Onset   • Cancer Mother    • Diabetes Mother    • Early death Mother    • Hearing loss Mother    • Hyperlipidemia Mother    • Liver disease Mother    • Pancreatic cancer Mother    • Heart disease Father    • Stroke Father    • Alzheimer's disease Father    • Arthritis Maternal Aunt    • Arthritis Maternal Uncle    • Alcohol abuse Paternal Uncle    • Arthritis Maternal Grandmother    • Arthritis Maternal Grandfather    • Hearing loss Daughter        Allergies   Allergen Reactions   • Penicillins Anaphylaxis   • Adhesive Tape Other (See Comments)     Takes his skin off   • Latex Rash     Social History     Socioeconomic History   • Marital status: Single   Tobacco Use   • Smoking status: Former Smoker     Types: Cigarettes     Quit date: 2003     Years since quittin.9   • Smokeless tobacco: Never Used   • Tobacco comment: smoked 1pd or more for less than 20 years   Vaping Use   • Vaping Use: Never used   Substance and Sexual Activity   • Alcohol use: No   • Drug use: No   • Sexual activity: Yes     Partners: Female     Current Medications:  Prior to Admission medications    Medication Sig Start Date End Date Taking? Authorizing Provider   aspirin 81 MG tablet Take 1 tablet by mouth Daily.   Yes Manuel Alcaraz MD   atorvastatin (LIPITOR) 80 MG tablet TAKE 1 TABLET BY MOUTH EVERY DAY 22  Yes Merrick Gorman MD   b complex-C-folic acid 1 MG capsule Take 1 capsule by mouth Daily.   Yes Geremias Mendieta MD   losartan-hydrochlorothiazide (HYZAAR) 50-12.5 MG per tablet Take 1 tablet by mouth Daily. 21  Yes Merrick Gorman MD   metoprolol succinate XL (TOPROL-XL) 50 MG 24 hr tablet TAKE 1 TABLET BY MOUTH EVERY DAY 22  Yes Merrick Gorman MD   Multiple Vitamins-Minerals (MULTIVITAMIN ADULTS 50+ PO) Take 1 tablet by mouth.   Yes Geremias Mendieta MD  "  Omega-3 Fatty Acids (FISH OIL) 1000 MG capsule capsule Take 1,200 mg by mouth Daily With Breakfast.   Yes Provider, MD Geremias   pregabalin (LYRICA) 50 MG capsule Take 1 capsule by mouth 2 (Two) Times a Day. 5/23/22  Yes Merrick Gorman MD   tamsulosin (FLOMAX) 0.4 MG capsule 24 hr capsule Take 1 capsule by mouth Daily. 9/7/21  Yes Merrick Gorman MD     Review of Systems  Review of Systems   Constitutional: Negative for unexpected weight change.   HENT: Negative for trouble swallowing.    Gastrointestinal: Positive for abdominal pain and nausea. Negative for anal bleeding, blood in stool, constipation, diarrhea and vomiting.          Objective    /65   Pulse 95   Temp 98.2 °F (36.8 °C)   Resp 18   Ht 190.5 cm (75\")   Wt 120 kg (264 lb 8.8 oz)   SpO2 94%   BMI 33.07 kg/m²   Physical Exam  Vitals and nursing note reviewed.   Constitutional:       General: He is not in acute distress.     Appearance: He is well-developed. He is obese.   HENT:      Head: Normocephalic and atraumatic.   Eyes:      Pupils: Pupils are equal, round, and reactive to light.   Cardiovascular:      Rate and Rhythm: Normal rate and regular rhythm.      Heart sounds: Normal heart sounds.   Pulmonary:      Effort: Pulmonary effort is normal.      Breath sounds: Normal breath sounds.   Abdominal:      General: Bowel sounds are normal. There is no distension or abdominal bruit.      Palpations: Abdomen is soft. Abdomen is not rigid. There is no shifting dullness or mass.      Tenderness: There is abdominal tenderness. There is no guarding or rebound.      Hernia: No hernia is present. There is no hernia in the ventral area.   Musculoskeletal:         General: Normal range of motion.      Cervical back: Normal range of motion.   Skin:     General: Skin is warm and dry.   Neurological:      Mental Status: He is alert and oriented to person, place, and time.   Psychiatric:         Behavior: Behavior normal.         Thought " Content: Thought content normal.         Judgment: Judgment normal.       Assessment & Plan      1. Nausea    2. Abnormal serum level of lipase    3. Elevated liver enzymes    4. Family history of GI malignancy    5. Pain of upper abdomen    .   Diagnoses and all orders for this visit:    1. Nausea  -     dextrose 5 % and sodium chloride 0.45 % infusion    2. Abnormal serum level of lipase  -     dextrose 5 % and sodium chloride 0.45 % infusion    3. Elevated liver enzymes  -     dextrose 5 % and sodium chloride 0.45 % infusion    4. Family history of GI malignancy  -     dextrose 5 % and sodium chloride 0.45 % infusion    5. Pain of upper abdomen  -     dextrose 5 % and sodium chloride 0.45 % infusion    Other orders  -     Insert Peripheral IV; Standing  -     Obtain Informed Consent; Standing  -     POC Glucose Once; Standing  -     Obtain Informed Consent  -     POC Glucose Once  -     Insert Peripheral IV        Orders placed during this encounter include:  Orders Placed This Encounter   Procedures   • Obtain Informed Consent     Standing Status:   Standing     Number of Occurrences:   1     Order Specific Question:   Informed Consent Given For     Answer:   ESOPHAGOGASTRODUODENOSCOPY and colonoscopy   • POC Glucose Once     Prior to Procedure on ALL Diabetic Patients     Standing Status:   Standing     Number of Occurrences:   1     Order Specific Question:   Release to patient     Answer:   Immediate   • Insert Peripheral IV     Standing Status:   Standing     Number of Occurrences:   1       Medications prescribed:  New Medications Ordered This Visit   Medications   • dextrose 5 % and sodium chloride 0.45 % infusion       Requested Prescriptions     Signed Prescriptions Disp Refills   • sodium-potassium-magnesium sulfates (Suprep Bowel Prep Kit) 17.5-3.13-1.6 GM/177ML solution oral solution 354 mL 0     Sig: As directed per instruction sheet for colonoscopy       Review and/or summary of lab tests,  radiology, procedures, medications. Review and summary of old records and obtaining of history. The risks and benefits of my recommendations, as well as other treatment options were discussed with the patient today. Questions were answered.    Follow-up: No follow-ups on file.     ESOPHAGOGASTRODUODENOSCOPY (N/A), COLONOSCOPY (N/A)      This document has been electronically signed by Ricky Stauffer MD on August 2, 2022 14:17 CDT      Results for orders placed or performed in visit on 06/30/22   Specimen Status Report    Specimen: Blood   Result Value Ref Range    Specimen Status Comment    ROSALES Fibrosure    Specimen: Blood   Result Value Ref Range    Fibrosis Score (References) 0.64 (H) 0.00 - 0.21    Fibrosis Stage (Reference) Comment     Steatosis Score (Reference) 0.54 (H) 0.00 - 0.30    Steatosis Grade (Reference) Comment     ROSALES Score (Reference) 0.50 (H) 0.25    Rosales Grade (Reference) Comment     Height: (Reference) 75 in    Weight: (Reference) 269 LBS    Alpha 2-Macroglobulins, Qn 386 (H) 110 - 276 mg/dL    Haptoglobin 194 34 - 355 mg/dL    Apolipoprotein A-1 149 101 - 178 mg/dL    Total Bilirubin 0.5 0.0 - 1.2 mg/dL    GGT 51 0 - 65 IU/L    ALT (SGPT) 52 0 - 55 IU/L    AST (SGOT) P5P (Reference) 40 0 - 40 IU/L    Cholesterol, Total (Reference) 113 100 - 199 mg/dL    Glucose, Serum (Reference) 96 65 - 99 mg/dL    Triglycerides 147 0 - 149 mg/dL    Interpretations: (Reference) Comment     Fibrosis Scoring: Comment     Steatosis Grading (Reference) Comment     Rosales Scoring (Reference) Comment     Limitations: (Reference) Comment     Comment (Reference) Comment    Nuclear Antigen Antibody, IFA    Specimen: Blood   Result Value Ref Range    [place] Negative    Iron and TIBC    Specimen: Blood   Result Value Ref Range    Iron 106 59 - 158 mcg/dL    Iron Saturation 29 20 - 50 %    Transferrin 242 200 - 360 mg/dL    TIBC 361 298 - 536 mcg/dL   Alpha - 1 - Antitrypsin    Specimen: Blood   Result Value Ref Range     ALPHA -1 ANTITRYPSIN 145 90 - 200 mg/dL   Mitochondrial Antibodies, M2    Specimen: Blood   Result Value Ref Range    Mitochondrial Ab <20.0 0.0 - 20.0 Units   Ceruloplasmin    Specimen: Blood   Result Value Ref Range    Ceruloplasmin 24 16 - 31 mg/dL   AFP Tumor Marker    Specimen: Blood   Result Value Ref Range    ALPHA-FETOPROTEIN 4 0 - 8.3 ng/mL   Hepatitis Panel, Acute    Specimen: Blood   Result Value Ref Range    Hepatitis B Surface Ag Non-Reactive Non-Reactive    Hep A IgM Non-Reactive Non-Reactive    Hep B C IgM Non-Reactive Non-Reactive    Hepatitis C Ab Non-Reactive Non-Reactive   Anti-Smooth Muscle Antibody Titer    Specimen: Blood   Result Value Ref Range    Smooth Muscle Ab 4 0 - 19 Units   Protime-INR    Specimen: Blood   Result Value Ref Range    Protime 12.9 11.1 - 15.3 Seconds    INR 0.99 0.80 - 1.20   Ferritin    Specimen: Blood   Result Value Ref Range    Ferritin 212.00 30.00 - 400.00 ng/mL   Results for orders placed or performed in visit on 05/09/22   Lipase    Specimen: Blood   Result Value Ref Range    Lipase 66 (H) 13 - 60 U/L   Amylase    Specimen: Blood   Result Value Ref Range    Amylase 145 (H) 28 - 100 U/L   Comprehensive Metabolic Panel    Specimen: Blood   Result Value Ref Range    Glucose 93 65 - 99 mg/dL    BUN 12 8 - 23 mg/dL    Creatinine 0.83 0.76 - 1.27 mg/dL    Sodium 137 136 - 145 mmol/L    Potassium 3.9 3.5 - 5.2 mmol/L    Chloride 101 98 - 107 mmol/L    CO2 24.9 22.0 - 29.0 mmol/L    Calcium 9.6 8.6 - 10.5 mg/dL    Total Protein 7.4 6.0 - 8.5 g/dL    Albumin 4.60 3.50 - 5.20 g/dL    ALT (SGPT) 72 (H) 1 - 41 U/L    AST (SGOT) 45 (H) 1 - 40 U/L    Alkaline Phosphatase 81 39 - 117 U/L    Total Bilirubin 0.7 0.0 - 1.2 mg/dL    Globulin 2.8 gm/dL    A/G Ratio 1.6 g/dL    BUN/Creatinine Ratio 14.5 7.0 - 25.0    Anion Gap 11.1 5.0 - 15.0 mmol/L    eGFR 93.6 >60.0 mL/min/1.73   Results for orders placed or performed in visit on 05/03/22   POCT SARS-CoV-2 Antigen VENITA    Specimen:  Swab   Result Value Ref Range    SARS Antigen Not Detected Not Detected, Presumptive Negative    Internal Control Passed Passed    Lot Number [id number]     Expiration Date 10/16/2022    CBC Auto Differential    Specimen: Blood   Result Value Ref Range    WBC 6.99 3.40 - 10.80 10*3/mm3    RBC 4.94 4.14 - 5.80 10*6/mm3    Hemoglobin 15.0 13.0 - 17.7 g/dL    Hematocrit 42.6 37.5 - 51.0 %    MCV 86.2 79.0 - 97.0 fL    MCH 30.4 26.6 - 33.0 pg    MCHC 35.2 31.5 - 35.7 g/dL    RDW 12.8 12.3 - 15.4 %    RDW-SD 39.5 37.0 - 54.0 fl    MPV 9.6 6.0 - 12.0 fL    Platelets 174 140 - 450 10*3/mm3    Neutrophil % 53.5 42.7 - 76.0 %    Lymphocyte % 27.8 19.6 - 45.3 %    Monocyte % 14.0 (H) 5.0 - 12.0 %    Eosinophil % 2.6 0.3 - 6.2 %    Basophil % 0.7 0.0 - 1.5 %    Immature Grans % 1.4 (H) 0.0 - 0.5 %    Neutrophils, Absolute 3.74 1.70 - 7.00 10*3/mm3    Lymphocytes, Absolute 1.94 0.70 - 3.10 10*3/mm3    Monocytes, Absolute 0.98 (H) 0.10 - 0.90 10*3/mm3    Eosinophils, Absolute 0.18 0.00 - 0.40 10*3/mm3    Basophils, Absolute 0.05 0.00 - 0.20 10*3/mm3    Immature Grans, Absolute 0.10 (H) 0.00 - 0.05 10*3/mm3    nRBC 0.0 0.0 - 0.2 /100 WBC   Lipase    Specimen: Blood   Result Value Ref Range    Lipase 26 13 - 60 U/L   Amylase    Specimen: Blood   Result Value Ref Range    Amylase 308 (H) 28 - 100 U/L   Comprehensive Metabolic Panel    Specimen: Blood   Result Value Ref Range    Glucose 117 (H) 65 - 99 mg/dL    BUN 12 8 - 23 mg/dL    Creatinine 0.82 0.76 - 1.27 mg/dL    Sodium 136 136 - 145 mmol/L    Potassium 3.5 3.5 - 5.2 mmol/L    Chloride 101 98 - 107 mmol/L    CO2 25.0 22.0 - 29.0 mmol/L    Calcium 9.3 8.6 - 10.5 mg/dL    Total Protein 7.1 6.0 - 8.5 g/dL    Albumin 4.30 3.50 - 5.20 g/dL    ALT (SGPT) 57 (H) 1 - 41 U/L    AST (SGOT) 43 (H) 1 - 40 U/L    Alkaline Phosphatase 75 39 - 117 U/L    Total Bilirubin 0.9 0.0 - 1.2 mg/dL    Globulin 2.8 gm/dL    A/G Ratio 1.5 g/dL    BUN/Creatinine Ratio 14.6 7.0 - 25.0    Anion Gap 10.0  5.0 - 15.0 mmol/L    eGFR 93.9 >60.0 mL/min/1.73     *Note: Due to a large number of results and/or encounters for the requested time period, some results have not been displayed. A complete set of results can be found in Results Review.

## 2022-08-02 NOTE — ANESTHESIA PREPROCEDURE EVALUATION
Anesthesia Evaluation     Patient summary reviewed and Nursing notes reviewed   NPO Solid Status: > 8 hours  NPO Liquid Status: > 2 hours           Airway   Mallampati: II  TM distance: >3 FB  Neck ROM: full  Possible difficult intubation  Dental    (+) lower dentures    Pulmonary - normal exam   (+) pneumonia , sleep apnea,   Cardiovascular - normal exam    (+) hypertension well controlled less than 2 medications, CAD, CABG >6 Months, hyperlipidemia,       Neuro/Psych  (+) CVA, numbness,    GI/Hepatic/Renal/Endo    (+) obesity,   renal disease,     Musculoskeletal     Abdominal    Substance History      OB/GYN          Other   arthritis,                      Anesthesia Plan    ASA 3     MAC     intravenous induction     Anesthetic plan, risks, benefits, and alternatives have been provided, discussed and informed consent has been obtained with: patient.        CODE STATUS:

## 2022-08-04 ENCOUNTER — LAB (OUTPATIENT)
Dept: LAB | Facility: HOSPITAL | Age: 72
End: 2022-08-04

## 2022-08-04 ENCOUNTER — OFFICE VISIT (OUTPATIENT)
Dept: FAMILY MEDICINE CLINIC | Facility: CLINIC | Age: 72
End: 2022-08-04

## 2022-08-04 VITALS
WEIGHT: 266 LBS | HEART RATE: 89 BPM | BODY MASS INDEX: 33.07 KG/M2 | SYSTOLIC BLOOD PRESSURE: 136 MMHG | DIASTOLIC BLOOD PRESSURE: 72 MMHG | HEIGHT: 75 IN | OXYGEN SATURATION: 97 %

## 2022-08-04 DIAGNOSIS — N40.1 BENIGN PROSTATIC HYPERPLASIA WITH INCOMPLETE BLADDER EMPTYING: ICD-10-CM

## 2022-08-04 DIAGNOSIS — R39.14 BENIGN PROSTATIC HYPERPLASIA WITH INCOMPLETE BLADDER EMPTYING: ICD-10-CM

## 2022-08-04 DIAGNOSIS — R39.12 WEAK URINARY STREAM: ICD-10-CM

## 2022-08-04 DIAGNOSIS — N41.9 PROSTATITIS, UNSPECIFIED PROSTATITIS TYPE: ICD-10-CM

## 2022-08-04 DIAGNOSIS — R39.12 WEAK URINARY STREAM: Primary | ICD-10-CM

## 2022-08-04 LAB — REF LAB TEST METHOD: NORMAL

## 2022-08-04 PROCEDURE — 99214 OFFICE O/P EST MOD 30 MIN: CPT | Performed by: FAMILY MEDICINE

## 2022-08-04 PROCEDURE — 81001 URINALYSIS AUTO W/SCOPE: CPT

## 2022-08-04 RX ORDER — TAMSULOSIN HYDROCHLORIDE 0.4 MG/1
CAPSULE ORAL
Qty: 60 CAPSULE | Refills: 0 | Status: SHIPPED | OUTPATIENT
Start: 2022-08-04 | End: 2022-09-12

## 2022-08-04 RX ORDER — DOXYCYCLINE HYCLATE 100 MG/1
100 CAPSULE ORAL 2 TIMES DAILY
Qty: 20 CAPSULE | Refills: 0 | Status: SHIPPED | OUTPATIENT
Start: 2022-08-04 | End: 2022-12-30

## 2022-08-04 NOTE — PROGRESS NOTES
Subjective   Robinson Quiroz is a 72 y.o. male.   Cc: follow up of chronic medical issues    HPI  The patient complains of developing a weak urinary stream. He is not able to empty his bladder ,he feels.     The following portions of the patient's history were reviewed and updated as appropriate: allergies, current medications, past family history, past medical history, past social history, past surgical history and problem list.    Review of Systems   Constitutional: Negative for fatigue and fever.   Respiratory: Negative for cough and shortness of breath.    Cardiovascular: Negative for palpitations and leg swelling.   Genitourinary: Positive for decreased urine volume, difficulty urinating, dysuria, penile pain and urgency. Negative for enuresis, flank pain, frequency, genital sores, hematuria, penile discharge, penile swelling, scrotal swelling and testicular pain.       Objective   Physical Exam  Vitals reviewed.   Constitutional:       Appearance: Normal appearance.   HENT:      Head: Normocephalic and atraumatic.      Right Ear: Tympanic membrane, ear canal and external ear normal.      Left Ear: Tympanic membrane, ear canal and external ear normal.      Nose: Nose normal.      Mouth/Throat:      Mouth: Mucous membranes are moist.      Pharynx: Oropharynx is clear.   Cardiovascular:      Rate and Rhythm: Normal rate and regular rhythm.      Heart sounds: Normal heart sounds. No murmur heard.    No friction rub. No gallop.   Pulmonary:      Effort: Pulmonary effort is normal. No respiratory distress.      Breath sounds: Normal breath sounds. No stridor. No wheezing, rhonchi or rales.   Chest:      Chest wall: No tenderness.   Abdominal:      General: Bowel sounds are normal. There is no distension.      Palpations: Abdomen is soft. There is no mass.      Tenderness: There is no abdominal tenderness. There is no guarding.      Hernia: No hernia is present.   Genitourinary:     Comments: Normal Sphincter tone.  "Prostate is tender on palpation.  Musculoskeletal:      Cervical back: Normal range of motion.   Skin:     General: Skin is warm and dry.   Neurological:      Mental Status: He is alert.           Visit Vitals  /72   Pulse 89   Ht 190.5 cm (75\")   Wt 121 kg (266 lb)   SpO2 97%   BMI 33.25 kg/m²     Body mass index is 33.25 kg/m².      Assessment/Plan   Diagnoses and all orders for this visit:    1. Weak urinary stream (Primary)  -     Urinalysis With Culture If Indicated -; Future    2. Benign prostatic hyperplasia with incomplete bladder emptying    3. Prostatitis, unspecified prostatitis type  -     Urinalysis With Culture If Indicated -; Future    Other orders  -     doxycycline (VIBRAMYCIN) 100 MG capsule; Take 1 capsule by mouth 2 (Two) Times a Day.  Dispense: 20 capsule; Refill: 0  -     tamsulosin (FLOMAX) 0.4 MG capsule 24 hr capsule; Take 2 capsules daily.  Dispense: 60 capsule; Refill: 0    He is told to stay out of the sun while on the medication or to wear sunscreen that covers A and B .  Follow up at next appointment.  I reviewed his CMP with the patient.  "

## 2022-08-05 LAB
BACTERIA UR QL AUTO: NORMAL /HPF
BILIRUB UR QL STRIP: NEGATIVE
CLARITY UR: CLEAR
COLOR UR: ABNORMAL
GLUCOSE UR STRIP-MCNC: NEGATIVE MG/DL
HGB UR QL STRIP.AUTO: NEGATIVE
HYALINE CASTS UR QL AUTO: NORMAL /LPF
KETONES UR QL STRIP: NEGATIVE
LEUKOCYTE ESTERASE UR QL STRIP.AUTO: ABNORMAL
NITRITE UR QL STRIP: NEGATIVE
PH UR STRIP.AUTO: 7 [PH] (ref 5–8)
PROT UR QL STRIP: NEGATIVE
RBC # UR STRIP: NORMAL /HPF
REF LAB TEST METHOD: NORMAL
SP GR UR STRIP: 1.01 (ref 1–1.03)
SQUAMOUS #/AREA URNS HPF: NORMAL /HPF
UROBILINOGEN UR QL STRIP: ABNORMAL
WBC # UR STRIP: NORMAL /HPF

## 2022-08-22 RX ORDER — ATORVASTATIN CALCIUM 80 MG/1
TABLET, FILM COATED ORAL
Qty: 90 TABLET | Refills: 1 | Status: SHIPPED | OUTPATIENT
Start: 2022-08-22 | End: 2022-11-07 | Stop reason: SDUPTHER

## 2022-09-12 ENCOUNTER — OFFICE VISIT (OUTPATIENT)
Dept: FAMILY MEDICINE CLINIC | Facility: CLINIC | Age: 72
End: 2022-09-12

## 2022-09-12 ENCOUNTER — LAB (OUTPATIENT)
Dept: LAB | Facility: HOSPITAL | Age: 72
End: 2022-09-12

## 2022-09-12 VITALS
HEIGHT: 75 IN | SYSTOLIC BLOOD PRESSURE: 128 MMHG | DIASTOLIC BLOOD PRESSURE: 72 MMHG | WEIGHT: 266.9 LBS | HEART RATE: 86 BPM | OXYGEN SATURATION: 99 % | BODY MASS INDEX: 33.18 KG/M2

## 2022-09-12 DIAGNOSIS — E78.00 PURE HYPERCHOLESTEROLEMIA: ICD-10-CM

## 2022-09-12 DIAGNOSIS — I10 ESSENTIAL HYPERTENSION: ICD-10-CM

## 2022-09-12 DIAGNOSIS — Z87.438 HISTORY OF BPH: ICD-10-CM

## 2022-09-12 DIAGNOSIS — G62.9 PERIPHERAL POLYNEUROPATHY: ICD-10-CM

## 2022-09-12 DIAGNOSIS — E78.00 PURE HYPERCHOLESTEROLEMIA: Primary | ICD-10-CM

## 2022-09-12 LAB
ALBUMIN SERPL-MCNC: 4.4 G/DL (ref 3.5–5.2)
ALBUMIN/GLOB SERPL: 1.8 G/DL
ALP SERPL-CCNC: 88 U/L (ref 39–117)
ALT SERPL W P-5'-P-CCNC: 51 U/L (ref 1–41)
ANION GAP SERPL CALCULATED.3IONS-SCNC: 11.8 MMOL/L (ref 5–15)
AST SERPL-CCNC: 41 U/L (ref 1–40)
BASOPHILS # BLD AUTO: 0.04 10*3/MM3 (ref 0–0.2)
BASOPHILS NFR BLD AUTO: 0.7 % (ref 0–1.5)
BILIRUB SERPL-MCNC: 0.8 MG/DL (ref 0–1.2)
BUN SERPL-MCNC: 8 MG/DL (ref 8–23)
BUN/CREAT SERPL: 10.4 (ref 7–25)
CALCIUM SPEC-SCNC: 9.1 MG/DL (ref 8.6–10.5)
CHLORIDE SERPL-SCNC: 99 MMOL/L (ref 98–107)
CHOLEST SERPL-MCNC: 102 MG/DL (ref 0–200)
CO2 SERPL-SCNC: 25.2 MMOL/L (ref 22–29)
CREAT SERPL-MCNC: 0.77 MG/DL (ref 0.76–1.27)
DEPRECATED RDW RBC AUTO: 40.7 FL (ref 37–54)
EGFRCR SERPLBLD CKD-EPI 2021: 95.7 ML/MIN/1.73
EOSINOPHIL # BLD AUTO: 0.13 10*3/MM3 (ref 0–0.4)
EOSINOPHIL NFR BLD AUTO: 2.2 % (ref 0.3–6.2)
ERYTHROCYTE [DISTWIDTH] IN BLOOD BY AUTOMATED COUNT: 12.6 % (ref 12.3–15.4)
GLOBULIN UR ELPH-MCNC: 2.5 GM/DL
GLUCOSE SERPL-MCNC: 96 MG/DL (ref 65–99)
HCT VFR BLD AUTO: 41.6 % (ref 37.5–51)
HDLC SERPL-MCNC: 42 MG/DL (ref 40–60)
HGB BLD-MCNC: 14.5 G/DL (ref 13–17.7)
IMM GRANULOCYTES # BLD AUTO: 0.03 10*3/MM3 (ref 0–0.05)
IMM GRANULOCYTES NFR BLD AUTO: 0.5 % (ref 0–0.5)
LDLC SERPL CALC-MCNC: 34 MG/DL (ref 0–100)
LDLC/HDLC SERPL: 0.7 {RATIO}
LYMPHOCYTES # BLD AUTO: 1.63 10*3/MM3 (ref 0.7–3.1)
LYMPHOCYTES NFR BLD AUTO: 28.1 % (ref 19.6–45.3)
MCH RBC QN AUTO: 30.8 PG (ref 26.6–33)
MCHC RBC AUTO-ENTMCNC: 34.9 G/DL (ref 31.5–35.7)
MCV RBC AUTO: 88.3 FL (ref 79–97)
MONOCYTES # BLD AUTO: 0.82 10*3/MM3 (ref 0.1–0.9)
MONOCYTES NFR BLD AUTO: 14.1 % (ref 5–12)
NEUTROPHILS NFR BLD AUTO: 3.16 10*3/MM3 (ref 1.7–7)
NEUTROPHILS NFR BLD AUTO: 54.4 % (ref 42.7–76)
NRBC BLD AUTO-RTO: 0 /100 WBC (ref 0–0.2)
PLATELET # BLD AUTO: 132 10*3/MM3 (ref 140–450)
PMV BLD AUTO: 10.3 FL (ref 6–12)
POTASSIUM SERPL-SCNC: 3.8 MMOL/L (ref 3.5–5.2)
PROT SERPL-MCNC: 6.9 G/DL (ref 6–8.5)
RBC # BLD AUTO: 4.71 10*6/MM3 (ref 4.14–5.8)
SODIUM SERPL-SCNC: 136 MMOL/L (ref 136–145)
TRIGL SERPL-MCNC: 154 MG/DL (ref 0–150)
VLDLC SERPL-MCNC: 26 MG/DL (ref 5–40)
WBC NRBC COR # BLD: 5.81 10*3/MM3 (ref 3.4–10.8)

## 2022-09-12 PROCEDURE — 99214 OFFICE O/P EST MOD 30 MIN: CPT | Performed by: FAMILY MEDICINE

## 2022-09-12 PROCEDURE — 85025 COMPLETE CBC W/AUTO DIFF WBC: CPT

## 2022-09-12 PROCEDURE — 36415 COLL VENOUS BLD VENIPUNCTURE: CPT

## 2022-09-12 PROCEDURE — 80053 COMPREHEN METABOLIC PANEL: CPT

## 2022-09-12 PROCEDURE — 80061 LIPID PANEL: CPT

## 2022-09-12 RX ORDER — LOSARTAN POTASSIUM AND HYDROCHLOROTHIAZIDE 12.5; 5 MG/1; MG/1
1 TABLET ORAL DAILY
Qty: 90 TABLET | Refills: 1 | Status: SHIPPED | OUTPATIENT
Start: 2022-09-12

## 2022-09-12 RX ORDER — TAMSULOSIN HYDROCHLORIDE 0.4 MG/1
CAPSULE ORAL
Qty: 180 CAPSULE | Refills: 1 | Status: SHIPPED | OUTPATIENT
Start: 2022-09-12

## 2022-09-12 RX ORDER — METOPROLOL SUCCINATE 50 MG/1
TABLET, EXTENDED RELEASE ORAL
Qty: 90 TABLET | Refills: 1 | Status: SHIPPED | OUTPATIENT
Start: 2022-09-12 | End: 2023-02-09

## 2022-09-12 RX ORDER — TAMSULOSIN HYDROCHLORIDE 0.4 MG/1
CAPSULE ORAL
Qty: 90 CAPSULE | Refills: 2 | Status: SHIPPED | OUTPATIENT
Start: 2022-09-12 | End: 2022-09-12

## 2022-09-12 NOTE — PROGRESS NOTES
Subjective   Robinson Quiroz is a 72 y.o. male.   Cc: follow up of chronic medical issues    Hyperlipidemia  This is a chronic problem. The current episode started more than 1 year ago. Recent lipid tests were reviewed and are variable. Pertinent negatives include no chest pain or shortness of breath. Current antihyperlipidemic treatment includes statins.   Hypertension  This is a chronic problem. The current episode started more than 1 year ago. The problem has been gradually improving since onset. Pertinent negatives include no anxiety, blurred vision, chest pain, headaches, malaise/fatigue, neck pain, orthopnea, palpitations, peripheral edema, PND or shortness of breath. Current antihypertension treatment includes angiotensin blockers, diuretics and beta blockers.   He has a history of BPH. It is doing better with Tamsulosin at 0.8 mg daily.  He continues his Lyrica for peripheral neuropathy.     The following portions of the patient's history were reviewed and updated as appropriate: allergies, current medications, past family history, past medical history, past social history, past surgical history and problem list.    Review of Systems   Constitutional: Negative for malaise/fatigue.   Eyes: Negative for blurred vision.   Respiratory: Negative for shortness of breath.    Cardiovascular: Negative for chest pain, palpitations, orthopnea and PND.   Musculoskeletal: Negative for neck pain.   Neurological: Negative for headaches.       Objective   Physical Exam  Vitals reviewed.   Constitutional:       Appearance: Normal appearance.   HENT:      Head: Normocephalic and atraumatic.      Right Ear: Tympanic membrane, ear canal and external ear normal.      Left Ear: Tympanic membrane, ear canal and external ear normal.      Nose: Nose normal.      Mouth/Throat:      Mouth: Mucous membranes are moist.      Pharynx: Oropharynx is clear.   Cardiovascular:      Rate and Rhythm: Normal rate and regular rhythm.      Heart  "sounds: Normal heart sounds. No murmur heard.    No friction rub. No gallop.   Pulmonary:      Effort: Pulmonary effort is normal. No respiratory distress.      Breath sounds: Normal breath sounds. No stridor. No wheezing, rhonchi or rales.   Chest:      Chest wall: No tenderness.   Abdominal:      General: Bowel sounds are normal. There is no distension.      Palpations: Abdomen is soft. There is no mass.      Tenderness: There is no abdominal tenderness. There is no guarding.      Hernia: No hernia is present.   Musculoskeletal:      Cervical back: Normal range of motion.   Skin:     General: Skin is warm and dry.   Neurological:      Mental Status: He is alert.           Visit Vitals  /72   Pulse 86   Ht 190.5 cm (75\")   Wt 121 kg (266 lb 14.4 oz)   SpO2 99%   BMI 33.36 kg/m²     Body mass index is 33.36 kg/m².      Assessment/Plan   Diagnoses and all orders for this visit:    1. Pure hypercholesterolemia (Primary)    2. Essential hypertension  -     losartan-hydrochlorothiazide (HYZAAR) 50-12.5 MG per tablet; Take 1 tablet by mouth Daily.  Dispense: 90 tablet; Refill: 1    3. Peripheral polyneuropathy    4. History of BPH    Other orders  -     tamsulosin (FLOMAX) 0.4 MG capsule 24 hr capsule; Take two daily  Dispense: 180 capsule; Refill: 1    I reviewed the CBC,CMP,and Lipid Profile with the patient.  Return to the clinic in 8 month/s.  Will contact with results as needed.    "

## 2022-09-15 DIAGNOSIS — R79.9 ABNORMAL FINDING OF BLOOD CHEMISTRY, UNSPECIFIED: ICD-10-CM

## 2022-09-15 DIAGNOSIS — I10 ESSENTIAL HYPERTENSION: ICD-10-CM

## 2022-09-15 DIAGNOSIS — E78.00 PURE HYPERCHOLESTEROLEMIA: ICD-10-CM

## 2022-09-15 DIAGNOSIS — Z13.1 SCREENING FOR DIABETES MELLITUS (DM): Primary | ICD-10-CM

## 2022-10-21 DIAGNOSIS — G62.9 PERIPHERAL POLYNEUROPATHY: ICD-10-CM

## 2022-10-21 RX ORDER — PREGABALIN 50 MG/1
CAPSULE ORAL
Qty: 180 CAPSULE | Refills: 1 | Status: SHIPPED | OUTPATIENT
Start: 2022-10-21 | End: 2023-02-08

## 2022-11-07 RX ORDER — ATORVASTATIN CALCIUM 80 MG/1
80 TABLET, FILM COATED ORAL DAILY
Qty: 90 TABLET | Refills: 1 | Status: SHIPPED | OUTPATIENT
Start: 2022-11-07

## 2022-11-07 NOTE — TELEPHONE ENCOUNTER
Incoming Refill Request      Medication requested (name and dose): ATORVASTATIN 80 MG    Pharmacy where request should be sent: [facility]    Additional details provided by patient: PT REQUESTED 90 DAY SUPPLY    Best call back number: [phone]    Does the patient have less than a 3 day supply:  [] Yes  [x] No    Denilson Gann Rep  11/07/22, 09:16 CST

## 2022-11-15 ENCOUNTER — CLINICAL SUPPORT (OUTPATIENT)
Dept: FAMILY MEDICINE CLINIC | Facility: CLINIC | Age: 72
End: 2022-11-15

## 2022-11-15 DIAGNOSIS — Z23 NEED FOR INFLUENZA VACCINATION: Primary | ICD-10-CM

## 2022-11-15 PROCEDURE — 90662 IIV NO PRSV INCREASED AG IM: CPT | Performed by: FAMILY MEDICINE

## 2022-11-15 PROCEDURE — G0008 ADMIN INFLUENZA VIRUS VAC: HCPCS | Performed by: FAMILY MEDICINE

## 2022-12-01 ENCOUNTER — CLINICAL SUPPORT (OUTPATIENT)
Dept: FAMILY MEDICINE CLINIC | Facility: CLINIC | Age: 72
End: 2022-12-01

## 2022-12-01 DIAGNOSIS — M54.41 CHRONIC BILATERAL LOW BACK PAIN WITH RIGHT-SIDED SCIATICA: Primary | ICD-10-CM

## 2022-12-01 DIAGNOSIS — G89.29 CHRONIC BILATERAL LOW BACK PAIN WITH RIGHT-SIDED SCIATICA: Primary | ICD-10-CM

## 2022-12-01 PROCEDURE — 96372 THER/PROPH/DIAG INJ SC/IM: CPT | Performed by: FAMILY MEDICINE

## 2022-12-01 RX ORDER — TRIAMCINOLONE ACETONIDE 40 MG/ML
80 INJECTION, SUSPENSION INTRA-ARTICULAR; INTRAMUSCULAR ONCE
Status: COMPLETED | OUTPATIENT
Start: 2022-12-01 | End: 2022-12-01

## 2022-12-01 RX ADMIN — TRIAMCINOLONE ACETONIDE 80 MG: 40 INJECTION, SUSPENSION INTRA-ARTICULAR; INTRAMUSCULAR at 08:13

## 2022-12-30 ENCOUNTER — OFFICE VISIT (OUTPATIENT)
Dept: CARDIOLOGY | Facility: CLINIC | Age: 72
End: 2022-12-30

## 2022-12-30 VITALS
OXYGEN SATURATION: 97 % | WEIGHT: 266 LBS | DIASTOLIC BLOOD PRESSURE: 80 MMHG | HEIGHT: 75 IN | HEART RATE: 63 BPM | SYSTOLIC BLOOD PRESSURE: 132 MMHG | BODY MASS INDEX: 33.07 KG/M2

## 2022-12-30 DIAGNOSIS — I10 HYPERTENSION, ESSENTIAL: ICD-10-CM

## 2022-12-30 DIAGNOSIS — E78.2 HYPERLIPEMIA, MIXED: ICD-10-CM

## 2022-12-30 DIAGNOSIS — I25.10 CORONARY ARTERY DISEASE INVOLVING NATIVE CORONARY ARTERY OF NATIVE HEART WITHOUT ANGINA PECTORIS: Primary | ICD-10-CM

## 2022-12-30 PROCEDURE — 99214 OFFICE O/P EST MOD 30 MIN: CPT | Performed by: INTERNAL MEDICINE

## 2022-12-30 PROCEDURE — 93000 ELECTROCARDIOGRAM COMPLETE: CPT | Performed by: INTERNAL MEDICINE

## 2022-12-30 RX ORDER — PANTOPRAZOLE SODIUM 40 MG/1
40 TABLET, DELAYED RELEASE ORAL DAILY
Qty: 30 TABLET | Refills: 3 | Status: SHIPPED | OUTPATIENT
Start: 2022-12-30 | End: 2023-01-10

## 2022-12-30 RX ORDER — RANOLAZINE 500 MG/1
500 TABLET, EXTENDED RELEASE ORAL 2 TIMES DAILY
Qty: 30 TABLET | Refills: 3 | Status: SHIPPED | OUTPATIENT
Start: 2022-12-30 | End: 2023-01-06

## 2022-12-30 RX ORDER — NITROGLYCERIN 0.3 MG/1
TABLET SUBLINGUAL
Qty: 100 TABLET | Refills: 11 | Status: SHIPPED | OUTPATIENT
Start: 2022-12-30

## 2022-12-30 NOTE — PROGRESS NOTES
[facility]  OFFICE NOTE    Cardiovascular Medicine  Manuel Alcaraz M.D., RPVI         No referring provider defined for this encounter.    Thank you for asking me to see Robinson Quiroz for CAD.    Coronary Artery Disease      This is a 72 y.o. male with:    1. Coronary artery disease involving native coronary artery of native heart without angina pectoris    2. Pure hypercholesterolemia    3. Essential hypertension          Chief complaint -Follow-up CAD        History of present Illness-72 y.o. year old gentleman with history of coronary disease status post quadruple bypass in 2004,   has coronary angiogram from 2012 which showed a patent LIMA to LAD, SVG to diagonal and is filling the LAD as well, SVG to circumflex looks to be occluded SVG to RPDA was patent.  He had significant disease in his native circumflex so underwent PCI of the mid circumflex and into and OM. had a abdominal aortic aneurysm screening last year and was okay.  He had a nuclear stress test in January 2017 which showed no ischemia EF was normal.  is on optimal medical therapy with beta blockers and angiotensin receptor blockers and his blood pressure is well controlled.  His BMI is high talked to him about diet and exercise.  Denies any GI symptoms will CNS symptoms.   He had one episode of chest pain several weeks ago, which is sharp in character lasted for 1 minute, was nonexertional and resolved on its own.  Is not recurred.  He is able to exercise on a treadmill for 30 minutes without any chest pain or shortness of breath.    12/30/2022:  Patient has been doing well until last month, had an episode of chest pain when he was swallowing a puzzle, it felt like a stabbing character was localized to substernal region radiated to his neck and right earache, lasted for a few minutes.  He had recurrence of pain in the middle of the night which woke him up and also lasted for a few minutes and felt like an ache.  Has  not been having any exertional chest pain though.  He was attributing his symptoms to reflux and did not seek medical attention.    10/16/2020:  No acute issues since last visit but denying any chest pain or shortness of breath.        Review of Systems - ROS  Constitution: Negative for weakness, weight gain and weight loss.   HENT: Negative for congestion.    Eyes: Negative for blurred vision.   Cardiovascular: As mentioned above  Respiratory: Negative for cough and hemoptysis.    Endocrine: Negative for polydipsia and polyuria.   Hematologic/Lymphatic: Negative for bleeding problem. Does not bruise/bleed easily.   Skin: Negative for flushing.   Musculoskeletal: Negative for neck pain and stiffness.   Gastrointestinal: Negative for abdominal pain, diarrhea, jaundice, melena, nausea and vomiting.   Genitourinary: Negative for dysuria and hematuria.   Neurological: Negative for dizziness, focal weakness and numbness.   Psychiatric/Behavioral: Negative for altered mental status and depression.     I reviewed the ROS as documented here and confirmed the accuracy of it with the patient today. 12/30/2022        All other systems were reviewed and were negative.    family history includes Alcohol abuse in his paternal uncle; Alzheimer's disease in his father; Arthritis in his maternal aunt, maternal grandfather, maternal grandmother, and maternal uncle; Cancer in his mother; Diabetes in his mother; Early death in his mother; Hearing loss in his daughter and mother; Heart disease in his father; Hyperlipidemia in his mother; Liver disease in his mother; Pancreatic cancer in his mother; Stroke in his father.     reports that he quit smoking about 19 years ago. His smoking use included cigarettes. He has never used smokeless tobacco. He reports that he does not drink alcohol and does not use drugs.    Allergies   Allergen Reactions   • Penicillins Anaphylaxis   • Adhesive Tape Other (See Comments)     Takes his skin off   •  "Latex Rash         Current Outpatient Medications:   •  aspirin 81 MG tablet, Take 1 tablet by mouth Daily., Disp: 30 tablet, Rfl: 11  •  atorvastatin (LIPITOR) 80 MG tablet, Take 1 tablet by mouth Daily., Disp: 90 tablet, Rfl: 1  •  b complex-C-folic acid 1 MG capsule, Take 1 capsule by mouth Daily., Disp: , Rfl:   •  losartan-hydrochlorothiazide (HYZAAR) 50-12.5 MG per tablet, Take 1 tablet by mouth Daily., Disp: 90 tablet, Rfl: 1  •  metoprolol succinate XL (TOPROL-XL) 50 MG 24 hr tablet, TAKE 1 TABLET BY MOUTH EVERY DAY, Disp: 90 tablet, Rfl: 1  •  Multiple Vitamins-Minerals (MULTIVITAMIN ADULTS 50+ PO), Take 1 tablet by mouth., Disp: , Rfl:   •  Omega-3 Fatty Acids (FISH OIL) 1000 MG capsule capsule, Take 1,200 mg by mouth Daily With Breakfast., Disp: , Rfl:   •  pregabalin (LYRICA) 50 MG capsule, TAKE 1 CAPSULE BY MOUTH TWICE A DAY, Disp: 180 capsule, Rfl: 1  •  tamsulosin (FLOMAX) 0.4 MG capsule 24 hr capsule, Take two daily, Disp: 180 capsule, Rfl: 1    Physical Exam:  Vitals:    12/30/22 0750   BP: 132/80   BP Location: Left arm   Patient Position: Sitting   Cuff Size: Adult   Pulse: 63   SpO2: 97%   Weight: 121 kg (266 lb)   Height: 190.5 cm (75\")   PainSc: 0-No pain     Current Pain Level: none  Pulse Ox: Normal  on room air  General: alert, appears stated age and cooperative     Body Habitus: well-nourished    HEENT: Head: Normocephalic, no lesions, without obvious abnormality. No arcus senilis, xanthelasma or xanthomas.    Neuro: alert, oriented x3  Pulses: 2+ and symmetric  JVP: Volume/Pulsation: Normal.  Normal waveforms.   Appropriate inspiratory decrease.  No Kussmaul's. No Geno's.   Carotid Exam: no bruit normal pulsation bilaterally   Carotid Volume: normal.     Respirations: no increased work of breathing   Chest:  Normal    Pulmonary:Normal   Precordium: Normal impulses. P2 is not palpable.  RV Heave: absent  LV Heave: absent  Carpio:  normal size and placement  Palpable S4: absent.  Heart " rate: normal    Heart Rhythm: regular     Heart Sounds: S1: normal  S2: normal  S3: absent   S4: absent  Opening Snap: absent    Pericardial Rub:  Absent: .    Abdomen:   Appearance: normal .  Palpation: Soft, non-tender to palpation, bowel sounds positive in all four quadrants; no guarding or rebound tenderness  Extremity: no edema.   LE Skin: no rashes  LE Hair:  normal  LE Pulses: well perfused with normal pulses in the distal extremities  Pallor on elevation: Absent. Rubor on dependency: None    I have reexamined the patient and the results are consistent with the previously documented exam. Manuel Alcaraz MD       DATA REVIEWED:     EKG. I personally reviewed and interpreted the EKG.  Sinus rhythm with first-degree AV block.     ECG/EMG Results (all)     None        ---------------------------------------------------  TTE/ASHLYN:  Results for orders placed in visit on 10/25/19    Adult Transthoracic Echo Complete W/ Cont if Necessary Per Protocol    Interpretation Summary  · Left ventricular wall thickness is consistent with borderline concentric hypertrophy.  · Left ventricular diastolic dysfunction (grade I) consistent with impaired relaxation.  · Left ventricular systolic function is normal.  · Estimated EF appears to be in the range of 56 - 60%.      --------------------------------------------------------------------------------------------------  LABS:     The CVD Risk score (SANJAY'Skylarino, et al., 2008) failed to calculate for the following reasons:    The patient has a prior MI, stroke, CHF, or peripheral vascular disease diagnosis         Lab Results   Component Value Date    GLUCOSE 96 09/12/2022    BUN 8 09/12/2022    CREATININE 0.77 09/12/2022    EGFRIFNONA 94 05/27/2021    BCR 10.4 09/12/2022    K 3.8 09/12/2022    CO2 25.2 09/12/2022    CALCIUM 9.1 09/12/2022    ALBUMIN 4.40 09/12/2022    AST 41 (H) 09/12/2022    ALT 51 (H) 09/12/2022     Lab Results   Component Value Date    WBC 5.81 09/12/2022    HGB  14.5 09/12/2022    HCT 41.6 09/12/2022    MCV 88.3 09/12/2022     (L) 09/12/2022     Lab Results   Component Value Date    CHOL 102 09/12/2022    CHLPL 123 01/29/2016    TRIG 154 (H) 09/12/2022    HDL 42 09/12/2022    LDL 34 09/12/2022     Lab Results   Component Value Date    TSH 2.890 08/02/2018     Lab Results   Component Value Date    CKTOTAL 190 (H) 08/02/2018     Lab Results   Component Value Date    HGBA1C 5.6 01/29/2016     No results found for: DDIMER  Lab Results   Component Value Date    ALT 51 (H) 09/12/2022     Lab Results   Component Value Date    HGBA1C 5.6 01/29/2016     Lab Results   Component Value Date    CREATININE 0.77 09/12/2022     Lab Results   Component Value Date    IRON 106 07/08/2022    TIBC 361 07/08/2022    FERRITIN 212.00 07/08/2022     Lab Results   Component Value Date    INR 0.99 07/08/2022    PROTIME 12.9 07/08/2022        Interpretation Summary    · Left ventricular wall thickness is consistent with borderline concentric hypertrophy.  · Left ventricular diastolic dysfunction (grade I) consistent with impaired relaxation.  · Left ventricular systolic function is normal.  · Estimated EF appears to be in the range of 56 - 60%.         Assessment/Plan   CAD status post CABG-history of stent placement to circumflex proximal artery in 2013     Now that he is having intermittent chest pain we will plan on a 2-day nuclear stress test to assess for progression of disease.  I explained the risk, benefits, alternatives limitation of nuclear stress test with the patient and patient is agreeable.  We will add Ranexa, continue aspirin/statin and metoprolol XL.  We will get an echocardiogram.  We will add a PPI  As needed nitroglycerin.  Patient was advised to seek medical attention if significant chest pain unresolving.     Hypertension continue the Toprol in the evening and Hyzaar in the morning.     Hyperlipidemia on atorvastatin doing okay.  And he was taken a regular aspirin every  day.  LDL was less than 50 in 09/2022         Prevention:  Patient's Body mass index is 33.25 kg/m². BMI is above normal parameters. Recommendations include: exercise counseling and nutrition counseling.      Robinson Quiroz is not a smoker    AAA Screening:      IMPRESSION: 05/2017  CONCLUSION:  No abdominal aortic aneurysm.          This document has been electronically signed by Manuel Alcaraz MD on December 30, 2022 08:14 CST

## 2023-01-06 LAB
QT INTERVAL: 474 MS
QTC INTERVAL: 485 MS

## 2023-01-06 RX ORDER — RANOLAZINE 500 MG/1
500 TABLET, EXTENDED RELEASE ORAL 2 TIMES DAILY
Qty: 60 TABLET | Refills: 3 | Status: SHIPPED | OUTPATIENT
Start: 2023-01-06 | End: 2023-04-05 | Stop reason: SDUPTHER

## 2023-01-10 RX ORDER — PANTOPRAZOLE SODIUM 40 MG/1
40 TABLET, DELAYED RELEASE ORAL DAILY
Qty: 90 TABLET | Refills: 1 | Status: SHIPPED | OUTPATIENT
Start: 2023-01-10

## 2023-01-17 ENCOUNTER — HOSPITAL ENCOUNTER (OUTPATIENT)
Dept: NUCLEAR MEDICINE | Facility: HOSPITAL | Age: 73
Discharge: HOME | End: 2023-01-17
Payer: MEDICARE

## 2023-01-17 PROCEDURE — 78452 HT MUSCLE IMAGE SPECT MULT: CPT | Performed by: INTERNAL MEDICINE

## 2023-01-17 PROCEDURE — A9500 TC99M SESTAMIBI: HCPCS | Performed by: INTERNAL MEDICINE

## 2023-01-17 PROCEDURE — 78452 HT MUSCLE IMAGE SPECT MULT: CPT

## 2023-01-17 PROCEDURE — 0 TECHNETIUM SESTAMIBI: Performed by: INTERNAL MEDICINE

## 2023-01-17 PROCEDURE — 93018 CV STRESS TEST I&R ONLY: CPT | Performed by: INTERNAL MEDICINE

## 2023-01-17 PROCEDURE — 93017 CV STRESS TEST TRACING ONLY: CPT

## 2023-01-17 RX ADMIN — TECHNETIUM TC 99M SESTAMIBI 1 DOSE: 1 INJECTION INTRAVENOUS at 07:23

## 2023-01-18 ENCOUNTER — HOSPITAL ENCOUNTER (OUTPATIENT)
Dept: NUCLEAR MEDICINE | Facility: HOSPITAL | Age: 73
Discharge: HOME | End: 2023-01-18
Payer: MEDICARE

## 2023-01-18 ENCOUNTER — HOSPITAL ENCOUNTER (OUTPATIENT)
Dept: CARDIOLOGY | Facility: HOSPITAL | Age: 73
Discharge: HOME | End: 2023-01-18
Payer: MEDICARE

## 2023-01-18 LAB
BH CV REST NUCLEAR ISOTOPE DOSE: 34.9 MCI
BH CV STRESS BP STAGE 1: NORMAL
BH CV STRESS BP STAGE 2: NORMAL
BH CV STRESS DURATION MIN STAGE 1: 3
BH CV STRESS DURATION MIN STAGE 2: 1
BH CV STRESS DURATION SEC STAGE 1: 0
BH CV STRESS DURATION SEC STAGE 2: 45
BH CV STRESS GRADE STAGE 1: 10
BH CV STRESS GRADE STAGE 2: 12
BH CV STRESS HR STAGE 1: 108
BH CV STRESS HR STAGE 2: 133
BH CV STRESS METS STAGE 1: 5
BH CV STRESS METS STAGE 2: 7.5
BH CV STRESS NUCLEAR ISOTOPE DOSE: 37.1 MCI
BH CV STRESS PROTOCOL 1: NORMAL
BH CV STRESS RECOVERY BP: NORMAL MMHG
BH CV STRESS RECOVERY HR: 74 BPM
BH CV STRESS SPEED STAGE 1: 1.7
BH CV STRESS SPEED STAGE 2: 2.5
BH CV STRESS STAGE 1: 1
BH CV STRESS STAGE 2: 2
LV EF NUC BP: 65 %
MAXIMAL PREDICTED HEART RATE: 149 BPM
PERCENT MAX PREDICTED HR: 89.26 %
STRESS BASELINE BP: NORMAL MMHG
STRESS BASELINE HR: 63 BPM
STRESS PERCENT HR: 105 %
STRESS POST ESTIMATED WORKLOAD: 7 METS
STRESS POST EXERCISE DUR MIN: 4 MIN
STRESS POST EXERCISE DUR SEC: 44 SEC
STRESS POST PEAK BP: NORMAL MMHG
STRESS POST PEAK HR: 133 BPM
STRESS TARGET HR: 127 BPM

## 2023-01-18 PROCEDURE — A9500 TC99M SESTAMIBI: HCPCS | Performed by: INTERNAL MEDICINE

## 2023-01-18 PROCEDURE — 0 TECHNETIUM SESTAMIBI: Performed by: INTERNAL MEDICINE

## 2023-01-18 RX ADMIN — TECHNETIUM TC 99M SESTAMIBI 1 DOSE: 1 INJECTION INTRAVENOUS at 08:12

## 2023-01-19 NOTE — PROGRESS NOTES
Discussed results of stress test with the patient.  It was intermediate risk for ischemia.  It appears it has chest pain has resolved after adding Ranexa.  He wanted to hold off on cardiac cath at this point.  We will reassess him in office and if recurrent chest pain will consider cardiac cath for further evaluation.

## 2023-02-08 ENCOUNTER — TELEPHONE (OUTPATIENT)
Dept: FAMILY MEDICINE CLINIC | Facility: CLINIC | Age: 73
End: 2023-02-08
Payer: MEDICARE

## 2023-02-08 DIAGNOSIS — M79.2 NEUROPATHIC PAIN: Primary | ICD-10-CM

## 2023-02-08 RX ORDER — PREGABALIN 75 MG/1
75 CAPSULE ORAL 2 TIMES DAILY
Qty: 180 CAPSULE | Refills: 1 | Status: SHIPPED | OUTPATIENT
Start: 2023-02-08

## 2023-02-08 NOTE — TELEPHONE ENCOUNTER
Pt is experiencing stabbing pain in his feet. Says he has not been diagnosed with diabetes and takes Lyrica daily. Pt would like a call back to discuss possible causes and see what he needs to do next. Thanks!

## 2023-02-09 RX ORDER — METOPROLOL SUCCINATE 50 MG/1
TABLET, EXTENDED RELEASE ORAL
Qty: 90 TABLET | Refills: 1 | Status: SHIPPED | OUTPATIENT
Start: 2023-02-09

## 2023-03-13 ENCOUNTER — OFFICE VISIT (OUTPATIENT)
Dept: FAMILY MEDICINE CLINIC | Facility: CLINIC | Age: 73
End: 2023-03-13
Payer: MEDICARE

## 2023-03-13 ENCOUNTER — LAB (OUTPATIENT)
Dept: LAB | Facility: HOSPITAL | Age: 73
End: 2023-03-13
Payer: MEDICARE

## 2023-03-13 VITALS
OXYGEN SATURATION: 95 % | HEIGHT: 75 IN | DIASTOLIC BLOOD PRESSURE: 60 MMHG | SYSTOLIC BLOOD PRESSURE: 122 MMHG | WEIGHT: 259.44 LBS | BODY MASS INDEX: 32.26 KG/M2 | HEART RATE: 69 BPM

## 2023-03-13 DIAGNOSIS — I10 ESSENTIAL HYPERTENSION: Primary | ICD-10-CM

## 2023-03-13 DIAGNOSIS — R79.9 ABNORMAL FINDING OF BLOOD CHEMISTRY, UNSPECIFIED: ICD-10-CM

## 2023-03-13 DIAGNOSIS — I10 ESSENTIAL HYPERTENSION: ICD-10-CM

## 2023-03-13 DIAGNOSIS — E78.00 PURE HYPERCHOLESTEROLEMIA: ICD-10-CM

## 2023-03-13 DIAGNOSIS — Z13.1 SCREENING FOR DIABETES MELLITUS (DM): ICD-10-CM

## 2023-03-13 DIAGNOSIS — M54.50 MIDLINE LOW BACK PAIN, UNSPECIFIED CHRONICITY, UNSPECIFIED WHETHER SCIATICA PRESENT: ICD-10-CM

## 2023-03-13 LAB
ALBUMIN SERPL-MCNC: 4.5 G/DL (ref 3.5–5.2)
ALBUMIN/GLOB SERPL: 1.7 G/DL
ALP SERPL-CCNC: 85 U/L (ref 39–117)
ALT SERPL W P-5'-P-CCNC: 34 U/L (ref 1–41)
ANION GAP SERPL CALCULATED.3IONS-SCNC: 11.4 MMOL/L (ref 5–15)
AST SERPL-CCNC: 32 U/L (ref 1–40)
BASOPHILS # BLD AUTO: 0.05 10*3/MM3 (ref 0–0.2)
BASOPHILS NFR BLD AUTO: 0.7 % (ref 0–1.5)
BILIRUB SERPL-MCNC: 0.8 MG/DL (ref 0–1.2)
BUN SERPL-MCNC: 10 MG/DL (ref 8–23)
BUN/CREAT SERPL: 11.8 (ref 7–25)
CALCIUM SPEC-SCNC: 9.9 MG/DL (ref 8.6–10.5)
CHLORIDE SERPL-SCNC: 98 MMOL/L (ref 98–107)
CHOLEST SERPL-MCNC: 118 MG/DL (ref 0–200)
CO2 SERPL-SCNC: 27.6 MMOL/L (ref 22–29)
CREAT SERPL-MCNC: 0.85 MG/DL (ref 0.76–1.27)
DEPRECATED RDW RBC AUTO: 42 FL (ref 37–54)
EGFRCR SERPLBLD CKD-EPI 2021: 92.3 ML/MIN/1.73
EOSINOPHIL # BLD AUTO: 0.25 10*3/MM3 (ref 0–0.4)
EOSINOPHIL NFR BLD AUTO: 3.5 % (ref 0.3–6.2)
ERYTHROCYTE [DISTWIDTH] IN BLOOD BY AUTOMATED COUNT: 13 % (ref 12.3–15.4)
GLOBULIN UR ELPH-MCNC: 2.6 GM/DL
GLUCOSE SERPL-MCNC: 96 MG/DL (ref 65–99)
HBA1C MFR BLD: 5.5 % (ref 4.8–5.6)
HCT VFR BLD AUTO: 41.8 % (ref 37.5–51)
HDLC SERPL-MCNC: 51 MG/DL (ref 40–60)
HGB BLD-MCNC: 14.9 G/DL (ref 13–17.7)
IMM GRANULOCYTES # BLD AUTO: 0.06 10*3/MM3 (ref 0–0.05)
IMM GRANULOCYTES NFR BLD AUTO: 0.8 % (ref 0–0.5)
LDLC SERPL CALC-MCNC: 42 MG/DL (ref 0–100)
LDLC/HDLC SERPL: 0.73 {RATIO}
LYMPHOCYTES # BLD AUTO: 1.88 10*3/MM3 (ref 0.7–3.1)
LYMPHOCYTES NFR BLD AUTO: 26.6 % (ref 19.6–45.3)
MCH RBC QN AUTO: 31.5 PG (ref 26.6–33)
MCHC RBC AUTO-ENTMCNC: 35.6 G/DL (ref 31.5–35.7)
MCV RBC AUTO: 88.4 FL (ref 79–97)
MONOCYTES # BLD AUTO: 0.73 10*3/MM3 (ref 0.1–0.9)
MONOCYTES NFR BLD AUTO: 10.3 % (ref 5–12)
NEUTROPHILS NFR BLD AUTO: 4.1 10*3/MM3 (ref 1.7–7)
NEUTROPHILS NFR BLD AUTO: 58.1 % (ref 42.7–76)
NRBC BLD AUTO-RTO: 0 /100 WBC (ref 0–0.2)
PLATELET # BLD AUTO: 171 10*3/MM3 (ref 140–450)
PMV BLD AUTO: 10.5 FL (ref 6–12)
POTASSIUM SERPL-SCNC: 3.8 MMOL/L (ref 3.5–5.2)
PROT SERPL-MCNC: 7.1 G/DL (ref 6–8.5)
RBC # BLD AUTO: 4.73 10*6/MM3 (ref 4.14–5.8)
SODIUM SERPL-SCNC: 137 MMOL/L (ref 136–145)
TRIGL SERPL-MCNC: 148 MG/DL (ref 0–150)
VLDLC SERPL-MCNC: 25 MG/DL (ref 5–40)
WBC NRBC COR # BLD: 7.07 10*3/MM3 (ref 3.4–10.8)

## 2023-03-13 PROCEDURE — 85025 COMPLETE CBC W/AUTO DIFF WBC: CPT

## 2023-03-13 PROCEDURE — 80061 LIPID PANEL: CPT

## 2023-03-13 PROCEDURE — 99214 OFFICE O/P EST MOD 30 MIN: CPT | Performed by: FAMILY MEDICINE

## 2023-03-13 PROCEDURE — 96372 THER/PROPH/DIAG INJ SC/IM: CPT | Performed by: FAMILY MEDICINE

## 2023-03-13 PROCEDURE — 83036 HEMOGLOBIN GLYCOSYLATED A1C: CPT

## 2023-03-13 PROCEDURE — 80053 COMPREHEN METABOLIC PANEL: CPT

## 2023-03-13 PROCEDURE — 36415 COLL VENOUS BLD VENIPUNCTURE: CPT

## 2023-03-13 RX ORDER — TAMSULOSIN HYDROCHLORIDE 0.4 MG/1
CAPSULE ORAL
Qty: 180 CAPSULE | Refills: 1 | Status: CANCELLED | OUTPATIENT
Start: 2023-03-13

## 2023-03-13 RX ORDER — ATORVASTATIN CALCIUM 80 MG/1
80 TABLET, FILM COATED ORAL DAILY
Qty: 90 TABLET | Refills: 1 | Status: CANCELLED | OUTPATIENT
Start: 2023-03-13

## 2023-03-13 RX ORDER — TRIAMCINOLONE ACETONIDE 40 MG/ML
80 INJECTION, SUSPENSION INTRA-ARTICULAR; INTRAMUSCULAR ONCE
Status: COMPLETED | OUTPATIENT
Start: 2023-03-13 | End: 2023-03-13

## 2023-03-13 RX ORDER — LOSARTAN POTASSIUM AND HYDROCHLOROTHIAZIDE 12.5; 5 MG/1; MG/1
1 TABLET ORAL DAILY
Qty: 90 TABLET | Refills: 1 | Status: CANCELLED | OUTPATIENT
Start: 2023-03-13

## 2023-03-13 RX ADMIN — TRIAMCINOLONE ACETONIDE 80 MG: 40 INJECTION, SUSPENSION INTRA-ARTICULAR; INTRAMUSCULAR at 09:33

## 2023-03-13 NOTE — PROGRESS NOTES
Subjective   Robinson Quiroz is a 73 y.o. male.   Cc: follow up of chronic medical issues    Hyperlipidemia  This is a chronic problem. The current episode started more than 1 year ago. The problem is resistant. Pertinent negatives include no chest pain, leg pain, myalgias or shortness of breath. Current antihyperlipidemic treatment includes statins.   Hypertension  This is a chronic problem. The current episode started more than 1 year ago. The problem has been gradually improving since onset. Pertinent negatives include no anxiety, blurred vision, chest pain, headaches, malaise/fatigue, neck pain, orthopnea, palpitations, peripheral edema, PND, shortness of breath or sweats. Current antihypertension treatment includes angiotensin blockers, diuretics and beta blockers.   Peripheral Neuropathy  This is a chronic problem. The current episode started more than 1 year ago. The problem occurs daily. Pertinent negatives include no abdominal pain, anorexia, arthralgias, change in bowel habit, chest pain, chills, congestion, coughing, diaphoresis, fatigue, fever, headaches, joint swelling, myalgias, nausea, neck pain, numbness, rash, sore throat, swollen glands, urinary symptoms, vertigo, visual change, vomiting or weakness.   Back Pain  This is a chronic problem. The current episode started more than 1 year ago. The problem occurs daily. The pain is present in the lumbar spine. The pain is at a severity of 8/10. The pain is moderate. The pain is worse during the day. The symptoms are aggravated by bending and standing. Pertinent negatives include no abdominal pain, bladder incontinence, bowel incontinence, chest pain, dysuria, fever, headaches, leg pain, numbness, weakness or weight loss.       The following portions of the patient's history were reviewed and updated as appropriate: allergies, current medications, past family history, past medical history, past social history, past surgical history and problem  list.    Review of Systems   Constitutional: Negative for chills, diaphoresis, fatigue, fever, malaise/fatigue and weight loss.   HENT: Negative for congestion and sore throat.    Eyes: Negative for blurred vision.   Respiratory: Negative for cough and shortness of breath.    Cardiovascular: Negative for chest pain, palpitations, orthopnea and PND.   Gastrointestinal: Negative for abdominal pain, anorexia, bowel incontinence, change in bowel habit, nausea and vomiting.   Genitourinary: Negative for bladder incontinence and dysuria.   Musculoskeletal: Positive for back pain. Negative for arthralgias, joint swelling, myalgias and neck pain.   Skin: Negative for rash.   Neurological: Negative for vertigo, weakness, numbness and headaches.       Objective   Physical Exam  Vitals reviewed.   Constitutional:       Appearance: Normal appearance.   HENT:      Head: Normocephalic and atraumatic.      Right Ear: Tympanic membrane, ear canal and external ear normal.      Left Ear: Tympanic membrane, ear canal and external ear normal.      Nose: Nose normal.      Mouth/Throat:      Mouth: Mucous membranes are moist.      Pharynx: Oropharynx is clear.   Cardiovascular:      Rate and Rhythm: Normal rate and regular rhythm.      Heart sounds: Normal heart sounds. No murmur heard.    No friction rub. No gallop.   Pulmonary:      Effort: Pulmonary effort is normal. No respiratory distress.      Breath sounds: Normal breath sounds. No stridor. No wheezing, rhonchi or rales.   Chest:      Chest wall: No tenderness.   Abdominal:      General: Bowel sounds are normal. There is no distension.      Palpations: Abdomen is soft. There is no mass.      Tenderness: There is no abdominal tenderness. There is no guarding or rebound.      Hernia: No hernia is present.   Musculoskeletal:      Cervical back: Normal range of motion and neck supple.      Comments: Tender in the low back on palpation.   Skin:     General: Skin is warm and dry.  "  Neurological:      Mental Status: He is alert.           Visit Vitals  /60   Pulse 69   Ht 190.5 cm (75\")   Wt 118 kg (259 lb 7 oz)   SpO2 95%   BMI 32.43 kg/m²     Body mass index is 32.43 kg/m².      Assessment/Plan   Diagnoses and all orders for this visit:    1. Essential hypertension (Primary)  -     Comprehensive metabolic panel; Future  -     CBC w AUTO Differential; Future    2. Pure hypercholesterolemia  -     Lipid panel; Future    3. Midline low back pain, unspecified chronicity, unspecified whether sciatica present  -     triamcinolone acetonide (KENALOG-40) injection 80 mg    I reviewed the CBC,CMP,and Lipid Profile with the patient.  Return to the clinic in 3 month/s.  Will contact with results as needed.          This document has been electronically signed by Merrick Gorman MD on March 13, 2023 09:07 CDT    "

## 2023-03-21 LAB
BH CV ECHO MEAS - ACS: 1.8 CM
BH CV ECHO MEAS - AO MAX PG: 14.3 MMHG
BH CV ECHO MEAS - AO MEAN PG: 6.7 MMHG
BH CV ECHO MEAS - AO ROOT DIAM: 3.4 CM
BH CV ECHO MEAS - AO V2 MAX: 189.1 CM/SEC
BH CV ECHO MEAS - AO V2 VTI: 35.3 CM
BH CV ECHO MEAS - AVA(I,D): 2.05 CM2
BH CV ECHO MEAS - EDV(CUBED): 204 ML
BH CV ECHO MEAS - EDV(MOD-SP2): 98.8 ML
BH CV ECHO MEAS - EDV(MOD-SP4): 106 ML
BH CV ECHO MEAS - EF(MOD-BP): 58.5 %
BH CV ECHO MEAS - EF(MOD-SP2): 57.7 %
BH CV ECHO MEAS - EF(MOD-SP4): 61 %
BH CV ECHO MEAS - ESV(CUBED): 73 ML
BH CV ECHO MEAS - ESV(MOD-SP2): 41.8 ML
BH CV ECHO MEAS - ESV(MOD-SP4): 41.3 ML
BH CV ECHO MEAS - FS: 29 %
BH CV ECHO MEAS - IVS/LVPW: 1.15 CM
BH CV ECHO MEAS - IVSD: 1.18 CM
BH CV ECHO MEAS - LA DIMENSION: 4.6 CM
BH CV ECHO MEAS - LAT PEAK E' VEL: 10.7 CM/SEC
BH CV ECHO MEAS - LV MASS(C)D: 273.3 GRAMS
BH CV ECHO MEAS - LV MAX PG: 3.5 MMHG
BH CV ECHO MEAS - LV MEAN PG: 2.4 MMHG
BH CV ECHO MEAS - LV V1 MAX: 93.8 CM/SEC
BH CV ECHO MEAS - LV V1 VTI: 23.8 CM
BH CV ECHO MEAS - LVIDD: 5.9 CM
BH CV ECHO MEAS - LVIDS: 4.2 CM
BH CV ECHO MEAS - LVOT AREA: 3 CM2
BH CV ECHO MEAS - LVOT DIAM: 1.97 CM
BH CV ECHO MEAS - LVPWD: 1.03 CM
BH CV ECHO MEAS - MED PEAK E' VEL: 5.4 CM/SEC
BH CV ECHO MEAS - MV A MAX VEL: 99.6 CM/SEC
BH CV ECHO MEAS - MV DEC SLOPE: 135 CM/SEC2
BH CV ECHO MEAS - MV DEC TIME: 0.39 MSEC
BH CV ECHO MEAS - MV E MAX VEL: 52.4 CM/SEC
BH CV ECHO MEAS - MV E/A: 0.53
BH CV ECHO MEAS - MV MAX PG: 5.2 MMHG
BH CV ECHO MEAS - MV MEAN PG: 1.55 MMHG
BH CV ECHO MEAS - MV V2 VTI: 34.4 CM
BH CV ECHO MEAS - MVA(VTI): 2.1 CM2
BH CV ECHO MEAS - PA V2 MAX: 138.6 CM/SEC
BH CV ECHO MEAS - RAP SYSTOLE: 3 MMHG
BH CV ECHO MEAS - RV MAX PG: 4.1 MMHG
BH CV ECHO MEAS - RV V1 MAX: 101.1 CM/SEC
BH CV ECHO MEAS - RV V1 VTI: 25.1 CM
BH CV ECHO MEAS - RVDD: 3.8 CM
BH CV ECHO MEAS - RVSP: 16.1 MMHG
BH CV ECHO MEAS - SV(LVOT): 72.3 ML
BH CV ECHO MEAS - SV(MOD-SP2): 57 ML
BH CV ECHO MEAS - SV(MOD-SP4): 64.7 ML
BH CV ECHO MEAS - TAPSE (>1.6): 1.76 CM
BH CV ECHO MEAS - TR MAX PG: 13.1 MMHG
BH CV ECHO MEAS - TR MAX VEL: 181.1 CM/SEC
BH CV ECHO MEASUREMENTS AVERAGE E/E' RATIO: 6.51
BH CV XLRA - RV BASE: 4.4 CM
BH CV XLRA - RV MID: 3.8 CM
MAXIMAL PREDICTED HEART RATE: 148 BPM
SINUS: 2.9 CM
STRESS TARGET HR: 126 BPM

## 2023-03-24 ENCOUNTER — TELEPHONE (OUTPATIENT)
Dept: CARDIOLOGY | Facility: CLINIC | Age: 73
End: 2023-03-24
Payer: MEDICARE

## 2023-03-24 NOTE — TELEPHONE ENCOUNTER
----- Message from Manuel Alcaraz MD sent at 3/21/2023  4:50 PM CDT -----    ----- Message -----  From: Manuel Alcaraz MD  Sent: 3/21/2023   4:50 PM CDT  To: Manuel Alcaraz MD    Reading Physician Reading Date Result Priority   Manuel Alcaraz MD  [phone] 3/21/2023 Routine     Result Text     •  Left ventricular systolic function is normal. Left ventricular ejection fraction appears to be 56 - 60%.  •  Left ventricular diastolic function is consistent with (grade I) impaired relaxation.  •  Estimated right ventricular systolic pressure from tricuspid regurgitation is normal (<35 mmHg).     Results of echo discussed with the patient per dr. Alcaraz.

## 2023-03-30 ENCOUNTER — OFFICE VISIT (OUTPATIENT)
Dept: CARDIOLOGY | Facility: CLINIC | Age: 73
End: 2023-03-30
Payer: MEDICARE

## 2023-03-30 VITALS
BODY MASS INDEX: 32.33 KG/M2 | DIASTOLIC BLOOD PRESSURE: 80 MMHG | HEART RATE: 82 BPM | HEIGHT: 75 IN | SYSTOLIC BLOOD PRESSURE: 146 MMHG | OXYGEN SATURATION: 95 % | WEIGHT: 260 LBS

## 2023-03-30 DIAGNOSIS — I25.83 CORONARY ARTERY DISEASE DUE TO LIPID RICH PLAQUE: ICD-10-CM

## 2023-03-30 DIAGNOSIS — I10 HYPERTENSION, ESSENTIAL: Primary | ICD-10-CM

## 2023-03-30 DIAGNOSIS — I25.10 CORONARY ARTERY DISEASE DUE TO LIPID RICH PLAQUE: ICD-10-CM

## 2023-03-30 DIAGNOSIS — E78.2 HYPERLIPEMIA, MIXED: ICD-10-CM

## 2023-03-30 NOTE — PROGRESS NOTES
[facility]  OFFICE NOTE    Cardiovascular Medicine  Manuel Alcaraz M.D., RPVI         No referring provider defined for this encounter.    Thank you for asking me to see Robinson Kahlil Gabriella for CAD.    This is a 73 y.o. male with:    1. Coronary artery disease involving native coronary artery of native heart without angina pectoris    2. Pure hypercholesterolemia    3. Essential hypertension          Chief complaint -Follow-up CAD        History of present Illness-73 y.o. year old gentleman with history of coronary disease status post quadruple bypass in 2004,   has coronary angiogram from 2012 which showed a patent LIMA to LAD, SVG to diagonal and is filling the LAD as well, SVG to circumflex looks to be occluded SVG to RPDA was patent.  He had significant disease in his native circumflex so underwent PCI of the mid circumflex and into and OM. had a abdominal aortic aneurysm screening last year and was okay.  He had a nuclear stress test in January 2017 which showed no ischemia EF was normal.  is on optimal medical therapy with beta blockers and angiotensin receptor blockers and his blood pressure is well controlled.  His BMI is high talked to him about diet and exercise.  Denies any GI symptoms will CNS symptoms.   He had one episode of chest pain several weeks ago, which is sharp in character lasted for 1 minute, was nonexertional and resolved on its own.  Is not recurred.  He is able to exercise on a treadmill for 30 minutes without any chest pain or shortness of breath.    03/30/2023:  Patient was complaining of chest pain since last visit, underwent a nuclear stress test which showed a medium size inferolateral wall with mel-infarct ischemia and ischemia in the apex.  Echocardiogram for preserved LV systolic function.  His symptoms resolved after adding Ranexa and he wanted to hold off on cardiac cath.  Did not have any further episodes of chest pain.      12/30/2022:  Patient has  been doing well until last month, had an episode of chest pain when he was swallowing a puzzle, it felt like a stabbing character was localized to substernal region radiated to his neck and right earache, lasted for a few minutes.  He had recurrence of pain in the middle of the night which woke him up and also lasted for a few minutes and felt like an ache.  Has not been having any exertional chest pain though.  He was attributing his symptoms to reflux and did not seek medical attention.    10/16/2020:  No acute issues since last visit but denying any chest pain or shortness of breath.        Review of Systems - ROS  Constitution: Negative for weakness, weight gain and weight loss.   HENT: Negative for congestion.    Eyes: Negative for blurred vision.   Cardiovascular: As mentioned above  Respiratory: Negative for cough and hemoptysis.    Endocrine: Negative for polydipsia and polyuria.   Hematologic/Lymphatic: Negative for bleeding problem. Does not bruise/bleed easily.   Skin: Negative for flushing.   Musculoskeletal: Negative for neck pain and stiffness.   Gastrointestinal: Negative for abdominal pain, diarrhea, jaundice, melena, nausea and vomiting.   Genitourinary: Negative for dysuria and hematuria.   Neurological: Negative for dizziness, focal weakness and numbness.   Psychiatric/Behavioral: Negative for altered mental status and depression.     I reviewed the ROS as documented here and confirmed the accuracy of it with the patient today. 3/30/2023        All other systems were reviewed and were negative.    family history includes Alcohol abuse in his paternal uncle; Alzheimer's disease in his father; Arthritis in his maternal aunt, maternal grandfather, maternal grandmother, and maternal uncle; Cancer in his mother; Diabetes in his mother; Early death in his mother; Hearing loss in his daughter and mother; Heart disease in his father; Hyperlipidemia in his mother; Liver disease in his mother; Pancreatic  "cancer in his mother; Stroke in his father.     reports that he quit smoking about 19 years ago. His smoking use included cigarettes. He has never used smokeless tobacco. He reports that he does not drink alcohol and does not use drugs.    Allergies   Allergen Reactions   • Penicillins Anaphylaxis   • Adhesive Tape Other (See Comments)     Takes his skin off   • Latex Rash         Current Outpatient Medications:   •  aspirin 81 MG tablet, Take 1 tablet by mouth Daily., Disp: 30 tablet, Rfl: 11  •  atorvastatin (LIPITOR) 80 MG tablet, Take 1 tablet by mouth Daily., Disp: 90 tablet, Rfl: 1  •  b complex-C-folic acid 1 MG capsule, Take 1 capsule by mouth Daily., Disp: , Rfl:   •  losartan-hydrochlorothiazide (HYZAAR) 50-12.5 MG per tablet, Take 1 tablet by mouth Daily., Disp: 90 tablet, Rfl: 1  •  metoprolol succinate XL (TOPROL-XL) 50 MG 24 hr tablet, TAKE 1 TABLET BY MOUTH EVERY DAY, Disp: 90 tablet, Rfl: 1  •  Multiple Vitamins-Minerals (MULTIVITAMIN ADULTS 50+ PO), Take 1 tablet by mouth., Disp: , Rfl:   •  nitroglycerin (NITROSTAT) 0.3 MG SL tablet, 1 under the tongue as needed for angina, may repeat q5mins for up three doses, Disp: 100 tablet, Rfl: 11  •  Omega-3 Fatty Acids (FISH OIL) 1000 MG capsule capsule, Take 1,200 mg by mouth Daily With Breakfast., Disp: , Rfl:   •  pregabalin (Lyrica) 75 MG capsule, Take 1 capsule by mouth 2 (Two) Times a Day., Disp: 180 capsule, Rfl: 1  •  ranolazine (RANEXA) 500 MG 12 hr tablet, TAKE 1 TABLET BY MOUTH 2 (TWO) TIMES A DAY., Disp: 60 tablet, Rfl: 3  •  tamsulosin (FLOMAX) 0.4 MG capsule 24 hr capsule, Take two daily, Disp: 180 capsule, Rfl: 1  •  pantoprazole (PROTONIX) 40 MG EC tablet, TAKE 1 TABLET BY MOUTH DAILY., Disp: 90 tablet, Rfl: 1    Physical Exam:  Vitals:    03/30/23 0816   BP: 146/80   BP Location: Left arm   Patient Position: Sitting   Cuff Size: Adult   Pulse: 82   SpO2: 95%   Weight: 118 kg (260 lb)   Height: 190.5 cm (75\")   PainSc: 0-No pain   PainLoc: " Chest     Current Pain Level: none  Pulse Ox: Normal  on room air  General: alert, appears stated age and cooperative     Body Habitus: well-nourished    HEENT: Head: Normocephalic, no lesions, without obvious abnormality. No arcus senilis, xanthelasma or xanthomas.    Neuro: alert, oriented x3  Pulses: 2+ and symmetric  JVP: Volume/Pulsation: Normal.  Normal waveforms.   Appropriate inspiratory decrease.  No Kussmaul's. No Geno's.   Carotid Exam: no bruit normal pulsation bilaterally   Carotid Volume: normal.     Respirations: no increased work of breathing   Chest:  Normal    Pulmonary:Normal   Precordium: Normal impulses. P2 is not palpable.  RV Heave: absent  LV Heave: absent  Sanderson:  normal size and placement  Palpable S4: absent.  Heart rate: normal    Heart Rhythm: regular     Heart Sounds: S1: normal  S2: normal  S3: absent   S4: absent  Opening Snap: absent    Pericardial Rub:  Absent: .    Abdomen:   Appearance: normal .  Palpation: Soft, non-tender to palpation, bowel sounds positive in all four quadrants; no guarding or rebound tenderness  Extremity: no edema.   LE Skin: no rashes  LE Hair:  normal  LE Pulses: well perfused with normal pulses in the distal extremities  Pallor on elevation: Absent. Rubor on dependency: None    I have reexamined the patient and the results are consistent with the previously documented exam. Manuel Alcaraz MD       DATA REVIEWED:     EKG. I personally reviewed and interpreted the EKG.  Sinus rhythm with first-degree AV block.     ECG/EMG Results (all)     None        ---------------------------------------------------  TTE/ASHLYN:  Results for orders placed in visit on 12/30/22    Adult Transthoracic Echo Complete W/ Cont if Necessary Per Protocol    Interpretation Summary  •  Left ventricular systolic function is normal. Left ventricular ejection fraction appears to be 56 - 60%.  •  Left ventricular diastolic function is consistent with (grade I) impaired relaxation.  •   Estimated right ventricular systolic pressure from tricuspid regurgitation is normal (<35 mmHg).      --------------------------------------------------------------------------------------------------  LABS:     The CVD Risk score (Ela et al., 2008) failed to calculate for the following reasons:    The patient has a prior MI, stroke, CHF, or peripheral vascular disease diagnosis         Lab Results   Component Value Date    GLUCOSE 96 03/13/2023    BUN 10 03/13/2023    CREATININE 0.85 03/13/2023    EGFRIFNONA 94 05/27/2021    BCR 11.8 03/13/2023    K 3.8 03/13/2023    CO2 27.6 03/13/2023    CALCIUM 9.9 03/13/2023    ALBUMIN 4.5 03/13/2023    AST 32 03/13/2023    ALT 34 03/13/2023     Lab Results   Component Value Date    WBC 7.07 03/13/2023    HGB 14.9 03/13/2023    HCT 41.8 03/13/2023    MCV 88.4 03/13/2023     03/13/2023     Lab Results   Component Value Date    CHOL 118 03/13/2023    CHLPL 123 01/29/2016    TRIG 148 03/13/2023    HDL 51 03/13/2023    LDL 42 03/13/2023     Lab Results   Component Value Date    TSH 2.890 08/02/2018     Lab Results   Component Value Date    CKTOTAL 190 (H) 08/02/2018     Lab Results   Component Value Date    HGBA1C 5.50 03/13/2023     No results found for: DDIMER  Lab Results   Component Value Date    ALT 34 03/13/2023     Lab Results   Component Value Date    HGBA1C 5.50 03/13/2023    HGBA1C 5.6 01/29/2016     Lab Results   Component Value Date    CREATININE 0.85 03/13/2023     Lab Results   Component Value Date    IRON 106 07/08/2022    TIBC 361 07/08/2022    FERRITIN 212.00 07/08/2022     Lab Results   Component Value Date    INR 0.99 07/08/2022    PROTIME 12.9 07/08/2022        Interpretation Summary    · Left ventricular wall thickness is consistent with borderline concentric hypertrophy.  · Left ventricular diastolic dysfunction (grade I) consistent with impaired relaxation.  · Left ventricular systolic function is normal.  · Estimated EF appears to be in the  range of 56 - 60%.         Assessment/Plan   CAD status post CABG-history of stent placement to circumflex proximal artery in 2013.  Stress test was abnormal and intermediate risk.  Chest pain has resolved though.  Wanted to hold off on cardiac cath for now.  Would recommend cardiac cath of recurrent chest pain.  Added Ranexa, continue aspirin/statin and metoprolol XL.  Added PPI  As needed nitroglycerin.  Patient was advised to seek medical attention if significant chest pain unresolving.     Hypertension continue the Toprol in the evening and Hyzaar in the morning.     Hyperlipidemia on Lipitor 80 mg.  LDL was 42.         Prevention:  Patient's Body mass index is 32.5 kg/m². BMI is above normal parameters. Recommendations include: exercise counseling and nutrition counseling.      Robinson Quiroz is not a smoker    AAA Screening:      IMPRESSION: 05/2017  CONCLUSION:  No abdominal aortic aneurysm.          This document has been electronically signed by Manuel Alcaraz MD on March 30, 2023 08:27 CDT

## 2023-04-05 RX ORDER — RANOLAZINE 500 MG/1
500 TABLET, EXTENDED RELEASE ORAL 2 TIMES DAILY
Qty: 60 TABLET | Refills: 6 | Status: SHIPPED | OUTPATIENT
Start: 2023-04-05

## 2023-04-27 DIAGNOSIS — I10 ESSENTIAL HYPERTENSION: ICD-10-CM

## 2023-04-27 RX ORDER — LOSARTAN POTASSIUM AND HYDROCHLOROTHIAZIDE 12.5; 5 MG/1; MG/1
TABLET ORAL
Qty: 90 TABLET | Refills: 1 | Status: SHIPPED | OUTPATIENT
Start: 2023-04-27

## 2023-04-27 RX ORDER — TAMSULOSIN HYDROCHLORIDE 0.4 MG/1
CAPSULE ORAL
Qty: 180 CAPSULE | Refills: 1 | Status: SHIPPED | OUTPATIENT
Start: 2023-04-27

## 2023-05-30 ENCOUNTER — OFFICE VISIT (OUTPATIENT)
Dept: SLEEP MEDICINE | Facility: HOSPITAL | Age: 73
End: 2023-05-30

## 2023-05-30 VITALS
WEIGHT: 264.3 LBS | DIASTOLIC BLOOD PRESSURE: 76 MMHG | HEART RATE: 64 BPM | BODY MASS INDEX: 32.86 KG/M2 | HEIGHT: 75 IN | OXYGEN SATURATION: 95 % | SYSTOLIC BLOOD PRESSURE: 142 MMHG

## 2023-05-30 DIAGNOSIS — G47.33 OSA (OBSTRUCTIVE SLEEP APNEA): Primary | ICD-10-CM

## 2023-05-30 NOTE — PROGRESS NOTES
Sleep Clinic Follow Up    Date: 2023  Primary Care Provider: Merrick Gorman MD    Last office visit: 2022 (I reviewed this note)    CC: Follow up: JAVIER on CPAP, annual       Interim History:  Since the last visit:    1) moderate JAVIER -  Robinson Quiroz has remained compliant with CPAP. He denies mask and machine issues, dry mouth, headaches, or pressures intolerance. He denies abnormal dreams, sleep paralysis, nasal congestion, URI sx.    Doing well. States humidifier not using as much water. Current setting is 3. Appears it has been set at 4-5 In the past.     Sleep Testin. PSG on 04/10/2013, AHI of 18   2. CPAP titration on same day, recommended 10 cm H2O   3. Currently on 9 cm H2O    PAP Data:    Time frame: 2022-2023   Compliance: 99.4 %  Average use on days used: 8hrs 1 min  Percent of days with usage greater than or equal to 4 hours: 99.2%  PAP range: 9 cm H2O  Average 90% pressure: 9 cmH2O  Leak: 0 minutes  Average AHI: 0.9 events/hr  Machine: [employer] 2 with modem   Mask type: Nasal pillows  DME: [facility]     Bed time: 6977-6464  Sleep latency: 5 minutes  Number of times awakens during the night: 0-1  Wake time: 4166-8094  Estimated total sleep time at night: 6-8 hours  Caffeine intake: 2 cups of coffee, 0 cups of tea, and 0-1 sodas per day  Alcohol intake: 0 drinks per week  Nap time: occasionally    Sleepiness with Driving: denies        [place] Sleepiness Scale Score: 10    How likely are you to doze off or fall asleep in the following situation, in contrast to feeling just tired?     Use the following scale to choose the most appropriate number for each situation:    0 = would never doze  1 = slight chance of dozing   2 = moderate chance of dozing   3 = high chance of dozing    It is important that you answer each question as best you can.      Situation       Chance of Dozing (0-3)    Sitting and reading            ___2____    Watching TV           ___2____    Sitting, inactive in a public place (e.g. a theatre or meeting)   ___2____    As a passenger in a car for an hour without break    ___1____    Lying down to rest in the afternoon, when circumstances permit  ___2____    Sitting and talking to someone      ___0____    Sitting quietly after a lunch without alcohol     ___1____    In a car, while stopped for a few minutes in traffic    ___0____         PMHx, FH, SH reviewed and pertinent changes are: reports hurt back laying charlene       REVIEW OF SYSTEMS:   Negative for chest pain, SOA, fever, chills, cough, N/V/D, abdominal pain.    Smoking:none, former     Robinson Quiroz  reports that he quit smoking about 19 years ago. His smoking use included cigarettes. He has never used smokeless tobacco.. I have educated him on the risk of diseases from using tobacco products such as cancer, COPD and heart disease.          Exam:  Vitals:    05/30/23 0947   BP: 142/76   Pulse: 64   SpO2: 95%           05/30/23  0947   Weight: 120 kg (264 lb 4.8 oz)     Body mass index is 33.04 kg/m². BMI is >= 30 and <35. (Class 1 Obesity). The following options were offered after discussion;: nutrition counseling/recommendations      Gen:                No distress, conversant, pleasant, appears stated age, alert, oriented  Eyes:               Anicteric sclera, moist conjunctiva, no lid lag                           EOMI   Lungs:             normal effort, non-labored breathing                          Clear to auscultation bilaterally          CV:                  Normal S1/S2, no murmur                          no lower extremity edema                 Psych:             Appropriate affect  Neuro:             CN 2-12 appear intact    Past Medical History:   Diagnosis Date   • Arthritis    • Coronary artery disease    • History of echocardiogram 09/13/2012    NRL LV systolic function w/ EF 60%. Mild CLVH. NRL diastolic function of LV myocardium. Left atrial enlargement    • HL (hearing loss)    • Hyperlipidemia    • Hypertension    • Kidney stone    • Obesity    • Pneumonia    • Stroke        Current Outpatient Medications:   •  aspirin 81 MG tablet, Take 1 tablet by mouth Daily., Disp: 30 tablet, Rfl: 11  •  atorvastatin (LIPITOR) 80 MG tablet, Take 1 tablet by mouth Daily., Disp: 90 tablet, Rfl: 1  •  b complex-C-folic acid 1 MG capsule, Take 1 capsule by mouth Daily., Disp: , Rfl:   •  losartan-hydrochlorothiazide (HYZAAR) 50-12.5 MG per tablet, TAKE 1 TABLET BY MOUTH EVERY DAY, Disp: 90 tablet, Rfl: 1  •  metoprolol succinate XL (TOPROL-XL) 50 MG 24 hr tablet, TAKE 1 TABLET BY MOUTH EVERY DAY, Disp: 90 tablet, Rfl: 1  •  Multiple Vitamins-Minerals (MULTIVITAMIN ADULTS 50+ PO), Take 1 tablet by mouth., Disp: , Rfl:   •  nitroglycerin (NITROSTAT) 0.3 MG SL tablet, 1 under the tongue as needed for angina, may repeat q5mins for up three doses, Disp: 100 tablet, Rfl: 11  •  Omega-3 Fatty Acids (FISH OIL) 1000 MG capsule capsule, Take 1,200 mg by mouth Daily With Breakfast., Disp: , Rfl:   •  pantoprazole (PROTONIX) 40 MG EC tablet, TAKE 1 TABLET BY MOUTH DAILY., Disp: 90 tablet, Rfl: 1  •  pregabalin (Lyrica) 75 MG capsule, Take 1 capsule by mouth 2 (Two) Times a Day., Disp: 180 capsule, Rfl: 1  •  ranolazine (RANEXA) 500 MG 12 hr tablet, Take 1 tablet by mouth 2 (Two) Times a Day., Disp: 60 tablet, Rfl: 6  •  tamsulosin (FLOMAX) 0.4 MG capsule 24 hr capsule, TAKE 2 CAPSULES BY MOUTH EVERY DAY, Disp: 180 capsule, Rfl: 1  WBC   Date Value Ref Range Status   03/13/2023 7.07 3.40 - 10.80 10*3/mm3 Final     RBC   Date Value Ref Range Status   03/13/2023 4.73 4.14 - 5.80 10*6/mm3 Final     Hemoglobin   Date Value Ref Range Status   03/13/2023 14.9 13.0 - 17.7 g/dL Final     Hematocrit   Date Value Ref Range Status   03/13/2023 41.8 37.5 - 51.0 % Final     MCV   Date Value Ref Range Status   03/13/2023 88.4 79.0 - 97.0 fL Final     MCH   Date Value Ref Range Status   03/13/2023 31.5 26.6  - 33.0 pg Final     MCHC   Date Value Ref Range Status   03/13/2023 35.6 31.5 - 35.7 g/dL Final     RDW   Date Value Ref Range Status   03/13/2023 13.0 12.3 - 15.4 % Final     RDW-SD   Date Value Ref Range Status   03/13/2023 42.0 37.0 - 54.0 fl Final     MPV   Date Value Ref Range Status   03/13/2023 10.5 6.0 - 12.0 fL Final     Platelets   Date Value Ref Range Status   03/13/2023 171 140 - 450 10*3/mm3 Final     Neutrophil %   Date Value Ref Range Status   03/13/2023 58.1 42.7 - 76.0 % Final     Lymphocyte %   Date Value Ref Range Status   03/13/2023 26.6 19.6 - 45.3 % Final     Monocyte %   Date Value Ref Range Status   03/13/2023 10.3 5.0 - 12.0 % Final     Eosinophil %   Date Value Ref Range Status   03/13/2023 3.5 0.3 - 6.2 % Final     Basophil %   Date Value Ref Range Status   03/13/2023 0.7 0.0 - 1.5 % Final     Immature Grans %   Date Value Ref Range Status   03/13/2023 0.8 (H) 0.0 - 0.5 % Final     Neutrophils, Absolute   Date Value Ref Range Status   03/13/2023 4.10 1.70 - 7.00 10*3/mm3 Final     Lymphocytes, Absolute   Date Value Ref Range Status   03/13/2023 1.88 0.70 - 3.10 10*3/mm3 Final     Monocytes, Absolute   Date Value Ref Range Status   03/13/2023 0.73 0.10 - 0.90 10*3/mm3 Final     Eosinophils, Absolute   Date Value Ref Range Status   03/13/2023 0.25 0.00 - 0.40 10*3/mm3 Final     Basophils, Absolute   Date Value Ref Range Status   03/13/2023 0.05 0.00 - 0.20 10*3/mm3 Final     Immature Grans, Absolute   Date Value Ref Range Status   03/13/2023 0.06 (H) 0.00 - 0.05 10*3/mm3 Final     nRBC   Date Value Ref Range Status   03/13/2023 0.0 0.0 - 0.2 /100 WBC Final       Lab Results   Component Value Date    GLUCOSE 96 03/13/2023    BUN 10 03/13/2023    CREATININE 0.85 03/13/2023    EGFR 92.3 03/13/2023    BCR 11.8 03/13/2023    K 3.8 03/13/2023    CO2 27.6 03/13/2023    CALCIUM 9.9 03/13/2023    ALBUMIN 4.5 03/13/2023    BILITOT 0.8 03/13/2023    AST 32 03/13/2023    ALT 34 03/13/2023          Assessment and Plan:    1. Obstructive sleep apnea- Established, stable   1. Compliant with PAP therapy- compliance report reviewed with patient   2. Continue PAP as prescribed  3. Script for PAP supplies  4. Increase humidifier to 5   5. Pertinent labs reviewed   6. Drowsy driving tips- do not drive if feeling sleepy   7. Return to clinic in 12 months with compliance report unless change in symptoms in interim period            I spent 15 minutes caring for Robinson on this date of service. This time includes time spent by me in the following activities: preparing for the visit, obtaining and/or reviewing a separately obtained history, performing a medically appropriate examination and/or evaluation, counseling and educating the patient/family/caregiver, ordering medications, tests, or procedures and documenting information in the medical record. Educated on PAP management, maintenance, and compliance.           This document has been electronically signed by KWESI Canada on May 30, 2023 09:51 CDT            CC: Merrick Gorman MD          No ref. provider found

## 2023-08-04 RX ORDER — ATORVASTATIN CALCIUM 80 MG/1
80 TABLET, FILM COATED ORAL DAILY
Qty: 90 TABLET | Refills: 1 | Status: SHIPPED | OUTPATIENT
Start: 2023-08-04

## 2023-08-11 ENCOUNTER — OFFICE VISIT (OUTPATIENT)
Dept: CARDIOLOGY | Facility: CLINIC | Age: 73
End: 2023-08-11
Payer: MEDICARE

## 2023-08-11 VITALS
OXYGEN SATURATION: 97 % | HEIGHT: 75 IN | DIASTOLIC BLOOD PRESSURE: 66 MMHG | BODY MASS INDEX: 33.71 KG/M2 | SYSTOLIC BLOOD PRESSURE: 140 MMHG | HEART RATE: 71 BPM | WEIGHT: 271.1 LBS

## 2023-08-11 DIAGNOSIS — I10 HYPERTENSION, ESSENTIAL: Primary | ICD-10-CM

## 2023-08-11 DIAGNOSIS — I25.10 CORONARY ARTERY DISEASE DUE TO LIPID RICH PLAQUE: ICD-10-CM

## 2023-08-11 DIAGNOSIS — I25.83 CORONARY ARTERY DISEASE DUE TO LIPID RICH PLAQUE: ICD-10-CM

## 2023-08-11 PROCEDURE — 1160F RVW MEDS BY RX/DR IN RCRD: CPT | Performed by: INTERNAL MEDICINE

## 2023-08-11 PROCEDURE — 99214 OFFICE O/P EST MOD 30 MIN: CPT | Performed by: INTERNAL MEDICINE

## 2023-08-11 PROCEDURE — 3078F DIAST BP <80 MM HG: CPT | Performed by: INTERNAL MEDICINE

## 2023-08-11 PROCEDURE — 3077F SYST BP >= 140 MM HG: CPT | Performed by: INTERNAL MEDICINE

## 2023-08-11 PROCEDURE — 1159F MED LIST DOCD IN RCRD: CPT | Performed by: INTERNAL MEDICINE

## 2023-08-11 NOTE — PROGRESS NOTES
[facility]  OFFICE NOTE    Cardiovascular Medicine  Manuel Alcaraz M.D., RPVI         No referring provider defined for this encounter.    Thank you for asking me to see Robinson Quiroz for CAD.    This is a 73 y.o. male with:    1. Coronary artery disease involving native coronary artery of native heart without angina pectoris    2. Pure hypercholesterolemia    3. Essential hypertension          Chief complaint -Follow-up CAD        History of present Illness-73 y.o. year old gentleman with history of coronary disease status post quadruple bypass in 2004,   has coronary angiogram from 2012 which showed a patent LIMA to LAD, SVG to diagonal and is filling the LAD as well, SVG to circumflex looks to be occluded SVG to RPDA was patent.  He had significant disease in his native circumflex so underwent PCI of the mid circumflex and into and OM. had a abdominal aortic aneurysm screening last year and was okay.  He had a nuclear stress test in January 2017 which showed no ischemia EF was normal.  is on optimal medical therapy with beta blockers and angiotensin receptor blockers and his blood pressure is well controlled.  His BMI is high talked to him about diet and exercise.  Denies any GI symptoms will CNS symptoms.   He had one episode of chest pain several weeks ago, which is sharp in character lasted for 1 minute, was nonexertional and resolved on its own.  Is not recurred.  He is able to exercise on a treadmill for 30 minutes without any chest pain or shortness of breath.    08/11/2023:  Has done well since last visit.  Denies any further episodes of chest pain.  Pretty active at baseline without limitations from chest pain or shortness of breath.  Did twist his ankle with some swelling and pain on it.  Will see primary care physician for it.      03/30/2023:  Patient was complaining of chest pain since last visit, underwent a nuclear stress test which showed a medium size inferolateral  wall with mel-infarct ischemia and ischemia in the apex.  Echocardiogram for preserved LV systolic function.  His symptoms resolved after adding Ranexa and he wanted to hold off on cardiac cath.  Did not have any further episodes of chest pain.      12/30/2022:  Patient has been doing well until last month, had an episode of chest pain when he was swallowing a puzzle, it felt like a stabbing character was localized to substernal region radiated to his neck and right earache, lasted for a few minutes.  He had recurrence of pain in the middle of the night which woke him up and also lasted for a few minutes and felt like an ache.  Has not been having any exertional chest pain though.  He was attributing his symptoms to reflux and did not seek medical attention.    10/16/2020:  No acute issues since last visit but denying any chest pain or shortness of breath.        Review of Systems - ROS  Constitution: Negative for weakness, weight gain and weight loss.   HENT: Negative for congestion.    Eyes: Negative for blurred vision.   Cardiovascular: As mentioned above  Respiratory: Negative for cough and hemoptysis.    Endocrine: Negative for polydipsia and polyuria.   Hematologic/Lymphatic: Negative for bleeding problem. Does not bruise/bleed easily.   Skin: Negative for flushing.   Musculoskeletal: Negative for neck pain and stiffness.   Gastrointestinal: Negative for abdominal pain, diarrhea, jaundice, melena, nausea and vomiting.   Genitourinary: Negative for dysuria and hematuria.   Neurological: Negative for dizziness, focal weakness and numbness.   Psychiatric/Behavioral: Negative for altered mental status and depression.     I reviewed the ROS as documented here and confirmed the accuracy of it with the patient today. 8/11/2023        All other systems were reviewed and were negative.    family history includes Alcohol abuse in his paternal uncle; Alzheimer's disease in his father; Arthritis in his maternal aunt,  maternal grandfather, maternal grandmother, and maternal uncle; Cancer in his mother; Diabetes in his mother; Early death in his mother; Hearing loss in his daughter and mother; Heart disease in his father; Hyperlipidemia in his mother; Liver disease in his mother; Pancreatic cancer in his mother; Stroke in his father.     reports that he quit smoking about 19 years ago. His smoking use included cigarettes. He started smoking about 62 years ago. He has a 42.00 pack-year smoking history. He has been exposed to tobacco smoke. He has never used smokeless tobacco. He reports that he does not drink alcohol and does not use drugs.    Allergies   Allergen Reactions    Penicillins Anaphylaxis    Adhesive Tape Other (See Comments)     Takes his skin off    Latex Rash         Current Outpatient Medications:     aspirin 81 MG tablet, Take 1 tablet by mouth Daily., Disp: 30 tablet, Rfl: 11    atorvastatin (LIPITOR) 80 MG tablet, Take 1 tablet by mouth Daily., Disp: 90 tablet, Rfl: 1    b complex-C-folic acid 1 MG capsule, Take 1 capsule by mouth Daily., Disp: , Rfl:     losartan-hydrochlorothiazide (HYZAAR) 50-12.5 MG per tablet, TAKE 1 TABLET BY MOUTH EVERY DAY, Disp: 90 tablet, Rfl: 1    metoprolol succinate XL (TOPROL-XL) 50 MG 24 hr tablet, TAKE 1 TABLET BY MOUTH EVERY DAY, Disp: 90 tablet, Rfl: 1    Multiple Vitamins-Minerals (MULTIVITAMIN ADULTS 50+ PO), Take 1 tablet by mouth., Disp: , Rfl:     nitroglycerin (NITROSTAT) 0.3 MG SL tablet, 1 under the tongue as needed for angina, may repeat q5mins for up three doses, Disp: 100 tablet, Rfl: 11    Omega-3 Fatty Acids (FISH OIL) 1000 MG capsule capsule, Take 1,200 mg by mouth Daily With Breakfast., Disp: , Rfl:     pantoprazole (PROTONIX) 40 MG EC tablet, TAKE 1 TABLET BY MOUTH DAILY., Disp: 90 tablet, Rfl: 1    pregabalin (Lyrica) 75 MG capsule, Take 1 capsule by mouth 2 (Two) Times a Day., Disp: 180 capsule, Rfl: 1    ranolazine (RANEXA) 500 MG 12 hr tablet, Take 1 tablet  "by mouth 2 (Two) Times a Day., Disp: 60 tablet, Rfl: 6    tamsulosin (FLOMAX) 0.4 MG capsule 24 hr capsule, TAKE 2 CAPSULES BY MOUTH EVERY DAY, Disp: 180 capsule, Rfl: 1    Physical Exam:  Vitals:    08/11/23 0938   BP: 140/66   BP Location: Left arm   Patient Position: Sitting   Cuff Size: Adult   Pulse: 71   SpO2: 97%   Weight: 123 kg (271 lb 1.6 oz)   Height: 190.5 cm (75\")   PainSc: 0-No pain   PainLoc: Chest     Current Pain Level: none  Pulse Ox: Normal  on room air  General: alert, appears stated age and cooperative     Body Habitus: well-nourished    HEENT: Head: Normocephalic, no lesions, without obvious abnormality. No arcus senilis, xanthelasma or xanthomas.    Neuro: alert, oriented x3  Pulses: 2+ and symmetric  JVP: Volume/Pulsation: Normal.  Normal waveforms.   Appropriate inspiratory decrease.  No Kussmaul's. No Geno's.   Carotid Exam: no bruit normal pulsation bilaterally   Carotid Volume: normal.     Respirations: no increased work of breathing   Chest:  Normal    Pulmonary:Normal   Precordium: Normal impulses. P2 is not palpable.  RV Heave: absent  LV Heave: absent  [place]:  normal size and placement  Palpable S4: absent.  Heart rate: normal    Heart Rhythm: regular     Heart Sounds: S1: normal  S2: normal  S3: absent   S4: absent  Opening Snap: absent    Pericardial Rub:  Absent: .    Abdomen:   Appearance: normal .  Palpation: Soft, non-tender to palpation, bowel sounds positive in all four quadrants; no guarding or rebound tenderness  Extremity: no edema.   LE Skin: no rashes  LE Hair:  normal  LE Pulses: well perfused with normal pulses in the distal extremities  Pallor on elevation: Absent. Rubor on dependency: None    I have reexamined the patient and the results are consistent with the previously documented exam. Manuel Alcaraz MD       DATA REVIEWED:     EKG. I personally reviewed and interpreted the EKG.  Sinus rhythm with first-degree AV block.     ECG/EMG Results (all)       None      "     ---------------------------------------------------  TTE/ASHLYN:  Results for orders placed in visit on 12/30/22    Adult Transthoracic Echo Complete W/ Cont if Necessary Per Protocol    Interpretation Summary    Left ventricular systolic function is normal. Left ventricular ejection fraction appears to be 56 - 60%.    Left ventricular diastolic function is consistent with (grade I) impaired relaxation.    Estimated right ventricular systolic pressure from tricuspid regurgitation is normal (<35 mmHg).      --------------------------------------------------------------------------------------------------  LABS:     The CVD Risk score (SANJAY'Agostino, et al., 2008) failed to calculate for the following reasons:    The patient has a prior MI, stroke, CHF, or peripheral vascular disease diagnosis         Lab Results   Component Value Date    GLUCOSE 90 06/20/2023    BUN 8 06/20/2023    CREATININE 0.74 (L) 06/20/2023    EGFRIFNONA 94 05/27/2021    BCR 10.8 06/20/2023    K 3.9 06/20/2023    CO2 24.0 06/20/2023    CALCIUM 9.6 06/20/2023    ALBUMIN 4.4 06/20/2023    AST 28 06/20/2023    ALT 34 06/20/2023     Lab Results   Component Value Date    WBC 7.19 06/20/2023    HGB 15.1 06/20/2023    HCT 43.0 06/20/2023    MCV 88.8 06/20/2023     06/20/2023     Lab Results   Component Value Date    CHOL 127 06/20/2023    CHLPL 123 01/29/2016    TRIG 161 (H) 06/20/2023    HDL 50 06/20/2023    LDL 50 06/20/2023     Lab Results   Component Value Date    TSH 2.890 08/02/2018     Lab Results   Component Value Date    CKTOTAL 190 (H) 08/02/2018     Lab Results   Component Value Date    HGBA1C 5.50 03/13/2023     No results found for: DDIMER  Lab Results   Component Value Date    ALT 34 06/20/2023     Lab Results   Component Value Date    HGBA1C 5.50 03/13/2023    HGBA1C 5.6 01/29/2016     Lab Results   Component Value Date    CREATININE 0.74 (L) 06/20/2023     Lab Results   Component Value Date    IRON 106 07/08/2022      07/08/2022    FERRITIN 212.00 07/08/2022     Lab Results   Component Value Date    INR 0.99 07/08/2022    PROTIME 12.9 07/08/2022        Interpretation Summary    Left ventricular wall thickness is consistent with borderline concentric hypertrophy.  Left ventricular diastolic dysfunction (grade I) consistent with impaired relaxation.  Left ventricular systolic function is normal.  Estimated EF appears to be in the range of 56 - 60%.         Assessment/Plan   CAD status post CABG-history of stent placement to circumflex proximal artery in 2013.  Stress test was abnormal and intermediate risk.  Chest pain has resolved though.  Wanted to hold off on cardiac cath for now.  Would recommend cardiac cath of recurrent chest pain.  Added Ranexa, continue aspirin/statin and metoprolol XL.  Added PPI  As needed nitroglycerin.  Patient was advised to seek medical attention if significant chest pain unresolving.     Hypertension continue the Toprol in the evening and Hyzaar in the morning.     Hyperlipidemia on Lipitor 80 mg.  LDL was 50 in 06/2023.         Prevention:  Patient's Body mass index is 33.89 kg/mý. BMI is above normal parameters. Recommendations include: exercise counseling and nutrition counseling.      Robinson Quiroz is not a smoker    AAA Screening:      IMPRESSION: 05/2017  CONCLUSION:  No abdominal aortic aneurysm.          This document has been electronically signed by Manuel Alcaraz MD on August 11, 2023 09:43 CDT

## 2023-08-14 ENCOUNTER — OFFICE VISIT (OUTPATIENT)
Dept: FAMILY MEDICINE CLINIC | Facility: CLINIC | Age: 73
End: 2023-08-14
Payer: MEDICARE

## 2023-08-14 VITALS
DIASTOLIC BLOOD PRESSURE: 78 MMHG | HEIGHT: 75 IN | BODY MASS INDEX: 33.58 KG/M2 | SYSTOLIC BLOOD PRESSURE: 148 MMHG | HEART RATE: 68 BPM | WEIGHT: 270.06 LBS | OXYGEN SATURATION: 97 %

## 2023-08-14 DIAGNOSIS — I10 ESSENTIAL HYPERTENSION: ICD-10-CM

## 2023-08-14 DIAGNOSIS — S99.912A INJURY OF LEFT ANKLE, INITIAL ENCOUNTER: ICD-10-CM

## 2023-08-14 DIAGNOSIS — R45.4 IRRITABILITY AND ANGER: Primary | ICD-10-CM

## 2023-08-14 DIAGNOSIS — M79.2 NEUROPATHIC PAIN: ICD-10-CM

## 2023-08-14 PROCEDURE — 99214 OFFICE O/P EST MOD 30 MIN: CPT | Performed by: FAMILY MEDICINE

## 2023-08-14 PROCEDURE — 3078F DIAST BP <80 MM HG: CPT | Performed by: FAMILY MEDICINE

## 2023-08-14 PROCEDURE — 1160F RVW MEDS BY RX/DR IN RCRD: CPT | Performed by: FAMILY MEDICINE

## 2023-08-14 PROCEDURE — 3077F SYST BP >= 140 MM HG: CPT | Performed by: FAMILY MEDICINE

## 2023-08-14 PROCEDURE — 1159F MED LIST DOCD IN RCRD: CPT | Performed by: FAMILY MEDICINE

## 2023-08-14 RX ORDER — PREGABALIN 75 MG/1
75 CAPSULE ORAL 2 TIMES DAILY
Qty: 180 CAPSULE | Refills: 1 | Status: SHIPPED | OUTPATIENT
Start: 2023-08-14

## 2023-08-14 RX ORDER — LOSARTAN POTASSIUM AND HYDROCHLOROTHIAZIDE 12.5; 5 MG/1; MG/1
1 TABLET ORAL DAILY
Qty: 90 TABLET | Refills: 1 | Status: CANCELLED | OUTPATIENT
Start: 2023-08-14

## 2023-08-14 NOTE — PROGRESS NOTES
Subjective   Robinson Quiroz is a 73 y.o. male.   Cc: follow up of chronic medical issues    Hypertension  This is a chronic problem. The current episode started more than 1 year ago. The problem has been gradually improving since onset. Pertinent negatives include no anxiety, blurred vision, chest pain, headaches, malaise/fatigue, neck pain, orthopnea, palpitations, peripheral edema, PND, shortness of breath or sweats.   Peripheral Neuropathy  This is a chronic problem. The current episode started more than 1 year ago. The problem occurs daily. Pertinent negatives include no abdominal pain, anorexia, arthralgias, change in bowel habit, chest pain, chills, congestion, coughing, diaphoresis, fatigue, fever, headaches, joint swelling, myalgias, nausea, neck pain, numbness, rash, sore throat, swollen glands, urinary symptoms, vertigo, visual change, vomiting or weakness.   He has been having issues of anger and difficulty controlling it. He wants to be referred to a Psychologist.  He also has noted that he has turned his ankle on the left. [facility] did and xray and it was normal. He has been putting ice on it for 3-4 times daily over the last few days.    The following portions of the patient's history were reviewed and updated as appropriate: allergies, current medications, past family history, past medical history, past social history, past surgical history, and problem list.    Review of Systems   Constitutional:  Negative for chills, diaphoresis, fatigue, fever and malaise/fatigue.   HENT:  Negative for congestion and sore throat.    Eyes:  Negative for blurred vision.   Respiratory:  Negative for cough and shortness of breath.    Cardiovascular:  Negative for chest pain, palpitations, orthopnea and PND.   Gastrointestinal:  Negative for abdominal pain, anorexia, change in bowel habit, nausea and vomiting.   Musculoskeletal:  Negative for arthralgias, joint swelling, myalgias and neck pain.  "  Skin:  Negative for rash.   Neurological:  Negative for vertigo, weakness, numbness and headaches.     Objective   Physical Exam  Vitals reviewed.   Constitutional:       Appearance: Normal appearance.   HENT:      Head: Normocephalic and atraumatic.      Right Ear: Tympanic membrane, ear canal and external ear normal.      Left Ear: Tympanic membrane, ear canal and external ear normal.      Nose: Nose normal.      Mouth/Throat:      Mouth: Mucous membranes are moist.      Pharynx: Oropharynx is clear.   Cardiovascular:      Rate and Rhythm: Normal rate and regular rhythm.      Heart sounds: Normal heart sounds. No murmur heard.    No friction rub. No gallop.   Pulmonary:      Effort: Pulmonary effort is normal. No respiratory distress.      Breath sounds: Normal breath sounds. No stridor. No wheezing, rhonchi or rales.   Chest:      Chest wall: No tenderness.   Abdominal:      General: Bowel sounds are normal. There is no distension.      Palpations: Abdomen is soft. There is no mass.      Tenderness: There is no abdominal tenderness. There is no guarding or rebound.      Hernia: No hernia is present.   Musculoskeletal:      Comments: The left ankle is mildly swollen. He is able to take it through full range of motion.   Skin:     General: Skin is warm and dry.   Neurological:      Mental Status: He is alert.       Visit Vitals  /78   Pulse 68   Ht 190.5 cm (75\")   Wt 122 kg (270 lb 1 oz)   SpO2 97%   BMI 33.76 kg/mý     Body mass index is 33.76 kg/mý.      Assessment/Plan   Diagnoses and all orders for this visit:    1. Irritability and anger (Primary)  -     Ambulatory Referral to Psychology    2. Essential hypertension    3. Neuropathic pain  -     pregabalin (Lyrica) 75 MG capsule; Take 1 capsule by mouth 2 (Two) Times a Day.  Dispense: 180 capsule; Refill: 1    4. Injury of left ankle, initial encounter    I reviewed the CBC,CMP,and Lipid Profile (06/23/23) with the patient.  Return to the clinic in 3 " month/s.  Will contact with results as needed.  The above medical issues are stable.Continue on current medications.  Continue with ice to the ankle.          This document has been electronically signed by Merrick Gorman MD on August 14, 2023 14:56 CDT

## 2023-08-19 DIAGNOSIS — M79.2 NEUROPATHIC PAIN: ICD-10-CM

## 2023-08-21 ENCOUNTER — TELEPHONE (OUTPATIENT)
Dept: CARDIOLOGY | Facility: CLINIC | Age: 73
End: 2023-08-21
Payer: MEDICARE

## 2023-08-21 RX ORDER — PREGABALIN 75 MG/1
CAPSULE ORAL
Qty: 180 CAPSULE | Refills: 1 | OUTPATIENT
Start: 2023-08-21

## 2023-08-21 NOTE — TELEPHONE ENCOUNTER
Agreed. Cotinue medication  s          Previous Messages       ----- Message -----  From: Cleo Hunt MA  Sent: 8/21/2023  11:37 AM CDT  To: Manuel Alcaraz MD    Patient received a print out from [facility] pharmacy.  They stated metoprolol and ranexa could cause a decrease in his blood pressure and heart rate.  The patient does not check his blood pressure at home.  He is not currently having any symptoms.  I told him as long as his blood pressure and heart rate were ok that you would most likely have him continue.  He wanted me to let you know.

## 2023-08-21 NOTE — TELEPHONE ENCOUNTER
UPCOMING APPTS  With Family Medicine (Merrick Gorman MD)  11/21/2023 at 9:00 AM  LAST OFFICE VISIT - THIS DEPT  8/14/2023 Merrick Gorman MD    Refill sent 08/14/2023 to [facility]

## 2023-08-22 DIAGNOSIS — M79.2 NEUROPATHIC PAIN: ICD-10-CM

## 2023-08-22 RX ORDER — PREGABALIN 75 MG/1
75 CAPSULE ORAL 2 TIMES DAILY
Qty: 180 CAPSULE | Refills: 1 | Status: SHIPPED | OUTPATIENT
Start: 2023-08-22

## 2023-08-22 RX ORDER — PREGABALIN 75 MG/1
CAPSULE ORAL
Qty: 180 CAPSULE | Refills: 1 | OUTPATIENT
Start: 2023-08-22

## 2023-08-22 NOTE — TELEPHONE ENCOUNTER
Last Rx 08/14/2023  #180,  1 refill    UPCOMING APPTS  With Family Medicine (Merrick Gorman MD)  11/21/2023 at 9:00 AM  LAST OFFICE VISIT - THIS DEPT  8/14/2023 Merrick Gorman MD

## 2023-09-11 RX ORDER — PANTOPRAZOLE SODIUM 40 MG/1
40 TABLET, DELAYED RELEASE ORAL DAILY
Qty: 90 TABLET | Refills: 1 | OUTPATIENT
Start: 2023-09-11 | End: 2023-09-13

## 2023-09-11 NOTE — TELEPHONE ENCOUNTER
Incoming Refill Request      Medication requested (name and dose):     pantoprazole (PROTONIX) 40 MG EC tablet       Pharmacy where request should be sent: [facility]    Additional details provided by patient: PT STATES THE PHARMACY HAS REQUESTED THIS FROM DR LUND AND HAS NOT HEARD BACK FROM THEM. HE WOULD LIKE DR CAMARILLO TO REFILL IF POSSIBLE    Best call back number: [phone]    Does the patient have less than a 3 day supply:  [x] Yes  [] No    Denilson Gann Rep  09/11/23, 16:43 CDT

## 2023-09-12 ENCOUNTER — TELEPHONE (OUTPATIENT)
Dept: CARDIOLOGY | Facility: CLINIC | Age: 73
End: 2023-09-12
Payer: MEDICARE

## 2023-09-12 RX ORDER — PANTOPRAZOLE SODIUM 40 MG/1
TABLET, DELAYED RELEASE ORAL
Qty: 90 TABLET | Refills: 1 | Status: SHIPPED | OUTPATIENT
Start: 2023-09-12

## 2023-09-12 NOTE — TELEPHONE ENCOUNTER
Manuel hernandez MD [place]ham, Donna L, MA  Phone Number: [phone]     Continue          Previous Messages       ----- Message -----  From: Cleo Hunt MA  Sent: 9/12/2023  11:44 AM CDT  To: Manuel Alcaraz MD  Subject: FW: Prescription refill                          The patient wants to know if he needs to continue taking the protonix  ----- Message -----  From: Madelaine Lock RegSched Rep  Sent: 9/11/2023   4:51 PM CDT  To: Cleo Hunt MA  Subject: Prescription refill                              pantoprazole (PROTONIX) 40 MG EC tablet    Pharmacy: [facility] in [place]    Prescription refill--- patient has questions if he needs to take this or not, he is going out of town and needs it filled before he goes if yes. Can you give him a call back. Thanks       patient advised to continue protonix per dr. Alcaraz.

## (undated) DEVICE — BITEBLOCK ENDO W/STRAP 60F A/ LF DISP

## (undated) DEVICE — SINGLE-USE BIOPSY FORCEPS: Brand: RADIAL JAW 4